# Patient Record
Sex: FEMALE | Race: BLACK OR AFRICAN AMERICAN | NOT HISPANIC OR LATINO | Employment: FULL TIME | URBAN - METROPOLITAN AREA
[De-identification: names, ages, dates, MRNs, and addresses within clinical notes are randomized per-mention and may not be internally consistent; named-entity substitution may affect disease eponyms.]

---

## 2017-11-15 ENCOUNTER — TRANSCRIBE ORDERS (OUTPATIENT)
Dept: LAB | Facility: CLINIC | Age: 42
End: 2017-11-15

## 2017-11-15 ENCOUNTER — APPOINTMENT (OUTPATIENT)
Dept: LAB | Facility: CLINIC | Age: 42
End: 2017-11-15

## 2017-11-15 DIAGNOSIS — Z02.1 PRE-EMPLOYMENT HEALTH SCREENING EXAMINATION: ICD-10-CM

## 2017-11-15 DIAGNOSIS — Z02.1 PRE-EMPLOYMENT HEALTH SCREENING EXAMINATION: Primary | ICD-10-CM

## 2017-11-15 LAB — RUBV IGG SERPL IA-ACNC: 25.2 IU/ML

## 2017-11-15 PROCEDURE — 86706 HEP B SURFACE ANTIBODY: CPT

## 2017-11-15 PROCEDURE — 86480 TB TEST CELL IMMUN MEASURE: CPT

## 2017-11-15 PROCEDURE — 86762 RUBELLA ANTIBODY: CPT

## 2017-11-15 PROCEDURE — 36415 COLL VENOUS BLD VENIPUNCTURE: CPT

## 2017-11-15 PROCEDURE — 86765 RUBEOLA ANTIBODY: CPT

## 2017-11-15 PROCEDURE — 86787 VARICELLA-ZOSTER ANTIBODY: CPT

## 2017-11-15 PROCEDURE — 86735 MUMPS ANTIBODY: CPT

## 2017-11-16 LAB
HBV SURFACE AB SER-ACNC: <3.1 MIU/ML
MEV IGG SER QL: ABNORMAL
MUV IGG SER QL: NORMAL
VZV IGG SER IA-ACNC: NORMAL

## 2017-11-20 LAB — QUANTIFERON-TB GOLD IN TUBE: NORMAL

## 2018-03-14 ENCOUNTER — HOSPITAL ENCOUNTER (OUTPATIENT)
Dept: MAMMOGRAPHY | Facility: CLINIC | Age: 43
Discharge: HOME | End: 2018-03-14
Payer: COMMERCIAL

## 2018-03-14 ENCOUNTER — OFFICE VISIT (OUTPATIENT)
Dept: OBGYN CLINIC | Facility: CLINIC | Age: 43
End: 2018-03-14
Payer: COMMERCIAL

## 2018-03-14 ENCOUNTER — APPOINTMENT (OUTPATIENT)
Dept: LAB | Facility: CLINIC | Age: 43
End: 2018-03-14
Payer: COMMERCIAL

## 2018-03-14 VITALS
WEIGHT: 182 LBS | HEIGHT: 66 IN | SYSTOLIC BLOOD PRESSURE: 124 MMHG | BODY MASS INDEX: 29.25 KG/M2 | DIASTOLIC BLOOD PRESSURE: 70 MMHG

## 2018-03-14 DIAGNOSIS — Z01.419 ENCOUNTER FOR GYNECOLOGICAL EXAMINATION WITHOUT ABNORMAL FINDING: Primary | ICD-10-CM

## 2018-03-14 DIAGNOSIS — Z11.3 SCREENING FOR STDS (SEXUALLY TRANSMITTED DISEASES): ICD-10-CM

## 2018-03-14 DIAGNOSIS — B37.3 CANDIDIASIS OF FEMALE GENITALIA: ICD-10-CM

## 2018-03-14 DIAGNOSIS — Z12.31 ENCOUNTER FOR SCREENING MAMMOGRAM FOR MALIGNANT NEOPLASM OF BREAST: ICD-10-CM

## 2018-03-14 DIAGNOSIS — Z11.51 SCREENING FOR HUMAN PAPILLOMAVIRUS (HPV): ICD-10-CM

## 2018-03-14 DIAGNOSIS — Z11.3 SCREENING EXAMINATION FOR STD (SEXUALLY TRANSMITTED DISEASE): ICD-10-CM

## 2018-03-14 PROCEDURE — G0143 SCR C/V CYTO,THINLAYER,RESCR: HCPCS | Performed by: NURSE PRACTITIONER

## 2018-03-14 PROCEDURE — 86803 HEPATITIS C AB TEST: CPT | Performed by: NURSE PRACTITIONER

## 2018-03-14 PROCEDURE — 77063 BREAST TOMOSYNTHESIS BI: CPT

## 2018-03-14 PROCEDURE — 87491 CHLMYD TRACH DNA AMP PROBE: CPT | Performed by: NURSE PRACTITIONER

## 2018-03-14 PROCEDURE — 87389 HIV-1 AG W/HIV-1&-2 AB AG IA: CPT | Performed by: NURSE PRACTITIONER

## 2018-03-14 PROCEDURE — 86592 SYPHILIS TEST NON-TREP QUAL: CPT | Performed by: NURSE PRACTITIONER

## 2018-03-14 PROCEDURE — 77067 SCR MAMMO BI INCL CAD: CPT

## 2018-03-14 PROCEDURE — 99386 PREV VISIT NEW AGE 40-64: CPT | Performed by: NURSE PRACTITIONER

## 2018-03-14 PROCEDURE — 36415 COLL VENOUS BLD VENIPUNCTURE: CPT | Performed by: NURSE PRACTITIONER

## 2018-03-14 PROCEDURE — 87350 HEPATITIS BE AG IA: CPT | Performed by: NURSE PRACTITIONER

## 2018-03-14 PROCEDURE — 87624 HPV HI-RISK TYP POOLED RSLT: CPT | Performed by: NURSE PRACTITIONER

## 2018-03-14 PROCEDURE — 87591 N.GONORRHOEAE DNA AMP PROB: CPT | Performed by: NURSE PRACTITIONER

## 2018-03-14 RX ORDER — FLUCONAZOLE 150 MG/1
150 TABLET ORAL ONCE
Qty: 3 TABLET | Refills: 1 | Status: SHIPPED | OUTPATIENT
Start: 2018-03-14 | End: 2018-03-14

## 2018-03-14 NOTE — PROGRESS NOTES
Assessment/Plan:    No problem-specific Assessment & Plan notes found for this encounter  Diagnoses and all orders for this visit:    Encounter for gynecological examination without abnormal finding    Encounter for screening mammogram for malignant neoplasm of breast  -     Mammo screening bilateral w cad; Future    Screening for human papillomavirus (HPV)  -     Liquid-based pap, screening    Screening examination for STD (sexually transmitted disease)  -     Hepatitis B e antigen  -     Hepatitis C antibody  -     HIV 1/2 AG-AB combo  -     RPR    Screening for STDs (sexually transmitted diseases)  -     Chlamydia/GC amplified DNA by PCR    Candidiasis of female genitalia  -     fluconazole (DIFLUCAN) 150 mg tablet; Take 1 tablet (150 mg total) by mouth once for 1 dose Repeat on day 3 and day 6 for total of 3 doses        Calcium/vit d supplementation, natural lubes list given, call with any bleeding or spotting  Subjective:      Patient ID: Avel Caballero is a 40 y o  female  Pleasant 40 y o  premenopausal female here for annual exam  She denies any issues with vaginal bleeding, hx of supracervical? Hysterectomy for fibroids 2016  Denies history of abnormal pap smears  +vaginal itching since using amoxicillin recently for URI  Denies pelvic pain but has pain with sex possibly d/t dryness, has not used lubes  Gynecologic Exam   Pertinent negatives include no chills, constipation, diarrhea, dysuria, fever or hematuria  The following portions of the patient's history were reviewed and updated as appropriate:   She  has no past medical history on file  She   Patient Active Problem List    Diagnosis Date Noted    Encounter for gynecological examination without abnormal finding 03/14/2018    Hereditary spherocytosis ([facility] [address] ) 04/07/2009     She  has no past surgical history on file  Her family history is not on file  She  reports that she has never smoked   She has never used smokeless tobacco  She reports that she drinks alcohol  She reports that she does not use drugs  Current Outpatient Prescriptions   Medication Sig Dispense Refill    fluconazole (DIFLUCAN) 150 mg tablet Take 1 tablet (150 mg total) by mouth once for 1 dose Repeat on day 3 and day 6 for total of 3 doses 3 tablet 1     No current facility-administered medications for this visit  She is allergic to latex and shellfish allergy     , son is 13 and daughter is 7  Works for [employer],  next door    Review of Systems   Constitutional: Negative for chills, fatigue, fever and unexpected weight change  Respiratory: Negative for shortness of breath  Gastrointestinal: Negative for anal bleeding, blood in stool, constipation and diarrhea  Genitourinary: Negative for difficulty urinating, dysuria and hematuria  Objective:      /70 (BP Location: Right arm, Patient Position: Sitting, Cuff Size: Standard)   Ht 5' 6" (1 676 m)   Wt 82 6 kg (182 lb)   BMI 29 38 kg/m²          Physical Exam   Constitutional: She appears well-developed and well-nourished  No distress  HENT:   Head: Normocephalic  Neck: Normal range of motion  Neck supple  Pulmonary/Chest: Effort normal  Right breast exhibits no inverted nipple, no mass, no nipple discharge, no skin change and no tenderness  Left breast exhibits no inverted nipple, no mass, no nipple discharge, no skin change and no tenderness  Breasts are symmetrical    Abdominal: Soft  Genitourinary: No breast swelling, tenderness, discharge or bleeding  Pelvic exam was performed with patient supine  No labial fusion  There is no rash, tenderness, lesion or injury on the right labia  There is no rash, tenderness, lesion or injury on the left labia  Right adnexum displays no mass, no tenderness and no fullness  Left adnexum displays no mass, no tenderness and no fullness  No erythema or tenderness in the vagina  No foreign body in the vagina   No signs of injury around the vagina  No vaginal discharge found  Genitourinary Comments: Cervix not seen, uterus absent, yeast NOTED on exam   Lymphadenopathy:        Right: No inguinal adenopathy present  Left: No inguinal adenopathy present

## 2018-03-14 NOTE — PATIENT INSTRUCTIONS
Menopause   WHAT YOU NEED TO KNOW:   What is menopause? Menopause is a normal stage in a woman's life when her monthly periods stop  Menopause starts when the ovaries slowly stop making the female hormones estrogen and progesterone  After menopause, a woman is no longer able to become pregnant  A woman who has not had a period for a full year after the age of 39 is considered to be in menopause  Perimenopause is a stage before menopause that may cause signs and symptoms similar to menopause  Perimenopause can last an average of 4 to 5 years  What are the signs and symptoms of menopause? The signs and symptoms of menopause can be different from woman to woman:  · Menstrual period changes such as skipped periods or periods that are closer together, or lighter or heavier than usual    · Hot flashes (feeling warm, flushed, and sweaty)     · Mood changes such as irritability or decreased desire to have sex    · Breast changes such as tenderness or pain    · Hair changes such as thinning hair or increased hair on your face    · Vaginal changes such as increased dryness     · Urinary changes such as increased urinary tract infections (UTIs) or urgency (feeling that you need to urinate right away)    · Other symptoms such as headaches, trouble sleeping, fatigue, or heart palpitations (strong, fast heartbeats)  What do I need to know about menopause? · You can still get pregnant while you have periods  Continue to use birth control if you do not want to get pregnant  You may need to use birth control until it has been 1 year since your periods stopped  Ask your healthcare provider when you can stop using birth control to prevent pregnancy  · Hormone replacement therapy can be used to treat symptoms of menopause  Hormone replacement therapy (HRT) is medicine that replaces your low hormone levels  HRT contains estrogen and sometimes progestin  HRT has benefits and risks   HRT decreases your risk for bone fractures by helping to prevent osteoporosis  HRT also protects you from colon cancer  HRT may increase your risk for breast cancer, blood clots, heart disease, and stroke  Ask your healthcare provider if HRT is right for you  How can I live a healthy lifestyle during and after menopause? After menopause, your risk for heart disease and bone loss increases  Ask about these and other ways to stay healthy:  · Exercise regularly  Exercise helps you maintain a healthy weight  Exercise can also help to control your blood pressure and cholesterol levels  Include weight-bearing exercise for strong bones  Ask your healthcare provider about the best exercise plan for you  · Eat a variety of healthy foods  Include fruits, vegetables, whole grains (whole-wheat bread, pasta, and cereals), low-fat dairy, and lean protein foods (beans, poultry, and fish)  Limit foods high in sodium (salt)  Ask your healthcare provider for more information about a meal plan that is right for you  · Maintain a healthy weight  Check with your healthcare provider before you start any weight loss program      · Take supplements as directed  You may need extra calcium and vitamin D to help prevent osteoporosis  · Limit alcohol and caffeine  Alcohol and caffeine may worsen your symptoms  · Do not smoke  If you smoke, it is never too late to quit  You are more likely to have a heart attack, lung disease, blood clots, and cancer if you smoke  Ask your healthcare provider for information if you need help quitting  When should I contact my healthcare provider? · You have vaginal bleeding after menopause  · You have questions or concerns about your condition or care  CARE AGREEMENT:   You have the right to help plan your care  Learn about your health condition and how it may be treated  Discuss treatment options with your caregivers to decide what care you want to receive  You always have the right to refuse treatment   The above information is an  only  It is not intended as medical advice for individual conditions or treatments  Talk to your doctor, nurse or pharmacist before following any medical regimen to see if it is safe and effective for you  © 2017 [facility] [place] [facility]– [place] Information is for End User's use only and may not be sold, redistributed or otherwise used for commercial purposes  All illustrations and images included in CareNotes® are the copyrighted property of A D A M , Inc  or Melvin Tomlinson

## 2018-03-15 LAB
CHLAMYDIA DNA CVX QL NAA+PROBE: NORMAL
HCV AB SER QL: NORMAL
N GONORRHOEA DNA GENITAL QL NAA+PROBE: NORMAL
RPR SER QL: NORMAL

## 2018-03-16 LAB
HBV E AG SERPL QL IA: NEGATIVE
HIV 1+2 AB+HIV1 P24 AG SERPL QL IA: NORMAL

## 2018-03-19 LAB — HPV RRNA GENITAL QL NAA+PROBE: NORMAL

## 2018-03-20 LAB
LAB AP GYN PRIMARY INTERPRETATION: NORMAL
Lab: NORMAL

## 2018-03-23 ENCOUNTER — OFFICE VISIT (OUTPATIENT)
Dept: DERMATOLOGY | Facility: CLINIC | Age: 43
End: 2018-03-23
Payer: COMMERCIAL

## 2018-03-23 DIAGNOSIS — L70.0 ACNE VULGARIS: Primary | ICD-10-CM

## 2018-03-23 PROCEDURE — 99203 OFFICE O/P NEW LOW 30 MIN: CPT | Performed by: DERMATOLOGY

## 2018-03-23 RX ORDER — SPIRONOLACTONE 50 MG/1
50 TABLET, FILM COATED ORAL DAILY
Qty: 30 TABLET | Refills: 2 | Status: SHIPPED | OUTPATIENT
Start: 2018-03-23 | End: 2018-06-20 | Stop reason: SDUPTHER

## 2018-03-23 RX ORDER — DIPHENOXYLATE HYDROCHLORIDE AND ATROPINE SULFATE 2.5; .025 MG/1; MG/1
1 TABLET ORAL DAILY
COMMUNITY

## 2018-03-23 NOTE — PATIENT INSTRUCTIONS
Acne we discussed the pathophysiology of acne the role of hormones cleansers diet and genetics  Will go ahead and start patient on topical therapy and re-evaluate in 3 months   will also will add spironolactone to her therapy    Patient advised to obtain Differin gel 0 1% over-the-counter along with  2 5% benzoyl peroxide to use daily

## 2018-03-23 NOTE — PROGRESS NOTES
[address] [facility] [address] [facility]  [address] [address]     MRN: [id number] : 1975  Encounter: [id number]  Patient Information: Mode Nicole  Chief complaint: facial acne    History of present illness:  43-year-old female presents because of concerns regarding acne this been a problem over the last couple years especially since she had a hysterectomy with oophorectomy  Patient has used numerous cleanses over the years without any improvement  Past Medical History:   Diagnosis Date    Fibroid      Past Surgical History:   Procedure Laterality Date    HYSTERECTOMY N/A      Social History   History   Alcohol Use    Yes     Comment: social     History   Drug Use No     History   Smoking Status    Never Smoker   Smokeless Tobacco    Never Used     No family history on file  Meds/Allergies   Allergies   Allergen Reactions    Latex      contact    Shellfish Allergy        Meds:  Prior to Admission medications    Medication Sig Start Date End Date Taking?  Authorizing Provider   multivitamin (THERAGRAN) TABS Take 1 tablet by mouth daily   Yes Historical Provider, MD       Subjective:     Review of Systems:    General: negative for - chills, fatigue, fever,  weight gain or weight loss  Psychological: negative for - anxiety, behavioral disorder, concentration difficulties, decreased libido, depression, irritability, memory difficulties, mood swings, sleep disturbances or suicidal ideation  ENT: negative for - hearing difficulties , nasal congestion, nasal discharge, oral lesions, sinus pain, sneezing, sore throat  Allergy and Immunology: negative for - hives, insect bite sensitivity,  Hematological and Lymphatic: negative for - bleeding problems, blood clots,bruising, swollen lymph nodes  Endocrine: negative for - hair pattern changes, hot flashes, malaise/lethargy, mood swings, palpitations, polydipsia/polyuria, skin changes, temperature intolerance or unexpected weight change  Respiratory: negative for - cough, hemoptysis, orthopnea, shortness of breath, or wheezing  Cardiovascular: negative for - chest pain, dyspnea on exertion, edema,  Gastrointestinal: negative for - abdominal pain, nausea/vomiting  Genito-Urinary: negative for - dysuria, incontinence, irregular/heavy menses or urinary frequency/urgency  Musculoskeletal: negative for - gait disturbance, joint pain, joint stiffness, joint swelling, muscle pain, muscular weakness  Dermatological:  As in HPI  Neurological: negative for confusion, dizziness, headaches, impaired coordination/balance, memory loss, numbness/tingling, seizures, speech problems, tremors or weakness       Objective: There were no vitals taken for this visit  Physical Exam:    General Appearance:    Alert, cooperative, no distress   Head:    Normocephalic, without obvious abnormality, atraumatic           Skin:   A full skin exam was performed including scalp, head scalp, eyes, ears, nose, lips, neck, chest, axilla, abdomen, back, buttocks, bilateral upper extremities, bilateral lower extremities, hands, feet, fingers, toes, fingernails, and toenails Occasional inflamed papule nodules noted on the forehead some comedones no scarring noted     Assessment:     1  Acne vulgaris  spironolactone (ALDACTONE) 50 mg tablet         Plan:   Acne we discussed the pathophysiology of acne the role of hormones cleansers diet and genetics  Will go ahead and start patient on topical therapy and re-evaluate in 3 months   will also will add spironolactone to her therapy    Patient advised to obtain Differin gel 0 1% over-the-counter along with  2 5% benzoyl peroxide to use daily    Rahul Brady MD  3/23/2018,12:37 PM    Portions of the record may have been created with voice recognition software   Occasional wrong word or "sound a like" substitutions may have occurred due to the inherent limitations of voice recognition software   Read the chart carefully and recognize, using context, where substitutions have occurred

## 2018-04-14 ENCOUNTER — OFFICE VISIT (OUTPATIENT)
Dept: URGENT CARE | Facility: CLINIC | Age: 43
End: 2018-04-14
Payer: COMMERCIAL

## 2018-04-14 VITALS
BODY MASS INDEX: 29.86 KG/M2 | OXYGEN SATURATION: 98 % | RESPIRATION RATE: 16 BRPM | DIASTOLIC BLOOD PRESSURE: 71 MMHG | HEART RATE: 100 BPM | HEIGHT: 66 IN | SYSTOLIC BLOOD PRESSURE: 108 MMHG | WEIGHT: 185.8 LBS | TEMPERATURE: 97.4 F

## 2018-04-14 DIAGNOSIS — J02.9 ACUTE SORE THROAT: ICD-10-CM

## 2018-04-14 DIAGNOSIS — R09.89 TONSIL PAIN: Primary | ICD-10-CM

## 2018-04-14 DIAGNOSIS — S16.1XXA STRAIN OF MUSCLE, FASCIA AND TENDON AT NECK LEVEL, INITIAL ENCOUNTER: ICD-10-CM

## 2018-04-14 LAB — S PYO AG THROAT QL: NEGATIVE

## 2018-04-14 PROCEDURE — S9088 SERVICES PROVIDED IN URGENT: HCPCS | Performed by: PHYSICIAN ASSISTANT

## 2018-04-14 PROCEDURE — 99213 OFFICE O/P EST LOW 20 MIN: CPT | Performed by: PHYSICIAN ASSISTANT

## 2018-04-14 PROCEDURE — 87070 CULTURE OTHR SPECIMN AEROBIC: CPT | Performed by: PHYSICIAN ASSISTANT

## 2018-04-14 RX ORDER — AZITHROMYCIN 250 MG/1
TABLET, FILM COATED ORAL
Qty: 6 TABLET | Refills: 0 | Status: SHIPPED | OUTPATIENT
Start: 2018-04-14 | End: 2018-04-18

## 2018-04-14 RX ORDER — IBUPROFEN 800 MG/1
800 TABLET ORAL EVERY 6 HOURS PRN
Qty: 30 TABLET | Refills: 0 | Status: SHIPPED | OUTPATIENT
Start: 2018-04-14 | End: 2018-11-20 | Stop reason: CLARIF

## 2018-04-14 RX ORDER — AMOXICILLIN 500 MG/1
500 TABLET, FILM COATED ORAL 2 TIMES DAILY
Qty: 20 TABLET | Refills: 0 | Status: SHIPPED | OUTPATIENT
Start: 2018-04-14 | End: 2018-04-14 | Stop reason: SINTOL

## 2018-04-14 RX ORDER — METAXALONE 800 MG/1
800 TABLET ORAL EVERY 6 HOURS PRN
Qty: 12 TABLET | Refills: 0 | Status: SHIPPED | OUTPATIENT
Start: 2018-04-14 | End: 2018-11-20 | Stop reason: SDUPTHER

## 2018-04-14 NOTE — LETTER
April 14, 2018     Patient: Mckenna Márquez   YOB: 1975   Date of Visit: 4/14/2018       To Whom it May Concern:    Mckenna Márquez is under my professional care  She was seen in my office on 4/14/2018  She  may return to work when she is fever and symptom free  If you have any questions or concerns, please don't hesitate to call           Sincerely,          BAYLEE Kelley        CC: No Recipients

## 2018-04-14 NOTE — PROGRESS NOTES
[facility]        NAME: Mya Williamson is a 40 y o  female  : 1975    MRN: [id number]  DATE: 2018  TIME: 11:33 AM    Assessment and Plan   Tonsil pain [R09 89]  1  Tonsil pain  POCT rapid strepA    Throat culture    azithromycin (ZITHROMAX) 250 mg tablet    DISCONTINUED: amoxicillin (AMOXIL) 500 MG tablet   2  Strain of muscle, fascia and tendon at neck level, initial encounter  ibuprofen (MOTRIN) 800 mg tablet    metaxalone (SKELAXIN) 800 mg tablet   3  Acute sore throat           Patient Instructions   Rapid strep test performed in office-negative  Throat culture sent to lab  Patient presents with sore throat and left-sided neck and shoulder pain    Will treat for possible left tonsillar abscess with azithromycin (patient states that she has not tolerated amoxicillin in the past) as well as musculoskeletal pain and spasm with ibuprofen 800 mg and Skelaxin  Heat to affected area, rest   Closely monitor for fever or chills  Neck exercises at home  I extensively educated the patient on the possibility of her developing a left tonsillar abscess  We will treat with prophylactic antibiotics  If she develops difficulty swallowing, difficulty swallowing secretions or voice change, fever or chills she should immediately proceed to the emergency room  Follow up with PCP in 3-5 days  Proceed to  ER if symptoms worsen  Chief Complaint     Chief Complaint   Patient presents with    Sore Throat     Started Thursday    Neck Pain     L sided, radiates down into L shoulder         History of Present Illness   The patient is a 62-year-old female who presents with sore throat and left neck and shoulder pain for 2 days  Positive sore throat, denies difficulty swallowing or drooling or voice change  Negative dental pain or recent oral surgery  She states that her sore throat is worse on the left side    She also feels as though the left side of her neck is painful positive left neck pain radiating to her shoulder  She denies any pain radiating down to her fingers  Negative numbness or tingling  Negative fever or chills  Negative abdominal pain, nausea, vomiting or diarrhea  She does work at [employer] and does a lot of computer work where she is staying down  She denies any neck trauma or injury, no prior history of neck or shoulder injury  HPI    Review of Systems   Review of Systems   Constitutional: Negative for activity change, appetite change, chills, fatigue and fever  HENT: Positive for sore throat  Negative for congestion, dental problem, drooling, ear discharge, ear pain, facial swelling, hearing loss, postnasal drip, rhinorrhea, sinus pain, sinus pressure, sneezing, tinnitus, trouble swallowing and voice change  Eyes: Negative  Respiratory: Negative  Negative for cough and shortness of breath  Cardiovascular: Negative  Negative for chest pain, palpitations and leg swelling  Gastrointestinal: Negative  Negative for abdominal distention, abdominal pain, diarrhea, nausea and vomiting  Genitourinary: Negative  Negative for difficulty urinating  Musculoskeletal: Positive for neck pain  Negative for gait problem, joint swelling and neck stiffness  Skin: Negative  Negative for rash and wound  Neurological: Negative for dizziness, syncope, speech difficulty, weakness, light-headedness, numbness and headaches  All other systems reviewed and are negative          Current Medications       Current Outpatient Prescriptions:     multivitamin (THERAGRAN) TABS, Take 1 tablet by mouth daily, Disp: , Rfl:     spironolactone (ALDACTONE) 50 mg tablet, Take 1 tablet (50 mg total) by mouth daily, Disp: 30 tablet, Rfl: 2    azithromycin (ZITHROMAX) 250 mg tablet, Take 2 tablets on day 1 and then 1 tablet daily x 4 days, Disp: 6 tablet, Rfl: 0    ibuprofen (MOTRIN) 800 mg tablet, Take 1 tablet (800 mg total) by mouth every 6 (six) hours as needed for mild pain, Disp: 30 tablet, Rfl: 0    metaxalone (SKELAXIN) 800 mg tablet, Take 1 tablet (800 mg total) by mouth every 6 (six) hours as needed for muscle spasms, Disp: 12 tablet, Rfl: 0    Current Allergies     Allergies as of 04/14/2018 - Reviewed 04/14/2018   Allergen Reaction Noted    Latex  11/17/2016    Shellfish allergy  04/07/2009            The following portions of the patient's history were reviewed and updated as appropriate: allergies, current medications, past family history, past medical history, past social history, past surgical history and problem list      Past Medical History:   Diagnosis Date    Fibroid        Past Surgical History:   Procedure Laterality Date    HYSTERECTOMY N/A        History reviewed  No pertinent family history  Medications have been verified  Objective   /71   Pulse 100   Temp (!) 97 4 °F (36 3 °C) (Tympanic)   Resp 16   Ht 5' 6" (1 676 m)   Wt 84 3 kg (185 lb 12 8 oz)   SpO2 98%   BMI 29 99 kg/m²        Physical Exam     Physical Exam   Constitutional: She is oriented to person, place, and time  She appears well-developed and well-nourished  No distress  HENT:   Head: Normocephalic and atraumatic  Right Ear: Hearing, tympanic membrane, external ear and ear canal normal  No drainage or tenderness  Tympanic membrane is not perforated, not erythematous, not retracted and not bulging  No middle ear effusion  No decreased hearing is noted  Left Ear: Hearing, tympanic membrane, external ear and ear canal normal  No drainage or tenderness  Tympanic membrane is not perforated, not erythematous, not retracted and not bulging  No middle ear effusion  No decreased hearing is noted  Nose: Nose normal  No mucosal edema, rhinorrhea or septal deviation  Right sinus exhibits no maxillary sinus tenderness and no frontal sinus tenderness  Left sinus exhibits no maxillary sinus tenderness and no frontal sinus tenderness     Mouth/Throat: Uvula is midline, oropharynx is clear and moist and mucous membranes are normal  No oral lesions  Normal dentition  No dental abscesses, uvula swelling or dental caries  No oropharyngeal exudate, posterior oropharyngeal edema, posterior oropharyngeal erythema or tonsillar abscesses  Eyes: Conjunctivae and EOM are normal  Pupils are equal, round, and reactive to light  Neck: Trachea normal and phonation normal  Neck supple  No JVD present  Muscular tenderness present  No tracheal tenderness and no spinous process tenderness present  No neck rigidity  No tracheal deviation, no edema, no erythema and normal range of motion present  No Brudzinski's sign and no Kernig's sign noted  No thyroid mass and no thyromegaly present  Cardiovascular: Normal rate, regular rhythm, S1 normal, S2 normal, normal heart sounds, intact distal pulses and normal pulses  No murmur heard  Pulmonary/Chest: Effort normal and breath sounds normal  No accessory muscle usage or stridor  No respiratory distress  She has no wheezes  Abdominal: Soft  Normal appearance and bowel sounds are normal  She exhibits no distension  There is no hepatosplenomegaly  There is no tenderness  Musculoskeletal: She exhibits no edema  Left shoulder: She exhibits decreased range of motion, tenderness, pain and spasm  She exhibits no bony tenderness, no swelling, no effusion, no crepitus, no deformity, no laceration, normal pulse and normal strength  Arms:       Left hand: She exhibits normal capillary refill  Normal sensation noted  Decreased sensation is not present in the ulnar distribution, is not present in the medial redistribution and is not present in the radial distribution  Normal strength noted  She exhibits no finger abduction, no thumb/finger opposition and no wrist extension trouble  Negative radiating pain to left fingers, negative paresthesias     Lymphadenopathy:        Head (right side): No submental, no submandibular, no tonsillar, no preauricular, no posterior auricular and no occipital adenopathy present  Head (left side): No submental, no submandibular, no tonsillar, no preauricular, no posterior auricular and no occipital adenopathy present  Right cervical: No superficial cervical, no deep cervical and no posterior cervical adenopathy present  Left cervical: No superficial cervical, no deep cervical and no posterior cervical adenopathy present  Neurological: She is alert and oriented to person, place, and time  She has normal strength  No cranial nerve deficit or sensory deficit  Coordination and gait normal    Skin: Skin is warm, dry and intact  No abrasion, no lesion and no rash noted  She is not diaphoretic  No cyanosis or erythema  Nails show no clubbing  Psychiatric: She has a normal mood and affect  Her speech is normal and behavior is normal    Nursing note and vitals reviewed

## 2018-04-14 NOTE — PATIENT INSTRUCTIONS
Rapid strep test performed in office-  Patient presents with sore throat and left-sided neck and shoulder pain    Will treat for possible left tonsillar abscess with amoxicillin as well as musculoskeletal pain and spasm with ibuprofen 800 mg and Skelaxin  Heat to affected area, rest   Closely monitor for fever or chills  Neck exercises at home  Follow up with PCP in 3-5 days  Proceed to  ER if symptoms worsen  Acute Neck Pain   AMBULATORY CARE:   Acute neck pain  starts suddenly, increases quickly, and goes away in a few days  The pain may come and go, or be worse with certain movements  The pain may be only in your neck, or it may move to your arms, back, or shoulders  You may also have pain that starts in another body area and moves to your neck  Seek care immediately if:   · You have an injury that causes neck pain and shooting pain down your arms or legs  · Your neck pain suddenly becomes severe  · You have neck pain along with numbness, tingling, or weakness in your arms or legs  · You have a stiff neck, a headache, and a fever  Contact your healthcare provider if:   · You have new or worsening symptoms  · Your symptoms continue even after treatment  · You have questions or concerns about your condition or care  Treatment  may include any of the following, depending on what is causing your pain:  · Medicines  may be prescribed or recommended by your healthcare provider for pain  You may need medicine to treat nerve pain or to stop muscle spasms  Medicines may also be given to reduce inflammation  Your healthcare provider may inject medicine into a nerve to block pain  Over-the-counter NSAID medicine or acetaminophen may be recommended to help treat minor pain or inflammation  · Traction  is used to relieve pressure from nerves  Your head is gently pulled up and away from your neck  This stretches muscles and ligaments and makes more room for the spine   Your healthcare provider will tell you the kind of traction that will help your neck pain  Do not use traction devices at home unless directed by your healthcare provider  Manage or prevent acute neck pain:   · Rest your neck as directed  Do not make sudden movements, such as turning your head quickly  Your healthcare provider may recommend you wear a cervical collar for a short time  The collar will prevent you from moving your head  This will give your neck time to heal if an injury is causing your neck pain  Ask your healthcare provider when you can return to sports or other normal daily activities  · Apply heat as directed  Heat helps relieve pain and swelling  Use a heat wrap, or soak a small towel in warm water  Wring out the extra water  Apply the heat wrap or towel for 20 minutes every hour, or as directed  · Apply ice as directed  Ice helps relieve pain and swelling, and can help prevent tissue damage  Use an ice pack, or put ice in a bag  Cover the ice pack or back with a towel before you apply it to your neck  Apply the ice pack or ice for 15 minutes every hour, or as directed  Your healthcare provider can tell you how often to apply ice  · Do neck exercises as directed  Neck exercises help strengthen the muscles and increase range of motion  Your healthcare provider will tell you which exercises are right for you  He may give you instructions, or he may recommend that you work with a physical therapist  Your healthcare provider or therapist can make sure you are doing the exercises correctly  · Maintain good posture  Try to keep your head and shoulders lifted when you sit  If you work in front of a computer, make sure the monitor is at the right level  You should not need to look up down to see the screen  You should also not have to lean forward to be able to read what is on the screen   Make sure your keyboard, mouse, and other computer items are placed where you do not have to extend your shoulder to reach them  Get up often if you work in front of a computer or sit for long periods of time  Stretch or walk around to keep your neck muscles loose  Follow up with your healthcare provider as directed: Your healthcare provider may refer you to a specialist if your pain does not get better with treatment  Write down your questions so you remember to ask them during your visits  © 2017 [employer] Information is for End User's use only and may not be sold, redistributed or otherwise used for commercial purposes  All illustrations and images included in CareNotes® are the copyrighted property of [employer]  or [employer]  The above information is an  only  It is not intended as medical advice for individual conditions or treatments  Talk to your doctor, nurse or pharmacist before following any medical regimen to see if it is safe and effective for you  Peritonsillar Abscess   AMBULATORY CARE:   A peritonsillar abscess  or PTA, is a collection of pus in the peritonsillar space  The peritonsillar space is the area between your tonsil and the back wall of your throat  It is near the opening of the tubes leading to your stomach and lungs  Common symptoms and symptoms include the following:   · Sore throat, often severe    · Drooling or bad breath    · Voice change    · Fever    · Loss of appetite    · Red and swollen tonsil or throat    · Ear pain    · Pain or difficulty when you open or close your mouth, swallow, and move your neck  Call 911 for any of the following:   · You have trouble breathing  Seek care immediately if:   · You have more pain, swelling, or redness in your throat  · Your symptoms get worse or do not get better, even with treatment  · You have difficulty or pain when you swallow, or you cannot eat or drink  Contact your healthcare provider if:   · Your abscess returns      · You have questions or concerns about your condition or care   Treatment for a peritonsillar abscess  may depend on how severe it is  Surgery or an incision and drainage may be needed if other treatments do not work  Medicines:   · Antibiotics  help treat or prevent a bacterial infection  · Acetaminophen  decreases pain and fever  It is available without a doctor's order  Ask how much to take and how often to take it  Follow directions  Acetaminophen can cause liver damage if not taken correctly  · Steroids  decrease swelling  · NSAIDs , such as ibuprofen, help decrease swelling, pain, and fever  This medicine is available with or without a doctor's order  NSAIDs can cause stomach bleeding or kidney problems in certain people  If you take blood thinner medicine, always ask if NSAIDs are safe for you  Always read the medicine label and follow directions  Do not give these medicines to children under 10months of age without direction from your child's healthcare provider  · Take your medicine as directed  Contact your healthcare provider if you think your medicine is not helping or if you have side effects  Tell him or her if you are allergic to any medicine  Keep a list of the medicines, vitamins, and herbs you take  Include the amounts, and when and why you take them  Bring the list or the pill bottles to follow-up visits  Carry your medicine list with you in case of an emergency  Decrease your risk for a peritonsillar abscess:   · Care for your mouth and teeth  Brush and floss your teeth after you eat, and before you go to sleep  Gently brush your teeth and gums using a brush with soft bristles  Use a mouth rinse after you brush  See your dentist for regular check-ups  · Do not delay treatment for a sore throat  Make an appointment to see your doctor if you have a sore throat that continues for more than a few days  If you have a fever with a sore throat, call your doctor that day  Early treatment may prevent a peritonsillar abscess   Take your antibiotic for throat infections until it is done  · Do not smoke  Nicotine and other chemicals in cigarettes and cigars may increase your risk for a peritonsillar abscess  Ask your healthcare provider for information if you currently smoke and need help to quit  E-cigarettes or smokeless tobacco still contain nicotine  Talk to your healthcare provider before you use these products  Manage your symptoms:   · A liquid diet  may decrease your discomfort until the PTA is healed  A liquid diet may include jello, juices, or ice pops  Follow up with your healthcare provider as directed:  Write down your questions so you remember to ask them during your visits  © 2017 [employer]  Information is for End User's use only and may not be sold, redistributed or otherwise used for commercial purposes  All illustrations and images included in CareNotes® are the copyrighted property of [employer]  or [employer]  The above information is an  only  It is not intended as medical advice for individual conditions or treatments  Talk to your doctor, nurse or pharmacist before following any medical regimen to see if it is safe and effective for you

## 2018-04-16 LAB — BACTERIA THROAT CULT: NORMAL

## 2018-04-25 ENCOUNTER — OFFICE VISIT (OUTPATIENT)
Dept: DERMATOLOGY | Facility: CLINIC | Age: 43
End: 2018-04-25
Payer: COMMERCIAL

## 2018-04-25 DIAGNOSIS — T20.10XA SUPERFICIAL BURN OF FACE, INITIAL ENCOUNTER: Primary | ICD-10-CM

## 2018-04-25 PROCEDURE — 99202 OFFICE O/P NEW SF 15 MIN: CPT | Performed by: DERMATOLOGY

## 2018-04-25 NOTE — PROGRESS NOTES
[address] [facility]  [address] [address]     MRN: [id number] : 1975  Encounter: [id number]  Patient Information: Anju Patient  Chief complaint:Burn on right face    History of present illness:  71-year-old employee here presents secondary to concerns regarding a burn face that occurred after cooking at home last evening patient notes areas still  uncomfortable  Past Medical History:   Diagnosis Date    Fibroid      Past Surgical History:   Procedure Laterality Date    HYSTERECTOMY N/A      Social History   History   Alcohol Use    Yes     Comment: social     History   Drug Use No     History   Smoking Status    Never Smoker   Smokeless Tobacco    Never Used     No family history on file  Meds/Allergies   Allergies   Allergen Reactions    Latex      contact    Shellfish Allergy        Meds:  Prior to Admission medications    Medication Sig Start Date End Date Taking?  Authorizing Provider   ibuprofen (MOTRIN) 800 mg tablet Take 1 tablet (800 mg total) by mouth every 6 (six) hours as needed for mild pain 18  Yes Elke Hamilton PA-C   metaxalone (SKELAXIN) 800 mg tablet Take 1 tablet (800 mg total) by mouth every 6 (six) hours as needed for muscle spasms 18  Yes Elke Hamilton PA-C   multivitamin (THERAGRAN) TABS Take 1 tablet by mouth daily   Yes Historical Provider, MD   spironolactone (ALDACTONE) 50 mg tablet Take 1 tablet (50 mg total) by mouth daily 3/23/18   Felicita Baker MD       Subjective:     Review of Systems:    General: negative for - chills, fatigue, fever,  weight gain or weight loss  Psychological: negative for - anxiety, behavioral disorder, concentration difficulties, decreased libido, depression, irritability, memory difficulties, mood swings, sleep disturbances or suicidal ideation  ENT: negative for - hearing difficulties , nasal congestion, nasal discharge, oral lesions, sinus pain, sneezing, sore throat  Allergy and Immunology: negative for - hives, insect bite sensitivity,  Hematological and Lymphatic: negative for - bleeding problems, blood clots,bruising, swollen lymph nodes  Endocrine: negative for - hair pattern changes, hot flashes, malaise/lethargy, mood swings, palpitations, polydipsia/polyuria, skin changes, temperature intolerance or unexpected weight change  Respiratory: negative for - cough, hemoptysis, orthopnea, shortness of breath, or wheezing  Cardiovascular: negative for - chest pain, dyspnea on exertion, edema,  Gastrointestinal: negative for - abdominal pain, nausea/vomiting  Genito-Urinary: negative for - dysuria, incontinence, irregular/heavy menses or urinary frequency/urgency  Musculoskeletal: negative for - gait disturbance, joint pain, joint stiffness, joint swelling, muscle pain, muscular weakness  Dermatological:  As in HPI  Neurological: negative for confusion, dizziness, headaches, impaired coordination/balance, memory loss, numbness/tingling, seizures, speech problems, tremors or weakness       Objective: There were no vitals taken for this visit  Physical Exam:    General Appearance:    Alert, cooperative, no distress   Head:    Normocephalic, without obvious abnormality, atraumatic           Skin:  mild erythema noted on the right cheek some slight hyperpigmentation noted no blisters     Assessment:     1  Superficial burn of face, initial encounter           Plan:    no significant epidermal skin changes noted patient advise just use cool compresses no need to use any topical agents    Also warned about potential postinflammatory hyperpigmentation follow-up as needed    Liana Gitelman, MD  4/25/2018,12:50 PM    Portions of the record may have been created with voice recognition software   Occasional wrong word or "sound a like" substitutions may have occurred due to the inherent limitations of voice recognition software   Read the chart carefully and recognize, using context, where substitutions have occurred

## 2018-04-25 NOTE — PATIENT INSTRUCTIONS
no significant epidermal skin changes noted patient advise just use cool compresses no need to use any topical agents    Also warned about potential postinflammatory hyperpigmentation follow-up as needed

## 2018-05-22 ENCOUNTER — TRANSCRIBE ORDERS (OUTPATIENT)
Dept: LAB | Facility: CLINIC | Age: 43
End: 2018-05-22

## 2018-05-22 DIAGNOSIS — N64.4 BREAST PAIN: Primary | ICD-10-CM

## 2018-05-23 ENCOUNTER — HOSPITAL ENCOUNTER (OUTPATIENT)
Dept: ULTRASOUND IMAGING | Facility: CLINIC | Age: 43
Discharge: HOME | End: 2018-05-23
Payer: COMMERCIAL

## 2018-05-23 ENCOUNTER — TELEPHONE (OUTPATIENT)
Dept: OBGYN CLINIC | Facility: CLINIC | Age: 43
End: 2018-05-23

## 2018-05-23 DIAGNOSIS — N64.4 BREAST PAIN: ICD-10-CM

## 2018-05-23 PROCEDURE — 76642 ULTRASOUND BREAST LIMITED: CPT

## 2018-05-23 NOTE — TELEPHONE ENCOUNTER
Left voicemail message today @ [phone] per Pt's signed communication consent form in Pt's EHR  Pt informed, via voicemail message, that her recent right breast ultrasound result was normal per BRITTANY Adler's review  Reiterated to Pt , via message, that if she has any questions/concerns, to contact office

## 2018-05-23 NOTE — TELEPHONE ENCOUNTER
----- Message from Jamie Delarosa, [address] sent at 5/23/2018 12:24 PM EDT -----  Pls notify breast u/s was nml

## 2018-05-24 ENCOUNTER — TELEPHONE (OUTPATIENT)
Dept: OBGYN CLINIC | Facility: CLINIC | Age: 43
End: 2018-05-24

## 2018-05-24 NOTE — TELEPHONE ENCOUNTER
Pt called office today, spoke with Pt via phone call  Pt states she is aware of her "normal" result with regards to her recent right breast ultrasound  Pt further states, however, that she continues to have sharp pain radiating from under her right armpit to right breast   Pt states she is unable to lay on her right side due to breast pain  Pt is scheduled for an appointment with Vanita Dotson on 5/25/18 at 8:00 am [facility]), instructed to arrive by 7:45 am   Reiterated to Pt that if she has any questions/concerns, to contact office

## 2018-05-25 DIAGNOSIS — N64.4 BREAST PAIN: Primary | ICD-10-CM

## 2018-05-31 ENCOUNTER — HOSPITAL ENCOUNTER (OUTPATIENT)
Dept: ULTRASOUND IMAGING | Facility: CLINIC | Age: 43
Discharge: HOME | End: 2018-05-31
Payer: COMMERCIAL

## 2018-05-31 DIAGNOSIS — R92.2 BREAST DENSITY: ICD-10-CM

## 2018-05-31 PROCEDURE — 76377 3D RENDER W/INTRP POSTPROCES: CPT

## 2018-05-31 PROCEDURE — 76641 ULTRASOUND BREAST COMPLETE: CPT

## 2018-06-01 ENCOUNTER — TELEPHONE (OUTPATIENT)
Dept: OBGYN CLINIC | Facility: CLINIC | Age: 43
End: 2018-06-01

## 2018-06-01 NOTE — TELEPHONE ENCOUNTER
----- Message from Rosibel Arvizu, [address] sent at 6/1/2018 11:16 AM EDT -----  Please notify patient her breast u/s were normal  Thanks

## 2018-06-01 NOTE — TELEPHONE ENCOUNTER
Left voicemail message today @ [phone] per Pt's signed communication consent form in Pt's EHR  Pt informed, via voicemail message, that her recent breast ultrasound result was normal per BRITTANY Adler's review  Reiterated to Pt, via message, that if she has any questions/concerns, to contact office

## 2018-06-08 ENCOUNTER — TRANSCRIBE ORDERS (OUTPATIENT)
Dept: LAB | Facility: CLINIC | Age: 43
End: 2018-06-08

## 2018-06-08 ENCOUNTER — APPOINTMENT (OUTPATIENT)
Dept: LAB | Facility: CLINIC | Age: 43
End: 2018-06-08

## 2018-06-08 DIAGNOSIS — Z00.8 HEALTH EXAMINATION IN POPULATION SURVEY: Primary | ICD-10-CM

## 2018-06-08 DIAGNOSIS — Z00.8 HEALTH EXAMINATION IN POPULATION SURVEY: ICD-10-CM

## 2018-06-08 LAB
CHOLEST SERPL-MCNC: 177 MG/DL (ref 50–200)
EST. AVERAGE GLUCOSE BLD GHB EST-MCNC: 91 MG/DL
HBA1C MFR BLD: 4.8 % (ref 4.2–6.3)
HDLC SERPL-MCNC: 65 MG/DL (ref 40–60)
LDLC SERPL CALC-MCNC: 91 MG/DL (ref 0–100)
NONHDLC SERPL-MCNC: 112 MG/DL
TRIGL SERPL-MCNC: 104 MG/DL

## 2018-06-08 PROCEDURE — 83036 HEMOGLOBIN GLYCOSYLATED A1C: CPT

## 2018-06-08 PROCEDURE — 80061 LIPID PANEL: CPT

## 2018-06-08 PROCEDURE — 36415 COLL VENOUS BLD VENIPUNCTURE: CPT

## 2018-06-20 DIAGNOSIS — L70.0 ACNE VULGARIS: ICD-10-CM

## 2018-06-20 RX ORDER — SPIRONOLACTONE 50 MG/1
50 TABLET, FILM COATED ORAL DAILY
Qty: 30 TABLET | Refills: 2 | Status: SHIPPED | OUTPATIENT
Start: 2018-06-20 | End: 2018-10-02 | Stop reason: SDUPTHER

## 2018-09-27 ENCOUNTER — TELEPHONE (OUTPATIENT)
Dept: OBGYN CLINIC | Facility: CLINIC | Age: 43
End: 2018-09-27

## 2018-10-02 DIAGNOSIS — L70.0 ACNE VULGARIS: ICD-10-CM

## 2018-10-02 RX ORDER — SPIRONOLACTONE 50 MG/1
50 TABLET, FILM COATED ORAL DAILY
Qty: 30 TABLET | Refills: 2 | Status: SHIPPED | OUTPATIENT
Start: 2018-10-02 | End: 2019-01-30 | Stop reason: SDUPTHER

## 2018-11-20 ENCOUNTER — OFFICE VISIT (OUTPATIENT)
Dept: INTERNAL MEDICINE CLINIC | Facility: CLINIC | Age: 43
End: 2018-11-20
Payer: COMMERCIAL

## 2018-11-20 ENCOUNTER — APPOINTMENT (OUTPATIENT)
Dept: LAB | Facility: CLINIC | Age: 43
End: 2018-11-20
Payer: COMMERCIAL

## 2018-11-20 VITALS
BODY MASS INDEX: 30.63 KG/M2 | HEART RATE: 82 BPM | WEIGHT: 190.6 LBS | SYSTOLIC BLOOD PRESSURE: 112 MMHG | DIASTOLIC BLOOD PRESSURE: 78 MMHG | OXYGEN SATURATION: 98 % | HEIGHT: 66 IN

## 2018-11-20 DIAGNOSIS — T39.395A NSAID INDUCED GASTRITIS: ICD-10-CM

## 2018-11-20 DIAGNOSIS — R53.82 CHRONIC FATIGUE: ICD-10-CM

## 2018-11-20 DIAGNOSIS — Z00.00 ADULT GENERAL MEDICAL EXAMINATION: Primary | ICD-10-CM

## 2018-11-20 DIAGNOSIS — K29.60 NSAID INDUCED GASTRITIS: ICD-10-CM

## 2018-11-20 DIAGNOSIS — E55.9 VITAMIN D DEFICIENCY: ICD-10-CM

## 2018-11-20 DIAGNOSIS — G47.19 DAYTIME HYPERSOMNOLENCE: ICD-10-CM

## 2018-11-20 DIAGNOSIS — M62.838 CERVICAL PARASPINAL MUSCLE SPASM: ICD-10-CM

## 2018-11-20 DIAGNOSIS — D58.0 HEREDITARY SPHEROCYTOSIS (HCC): ICD-10-CM

## 2018-11-20 DIAGNOSIS — Z00.00 ADULT GENERAL MEDICAL EXAMINATION: ICD-10-CM

## 2018-11-20 DIAGNOSIS — Z71.3 DIETARY COUNSELING AND SURVEILLANCE: ICD-10-CM

## 2018-11-20 PROBLEM — T20.10XA FIRST DEGREE BURN OF FACE: Status: RESOLVED | Noted: 2018-04-25 | Resolved: 2018-11-20

## 2018-11-20 PROBLEM — Z01.419 ENCOUNTER FOR GYNECOLOGICAL EXAMINATION WITHOUT ABNORMAL FINDING: Status: RESOLVED | Noted: 2018-03-14 | Resolved: 2018-11-20

## 2018-11-20 LAB
25(OH)D3 SERPL-MCNC: 40.1 NG/ML (ref 30–100)
ALBUMIN SERPL BCP-MCNC: 3.5 G/DL (ref 3.5–5)
ALP SERPL-CCNC: 76 U/L (ref 46–116)
ALT SERPL W P-5'-P-CCNC: 22 U/L (ref 12–78)
ANION GAP SERPL CALCULATED.3IONS-SCNC: 8 MMOL/L (ref 4–13)
AST SERPL W P-5'-P-CCNC: 23 U/L (ref 5–45)
BILIRUB SERPL-MCNC: 0.68 MG/DL (ref 0.2–1)
BILIRUB UR QL STRIP: NEGATIVE
BUN SERPL-MCNC: 11 MG/DL (ref 5–25)
CALCIUM SERPL-MCNC: 8.8 MG/DL (ref 8.3–10.1)
CHLORIDE SERPL-SCNC: 105 MMOL/L (ref 100–108)
CLARITY UR: CLEAR
CO2 SERPL-SCNC: 22 MMOL/L (ref 21–32)
COLOR UR: YELLOW
CREAT SERPL-MCNC: 0.84 MG/DL (ref 0.6–1.3)
ERYTHROCYTE [DISTWIDTH] IN BLOOD BY AUTOMATED COUNT: 12.6 % (ref 11.6–15.1)
GFR SERPL CREATININE-BSD FRML MDRD: 99 ML/MIN/1.73SQ M
GLUCOSE P FAST SERPL-MCNC: 67 MG/DL (ref 65–99)
GLUCOSE UR STRIP-MCNC: NEGATIVE MG/DL
HCT VFR BLD AUTO: 43.7 % (ref 34.8–46.1)
HGB BLD-MCNC: 15.7 G/DL (ref 11.5–15.4)
HGB UR QL STRIP.AUTO: NEGATIVE
KETONES UR STRIP-MCNC: NEGATIVE MG/DL
LEUKOCYTE ESTERASE UR QL STRIP: NEGATIVE
MCH RBC QN AUTO: 33.3 PG (ref 26.8–34.3)
MCHC RBC AUTO-ENTMCNC: 35.9 G/DL (ref 31.4–37.4)
MCV RBC AUTO: 93 FL (ref 82–98)
NITRITE UR QL STRIP: NEGATIVE
PH UR STRIP.AUTO: 6 [PH] (ref 4.5–8)
PLATELET # BLD AUTO: 545 THOUSANDS/UL (ref 149–390)
PMV BLD AUTO: 9.4 FL (ref 8.9–12.7)
POTASSIUM SERPL-SCNC: 4.3 MMOL/L (ref 3.5–5.3)
PROT SERPL-MCNC: 8 G/DL (ref 6.4–8.2)
PROT UR STRIP-MCNC: NEGATIVE MG/DL
RBC # BLD AUTO: 4.72 MILLION/UL (ref 3.81–5.12)
SODIUM SERPL-SCNC: 135 MMOL/L (ref 136–145)
SP GR UR STRIP.AUTO: 1.01 (ref 1–1.03)
TSH SERPL DL<=0.05 MIU/L-ACNC: 1.19 UIU/ML (ref 0.36–3.74)
UROBILINOGEN UR QL STRIP.AUTO: 0.2 E.U./DL
WBC # BLD AUTO: 9.01 THOUSAND/UL (ref 4.31–10.16)

## 2018-11-20 PROCEDURE — 82306 VITAMIN D 25 HYDROXY: CPT

## 2018-11-20 PROCEDURE — 84443 ASSAY THYROID STIM HORMONE: CPT

## 2018-11-20 PROCEDURE — 99386 PREV VISIT NEW AGE 40-64: CPT | Performed by: INTERNAL MEDICINE

## 2018-11-20 PROCEDURE — 80053 COMPREHEN METABOLIC PANEL: CPT

## 2018-11-20 PROCEDURE — 81003 URINALYSIS AUTO W/O SCOPE: CPT

## 2018-11-20 PROCEDURE — 86618 LYME DISEASE ANTIBODY: CPT

## 2018-11-20 PROCEDURE — 36415 COLL VENOUS BLD VENIPUNCTURE: CPT

## 2018-11-20 PROCEDURE — 85027 COMPLETE CBC AUTOMATED: CPT

## 2018-11-20 RX ORDER — METAXALONE 800 MG/1
800 TABLET ORAL EVERY 6 HOURS PRN
Qty: 90 TABLET | Refills: 0 | Status: SHIPPED | OUTPATIENT
Start: 2018-11-20 | End: 2018-12-04 | Stop reason: CLARIF

## 2018-11-20 RX ORDER — IBUPROFEN 800 MG/1
800 TABLET ORAL EVERY 6 HOURS PRN
Qty: 90 TABLET | Refills: 0 | Status: CANCELLED | OUTPATIENT
Start: 2018-11-20

## 2018-11-20 RX ORDER — BIOTIN 1 MG
1000 TABLET ORAL DAILY
COMMUNITY

## 2018-11-20 RX ORDER — OMEPRAZOLE 40 MG/1
40 CAPSULE, DELAYED RELEASE ORAL DAILY
Qty: 14 CAPSULE | Refills: 0 | Status: SHIPPED | OUTPATIENT
Start: 2018-11-20 | End: 2020-03-27 | Stop reason: ALTCHOICE

## 2018-11-20 RX ORDER — MAG HYDROX/ALUMINUM HYD/SIMETH 400-400-40
SUSPENSION, ORAL (FINAL DOSE FORM) ORAL
COMMUNITY

## 2018-11-20 NOTE — PATIENT INSTRUCTIONS

## 2018-11-20 NOTE — PROGRESS NOTES
ADULT ANNUAL PHYSICAL    [facility] KECIA SOMMER    NAME: Franco Golden  AGE: 44 y o  SEX: female  : 1975     DATE: 2018     Assessment and Plan:     Assessment  1  Adult general medical examination   2  BMI 30 0-30 9,adult   3  Chronic fatigue   4  Daytime hypersomnolence   5  NSAID induced gastritis   6  Vitamin D deficiency   7  Hereditary spherocytosis ([address] )     Plan    Will perform further laboratory evaluation for patient's chronic fatigue as well as vitamin-D deficiency  Patient has possible symptoms of obstructive sleep apnea  She is at increased risk of sleep apnea due to her BMI greater than 30  We will order a home sleep study  Advised patient to stop consuming ibuprofen or Aleve  Will put her on a 2 week course of omeprazole  Orders Placed This Encounter   Procedures    Vitamin D 25 hydroxy    Lyme Antibody Profile with reflex to WB    CBC and Platelet    Comprehensive metabolic panel    TSH, 3rd generation with Free T4 reflex    UA w Reflex to Microscopic w Reflex to Culture    Home Study     New Medications Ordered This Visit   Medications    omeprazole (PriLOSEC) 40 MG capsule     Sig: Take 1 capsule (40 mg total) by mouth daily     Dispense:  14 capsule     Refill:  0     Health maintenance and preventative care screenings were discussed with patient today  Appropriate education was printed on patient's after visit summary  · Discussed risks/benefits of screening for breast cancer, cervical cancer, high cholesterol and diabetes  Patient is up-to-date with their preventive screenings  · Immunizations were reviewed: patient is up-to-date with her immunizations  Counseling:  Dental Health: discussed importance of regular tooth brushing, flossing, and dental visits  Injury prevention: discussed safety/seat belts, safety helmets, smoke detectors, carbon dioxide detectors, and smoking near bedding or upholstery    BMI Counseling: Body mass index is 30 76 kg/m²  Discussed with patient's BMI with her  The BMI is above average  BMI counseling and education was provided to the patient  Nutrition recommendations include reducing portion sizes, decreasing overall calorie intake, 3-5 servings of fruits/vegetables daily, moderation in carbohydrate intake, increasing intake of lean protein and reducing intake of saturated fat and trans fat  Exercise recommendations include moderate aerobic physical activity for 150 minutes/week, exercising 3-5 times per week and joining a gym  Sexual health: discussed sexually transmitted diseases, partner selection, use of condoms, avoidance of unintended pregnancy, and contraceptive alternatives  · Alcohol/drug use: discussed moderation in alcohol intake and avoidance of illicit drug use  Return if symptoms worsen or fail to improve, for Annual Physical      Chief Complaint:     Chief Complaint   Patient presents with    [facility]     new pt    Annual Exam      History of Present Illness:     Adult Annual Physical   Patient here for a comprehensive physical exam   Patient presents to establish care  Patient has a history of hereditary spherocytosis and had a splenectomy at age 7  More recently patient has been dealing with excessive fatigue for the past weeks to months  Patient feels like she is always hungry  Patient feels like she sleeps at least 7-8 hours a night, but then wakes up and has unrefreshing sleep  Sometimes patient has morning headaches or dizziness  She has had difficulty losing weight  She has been taking more ibuprofen/Aleve for aches and pains  Since she started doing that she has noticed increased burning in her stomach  She denies any change in her epigastric pain with food intake  She denies any melena or hematochezia  She denies any nausea, vomiting, constipation, diarrhea  She denies any history of cardiovascular disease    Patient had a normal cholesterol and A1c back in June of 2018  Patient got her flu shot [facility]  Patient works out front at registration  Patient also has a history of vitamin-D deficiency and currently takes 5000 units of vitamin-D daily  Patient denies any new rashes, lumps, bumps  Patient does not exercise on a regular basis  Diet and Physical Activity  · Diet/Nutrition: poor diet and frequent junk food  · Weight concerns: patient has class 1 obesity (BMI 30-34 9)  · Exercise: no formal exercise and sedentary at work  Depression Screening  PHQ-9 Depression Screening    PHQ-9:    Frequency of the following problems over the past two weeks:       Little interest or pleasure in doing things:  0 - not at all  Feeling down, depressed, or hopeless:  0 - not at all  PHQ-2 Score:  0       General Health  · Sleep: gets 7-8 hours of sleep on average, experiences daytime hypersomnolence and unrefreshing sleep  · Hearing: normal - bilateral   · Vision: goes for regular eye exams and wears glasses  · Dental: regular dental visits and brushes teeth twice daily  /GYN Health  · Patient is UTD with PAP smear  · She follows regularly with GYN  · Previous history of hysterectomy     Review of Systems:     Review of Systems   Constitutional: Positive for fatigue  Negative for appetite change, chills and fever  HENT: Negative for congestion, hearing loss, postnasal drip, rhinorrhea, sore throat, tinnitus and trouble swallowing  Eyes: Negative for pain, discharge, redness and visual disturbance  Respiratory: Negative for cough, chest tightness, shortness of breath and wheezing  Cardiovascular: Negative for chest pain, palpitations and leg swelling  Gastrointestinal: Negative for abdominal distention, abdominal pain, blood in stool, constipation, diarrhea, nausea and vomiting  Endocrine: Negative for cold intolerance, heat intolerance, polydipsia, polyphagia and polyuria     Genitourinary: Negative for difficulty urinating, dysuria, frequency, hematuria and urgency  Musculoskeletal: Positive for neck stiffness  Negative for arthralgias, back pain, gait problem, joint swelling, myalgias and neck pain  Skin: Negative for color change and rash  Neurological: Positive for headaches  Negative for dizziness, tremors, seizures, syncope, speech difficulty, weakness, light-headedness and numbness  Hematological: Negative for adenopathy  Does not bruise/bleed easily  Psychiatric/Behavioral: Negative for agitation, behavioral problems, confusion, hallucinations, sleep disturbance and suicidal ideas  The patient is not nervous/anxious          Past Medical History:     Past Medical History:   Diagnosis Date    Fibroid     [address]red[address] sp[address] [address] )       Past Surgical History:     Past Surgical History:   Procedure Laterality Date    HYSTERECTOMY N/A     SPLENECTOMY      WISDOM TOOTH EXTRACTION        Social History:     Social History    Marital status: /Civil Union     Spouse name: N/A    Number of children: N/A    Years of education: N/A     Social History Main Topics    Smoking status: Never Smoker    Smokeless tobacco: Never Used    Alcohol use Yes      Comment: social    Drug use: No    Sexual activity: Yes     Partners: Male      Family History:     Family History   Problem Relation Age of Onset    Arthritis Mother     Hypertension Family       Current Medications:     Current Outpatient Prescriptions   Medication Sig Dispense Refill    Biotin 1000 MCG tablet Take 1,000 mcg by mouth daily      Cholecalciferol (VITAMIN D3) 5000 units CAPS Take by mouth      EVENING PRIMROSE OIL PO Take by mouth      FOLIC ACID PO Take by mouth      metaxalone (SKELAXIN) 800 mg tablet Take 1 tablet (800 mg total) by mouth every 6 (six) hours as needed for muscle spasms 90 tablet 0    multivitamin (THERAGRAN) TABS Take 1 tablet by mouth daily      spironolactone (ALDACTONE) 50 mg tablet TAKE 1 TABLET (50 MG TOTAL) BY MOUTH DAILY 30 tablet 2    omeprazole (PriLOSEC) 40 MG capsule Take 1 capsule (40 mg total) by mouth daily 14 capsule 0     No current facility-administered medications for this visit  Allergies: Allergies   Allergen Reactions    Latex Hives and Rash     Contact-GLOVES    Shellfish Allergy Hives, Rash and Throat Swelling      Objective:     /78 (BP Location: Left arm, Patient Position: Sitting, Cuff Size: Standard)   Pulse 82   Ht 5' 6" (1 676 m)   Wt 86 5 kg (190 lb 9 6 oz)   SpO2 98%   BMI 30 76 kg/m²      Physical Exam   Constitutional: She is oriented to person, place, and time  She appears well-developed and well-nourished  No distress  Obesity   HENT:   Head: Normocephalic and atraumatic  Right Ear: External ear normal    Left Ear: External ear normal    Nose: Nose normal    Mouth/Throat: Oropharynx is clear and moist  No oropharyngeal exudate  Crowded oropharynx   Eyes: Conjunctivae are normal  Right eye exhibits no discharge  Left eye exhibits no discharge  No scleral icterus  Neck: Neck supple  No JVD present  No thyromegaly present  Increased neck circumference   Cardiovascular: Normal rate, regular rhythm and normal heart sounds  No murmur heard  Pulmonary/Chest: Effort normal and breath sounds normal  No respiratory distress  She has no wheezes  She has no rales  She exhibits no tenderness  Abdominal: Soft  Bowel sounds are normal  She exhibits no distension and no mass  There is no tenderness  There is no rebound and no guarding  No hernia  Musculoskeletal: She exhibits no edema  Lymphadenopathy:     She has no cervical adenopathy  Neurological: She is alert and oriented to person, place, and time  Skin: Skin is warm and dry  She is not diaphoretic  Psychiatric: She has a normal mood and affect  Her behavior is normal    Vitals reviewed       Health Maintenance:     Health Maintenance Topics with due status: Not Due       Topic Last Completion Date    DTaP,Tdap,and Td Vaccines 03/23/2010    MAMMOGRAM 03/14/2018    PAP SMEAR 03/14/2018    Depression Screening PHQ 11/20/2018     The patient has no Health Maintenance topics of status Overdue     Immunization History   Administered Date(s) Administered    Influenza 10/30/2018    MMR 01/09/2018    Tdap 03/23/2010       Tomeka Glez, DO  [facility]

## 2018-11-21 ENCOUNTER — TELEPHONE (OUTPATIENT)
Dept: INTERNAL MEDICINE CLINIC | Facility: CLINIC | Age: 43
End: 2018-11-21

## 2018-11-21 LAB
B BURGDOR IGG SER IA-ACNC: 0.07
B BURGDOR IGM SER IA-ACNC: 0.29

## 2018-11-21 NOTE — TELEPHONE ENCOUNTER
----- Message from Isaac Beltre DO sent at 11/21/2018  2:53 PM EST -----  Call patient and let her know there was no significant abnormalities on blood work, except platelet count was slightly high  We will just monitor this for now

## 2018-11-23 ENCOUNTER — HOSPITAL ENCOUNTER (OUTPATIENT)
Dept: SLEEP CENTER | Facility: CLINIC | Age: 43
Discharge: HOME | End: 2018-11-23
Payer: COMMERCIAL

## 2018-11-23 DIAGNOSIS — G47.19 DAYTIME HYPERSOMNOLENCE: ICD-10-CM

## 2018-11-23 DIAGNOSIS — R53.82 CHRONIC FATIGUE: ICD-10-CM

## 2018-11-23 PROCEDURE — G0399 HOME SLEEP TEST/TYPE 3 PORTA: HCPCS

## 2018-11-23 PROCEDURE — 95806 SLEEP STUDY UNATT&RESP EFFT: CPT | Performed by: INTERNAL MEDICINE

## 2018-12-04 ENCOUNTER — TELEPHONE (OUTPATIENT)
Dept: INTERNAL MEDICINE CLINIC | Facility: CLINIC | Age: 43
End: 2018-12-04

## 2018-12-04 DIAGNOSIS — M62.838 MUSCLE SPASM: Primary | ICD-10-CM

## 2018-12-04 RX ORDER — METHOCARBAMOL 750 MG/1
750 TABLET, FILM COATED ORAL EVERY 6 HOURS PRN
Qty: 90 TABLET | Refills: 0 | Status: SHIPPED | OUTPATIENT
Start: 2018-12-04 | End: 2019-01-06 | Stop reason: SDUPTHER

## 2018-12-06 ENCOUNTER — TELEPHONE (OUTPATIENT)
Dept: SLEEP CENTER | Facility: CLINIC | Age: 43
End: 2018-12-06

## 2018-12-06 NOTE — TELEPHONE ENCOUNTER
Left message for patient that results of home sleep test are complete and there was no evidence of obstructive sleep apnea  Instructed to follow up with her PCP or call the sleep center if she has any questions

## 2018-12-13 ENCOUNTER — TELEPHONE (OUTPATIENT)
Dept: BARIATRICS | Facility: CLINIC | Age: 43
End: 2018-12-13

## 2019-01-05 DIAGNOSIS — M62.838 MUSCLE SPASM: ICD-10-CM

## 2019-01-06 RX ORDER — METHOCARBAMOL 750 MG/1
750 TABLET, FILM COATED ORAL EVERY 6 HOURS PRN
Qty: 90 TABLET | Refills: 0 | Status: SHIPPED | OUTPATIENT
Start: 2019-01-06 | End: 2020-06-17 | Stop reason: ALTCHOICE

## 2019-01-12 DIAGNOSIS — L70.0 ACNE VULGARIS: ICD-10-CM

## 2019-01-13 RX ORDER — SPIRONOLACTONE 50 MG/1
50 TABLET, FILM COATED ORAL DAILY
Qty: 30 TABLET | Refills: 2 | OUTPATIENT
Start: 2019-01-13

## 2019-01-30 ENCOUNTER — OFFICE VISIT (OUTPATIENT)
Dept: DERMATOLOGY | Facility: CLINIC | Age: 44
End: 2019-01-30
Payer: COMMERCIAL

## 2019-01-30 DIAGNOSIS — L56.8 PHOTOSENSITIVITY DERMATITIS: ICD-10-CM

## 2019-01-30 DIAGNOSIS — L70.0 ACNE VULGARIS: Primary | ICD-10-CM

## 2019-01-30 PROCEDURE — 99213 OFFICE O/P EST LOW 20 MIN: CPT | Performed by: DERMATOLOGY

## 2019-01-30 RX ORDER — SPIRONOLACTONE 50 MG/1
50 TABLET, FILM COATED ORAL DAILY
Qty: 60 TABLET | Refills: 5 | Status: SHIPPED | OUTPATIENT
Start: 2019-01-30 | End: 2019-12-09 | Stop reason: ALTCHOICE

## 2019-01-30 NOTE — LETTER
January 30, 2019     Patient: Amador Thomas   YOB: 1975   Date of Visit: 1/30/2019       To Whom it May Concern:    Amador Thomas is under my professional care  Please excuse her absence from work yesterday 01/29/2019  Gabrielle Skipper She may return to work on 01/30/2019  If you have any questions or concerns, please don't hesitate to call           Sincerely,          Melba De La Garza MD

## 2019-01-30 NOTE — PROGRESS NOTES
[facility] DERMATOLOGY  [address]  [place]  [phone]  [phone]     MRN: [id number] : 1975  Encounter: [id number]  Patient Information: Esperanza Ribera  Chief complaint:  Follow-up for acne and facial rash    History of present illness:  60-year-old female recently in [place] developed and irritation itching rash in this to her face patient has been using both benzyl peroxide Differin gel a will call with spironolactone to control her acne which has been under good control  No other new medications or any other contact noted she did use some sun protection while in Hospitals in [place]  Past Medical History:   Diagnosis Date    Fibroid     [address]red[address] [address] )      Past Surgical History:   Procedure Laterality Date    HYSTERECTOMY N/A     SPLENECTOMY      WISDOM TOOTH EXTRACTION       Social History   History   Alcohol Use    Yes     Comment: social     History   Drug Use No     History   Smoking Status    Never Smoker   Smokeless Tobacco    Never Used     Family History   Problem Relation Age of Onset    Arthritis Mother     Hypertension Family      Meds/Allergies   Allergies   Allergen Reactions    Latex Hives and Rash     Contact-GLOVES    Shellfish Allergy Hives, Rash and Throat Swelling       Meds:  Prior to Admission medications    Medication Sig Start Date End Date Taking?  Authorizing Provider   Biotin 1000 MCG tablet Take 1,000 mcg by mouth daily   Yes Historical Provider, MD   Cholecalciferol (VITAMIN D3) 5000 units CAPS Take by mouth   Yes Historical Provider, MD   EVENING PRIMROSE OIL PO Take by mouth   Yes Historical Provider, MD   FOLIC ACID PO Take by mouth   Yes Historical Provider, MD   methocarbamol (ROBAXIN) 750 mg tablet TAKE 1 TABLET (750 MG TOTAL) BY MOUTH EVERY 6 (SIX) HOURS AS NEEDED FOR MUSCLE SPASMS 19  Yes Renny Chen DO   multivitamin (THERAGRAN) TABS Take 1 tablet by mouth daily   Yes Historical MD Albert   omeprazole (PriLOSEC) 40 MG capsule Take 1 capsule (40 mg total) by mouth daily 11/20/18  Yes Toni Chen DO   spironolactone (ALDACTONE) 50 mg tablet TAKE 1 TABLET (50 MG TOTAL) BY MOUTH DAILY 10/2/18  Yes Isma Mckinley MD       Subjective:     Review of Systems:    General: negative for - chills, fatigue, fever,  weight gain or weight loss  Psychological: negative for - anxiety, behavioral disorder, concentration difficulties, decreased libido, depression, irritability, memory difficulties, mood swings, sleep disturbances or suicidal ideation  ENT: negative for - hearing difficulties , nasal congestion, nasal discharge, oral lesions, sinus pain, sneezing, sore throat  Allergy and Immunology: negative for - hives, insect bite sensitivity,  Hematological and Lymphatic: negative for - bleeding problems, blood clots,bruising, swollen lymph nodes  Endocrine: negative for - hair pattern changes, hot flashes, malaise/lethargy, mood swings, palpitations, polydipsia/polyuria, skin changes, temperature intolerance or unexpected weight change  Respiratory: negative for - cough, hemoptysis, orthopnea, shortness of breath, or wheezing  Cardiovascular: negative for - chest pain, dyspnea on exertion, edema,  Gastrointestinal: negative for - abdominal pain, nausea/vomiting  Genito-Urinary: negative for - dysuria, incontinence, irregular/heavy menses or urinary frequency/urgency  Musculoskeletal: negative for - gait disturbance, joint pain, joint stiffness, joint swelling, muscle pain, muscular weakness  Dermatological:  As in HPI  Neurological: negative for confusion, dizziness, headaches, impaired coordination/balance, memory loss, numbness/tingling, seizures, speech problems, tremors or weakness       Objective: There were no vitals taken for this visit      Physical Exam:    General Appearance:    Alert, cooperative, no distress   Head:    Normocephalic, without obvious abnormality, atraumatic Lymphatics:    No lymphadenopathy noted      Abdomen:   No hepatosplenomegaly   Skin:   A full skin exam was performed including scalp, head scalp, eyes, ears, nose, lips, neck, no facial acne noted mild erythema small papules noted on scattered on her face     Assessment:     1  Acne vulgaris     2  Photosensitivity dermatitis           Plan:   Question of reaction to the sun with the use of Differin gel difficult to assess at this time if this process continues or is an ongoing process would consider further workup will go ahead treat with mild hydrocortisone cream for short period of time  follow-up if no improvement noted  Acne under good control continue same therapy  Audra Cruz MD  1/30/2019,8:34 AM    Portions of the record may have been created with voice recognition software   Occasional wrong word or "sound a like" substitutions may have occurred due to the inherent limitations of voice recognition software   Read the chart carefully and recognize, using context, where substitutions have occurred

## 2019-01-30 NOTE — PATIENT INSTRUCTIONS
Question of reaction to the sun with the use of Differin gel difficult to assess at this time if this process continues or is an ongoing process would consider further workup will go ahead treat with mild hydrocortisone cream for short period of time  follow-up if no improvement noted    Acne under good control continue same therapy

## 2019-03-26 ENCOUNTER — ANNUAL EXAM (OUTPATIENT)
Dept: OBGYN CLINIC | Age: 44
End: 2019-03-26
Payer: COMMERCIAL

## 2019-03-26 VITALS
DIASTOLIC BLOOD PRESSURE: 62 MMHG | HEIGHT: 66 IN | SYSTOLIC BLOOD PRESSURE: 122 MMHG | WEIGHT: 196 LBS | BODY MASS INDEX: 31.5 KG/M2

## 2019-03-26 DIAGNOSIS — Z01.419 ENCOUNTER FOR GYNECOLOGICAL EXAMINATION WITHOUT ABNORMAL FINDING: Primary | ICD-10-CM

## 2019-03-26 DIAGNOSIS — B37.3 CANDIDIASIS OF FEMALE GENITALIA: ICD-10-CM

## 2019-03-26 DIAGNOSIS — Z12.31 ENCOUNTER FOR SCREENING MAMMOGRAM FOR MALIGNANT NEOPLASM OF BREAST: ICD-10-CM

## 2019-03-26 DIAGNOSIS — Z11.3 SCREENING FOR STDS (SEXUALLY TRANSMITTED DISEASES): ICD-10-CM

## 2019-03-26 PROCEDURE — 87591 N.GONORRHOEAE DNA AMP PROB: CPT | Performed by: NURSE PRACTITIONER

## 2019-03-26 PROCEDURE — 99396 PREV VISIT EST AGE 40-64: CPT | Performed by: NURSE PRACTITIONER

## 2019-03-26 PROCEDURE — 87491 CHLMYD TRACH DNA AMP PROBE: CPT | Performed by: NURSE PRACTITIONER

## 2019-03-26 RX ORDER — FLUCONAZOLE 150 MG/1
150 TABLET ORAL ONCE
Qty: 1 TABLET | Refills: 6 | Status: SHIPPED | OUTPATIENT
Start: 2019-03-26 | End: 2019-03-26

## 2019-03-26 NOTE — PATIENT INSTRUCTIONS
Vulvovaginal Candidiasis   WHAT YOU NEED TO KNOW:   What is vulvovaginal candidiasis? Vulvovaginal candidiasis, or yeast infection, is a common vaginal infection  Vulvovaginal candidiasis is caused by a fungus, or yeast-like germ  Fungi are normally found in your vagina  When there are too many fungi, it can cause an infection  What increases my risk for vulvovaginal candidiasis? · Pregnancy    · Medical conditions that suppress your immune system, such as diabetes or HIV and AIDS    · Medicines, such as antibiotics, birth control pills, or steroid medicine    · Contraceptive devices, such as diaphragms, sponges, and intrauterine devices  What are the signs and symptoms of vulvovaginal candidiasis? · Thick, white, cheese-like discharge from your vagina    · Itching, swelling, or redness in your vagina    · Burning when you urinate  How is vulvovaginal candidiasis diagnosed? Your healthcare provider will ask about your medical history and examine you  A sample of your vaginal discharge may show what germ is causing your infection  How is vulvovaginal candidiasis treated? Medicines help treat the fungal infection and decrease inflammation  The medicine may be a pill, topical cream, or vaginal suppository  With treatment, the infection is usually gone within a week: How can I manage my symptoms? · Wear cotton underwear  · Keep the vaginal area clean and dry  · Wipe from front to back after you urinate or have a bowel movement  · Do not have sex until your symptoms are gone  · Do not douche  · Do not use strong perfumes or soaps  · Do not use feminine hygiene sprays, powders, or bubble bath  How can I prevent another infection? · Take showers instead of baths  · Eat yogurt  · Limit the amount of alcohol you drink  · Limit your time in hot tubs  · Control your blood sugar if you are diabetic  When should I seek immediate care? · You have fever and chills      · You are bleeding from your vagina and it is not your monthly period  · You develop abdominal or pelvic pain  When should I contact my healthcare provider? · Your signs and symptoms get worse, even after treatment  · You have questions or concerns about your condition or care  CARE AGREEMENT:   You have the right to help plan your care  Learn about your health condition and how it may be treated  Discuss treatment options with your caregivers to decide what care you want to receive  You always have the right to refuse treatment  The above information is an  only  It is not intended as medical advice for individual conditions or treatments  Talk to your doctor, nurse or pharmacist before following any medical regimen to see if it is safe and effective for you  © 2017 [employer] Information is for End User's use only and may not be sold, redistributed or otherwise used for commercial purposes  All illustrations and images included in CareNotes® are the copyrighted property of A D [employer] M [employer] Inc  or Melvin Tomlinson

## 2019-03-26 NOTE — PROGRESS NOTES
Diagnoses and all orders for this visit:    Encounter for gynecological examination without abnormal finding    Encounter for screening mammogram for malignant neoplasm of breast  -     Mammo screening bilateral w 3d & cad; Future    Screening for STDs (sexually transmitted diseases)  -     Hepatitis B surface antigen  -     Hepatitis C antibody  -     HIV 1/2 AG-AB combo  -     RPR    Candidiasis of female genitalia  -     fluconazole (DIFLUCAN) 150 mg tablet; Take 1 tablet (150 mg total) by mouth once for 1 dose Take monthly for vaginal itching          Calcium/vit d inclusion in the diet discussed, call with any issues, SBE reinforced, all concerns addressed  Pleasant 44 y o  premenopausal female here for annual exam  She denies any issues with bleeding, HYSTERECTOMY for fibroids  Denies history of abnormal pap smears  Last Pap 2018, no further paps needed  Denies vaginal issues but does get monthly yeast infections  Denies pelvic pain   Sexually active without any concerns,     Past Medical History:   Diagnosis Date    Fibroid     Hereditary spherocytosis ([facility] [address] )      Past Surgical History:   Procedure Laterality Date    HYSTERECTOMY N/A     SPLENECTOMY      WISDOM TOOTH EXTRACTION       Family History   Problem Relation Age of Onset    Arthritis Mother     Hypertension Family     Breast cancer Neg Hx     Colon cancer Neg Hx     Ovarian cancer Neg Hx     Uterine cancer Neg Hx     Cervical cancer Neg Hx      Social History     Tobacco Use    Smoking status: Never Smoker    Smokeless tobacco: Never Used   Substance Use Topics    Alcohol use: Yes     Frequency: Monthly or less    Drug use: No       Current Outpatient Medications:     Biotin 1000 MCG tablet, Take 1,000 mcg by mouth daily, Disp: , Rfl:     Cholecalciferol (VITAMIN D3) 5000 units CAPS, Take by mouth, Disp: , Rfl:     EVENING PRIMROSE OIL PO, Take by mouth, Disp: , Rfl:     FOLIC ACID PO, Take by mouth, Disp: , Rfl:   hydrocortisone 2 5 % cream, Apply topically 2 (two) times a day Applied to face till rash resolved, Disp: 20 g, Rfl: 3    methocarbamol (ROBAXIN) 750 mg tablet, TAKE 1 TABLET (750 MG TOTAL) BY MOUTH EVERY 6 (SIX) HOURS AS NEEDED FOR MUSCLE SPASMS, Disp: 90 tablet, Rfl: 0    multivitamin (THERAGRAN) TABS, Take 1 tablet by mouth daily, Disp: , Rfl:     spironolactone (ALDACTONE) 50 mg tablet, Take 1 tablet (50 mg total) by mouth daily, Disp: 60 tablet, Rfl: 5    fluconazole (DIFLUCAN) 150 mg tablet, Take 1 tablet (150 mg total) by mouth once for 1 dose Take monthly for vaginal itching, Disp: 1 tablet, Rfl: 6    omeprazole (PriLOSEC) 40 MG capsule, Take 1 capsule (40 mg total) by mouth daily (Patient not taking: Reported on 3/26/2019), Disp: 14 capsule, Rfl: 0  Patient Active Problem List    Diagnosis Date Noted    Encounter for gynecological examination without abnormal finding 2019    Snoring     SAL (obstructive sleep apnea)     Post-splenectomy 2011    Hereditary spherocytosis (HCC) 2009       Allergies   Allergen Reactions    Latex Hives and Rash     Contact-GLOVES    Shellfish Allergy Hives, Rash and Throat Swelling       OB History    Para Term  AB Living   5 2 2   3     SAB TAB Ectopic Multiple Live Births   3       2      # Outcome Date GA Lbr Moiz/2nd Weight Sex Delivery Anes PTL Lv   5 SAB            4 SAB            3 SAB            2 Term            1 Term              Son is 12 and daughter is 6 yo  Works [employer] next door  Vitals:    19 0730   BP: 122/62   BP Location: Right arm   Patient Position: Sitting   Weight: 88 9 kg (196 lb)   Height: 5' 6" (1 676 m)     Body mass index is 31 64 kg/m²  Review of Systems   Constitutional: Negative for chills, fatigue, fever and unexpected weight change  Respiratory: Negative for shortness of breath  Gastrointestinal: Negative for anal bleeding, blood in stool, constipation and diarrhea  Genitourinary: Negative for difficulty urinating, dysuria and hematuria  Physical Exam   Constitutional: She appears well-developed and well-nourished  No distress  HENT:   Head: Normocephalic  Neck: Normal range of motion  Neck supple  Pulmonary: Effort normal   Breasts: bilateral without masses, skin changes or nipple discharge  Bilaterally soft and warm to touch  No areas of erythema or pain  Abdominal: Soft  Pelvic exam was performed with patient supine  No labial fusion  There is no rash, tenderness, lesion or injury on the right labia  There is no rash, tenderness, lesion or injury on the left labia  Uterus/CX surgically absent  Right adnexum displays no mass, no tenderness and no fullness  Left adnexum displays no mass, no tenderness and no fullness  No erythema or tenderness in the vagina  No foreign body in the vagina  No signs of injury around the vagina  No vaginal discharge found  Lymphadenopathy:        Right: No inguinal adenopathy present  Left: No inguinal adenopathy present

## 2019-03-27 LAB
C TRACH DNA SPEC QL NAA+PROBE: NEGATIVE
N GONORRHOEA DNA SPEC QL NAA+PROBE: NEGATIVE

## 2019-03-28 ENCOUNTER — HOSPITAL ENCOUNTER (OUTPATIENT)
Dept: MAMMOGRAPHY | Facility: CLINIC | Age: 44
Discharge: HOME | End: 2019-03-28
Payer: COMMERCIAL

## 2019-03-28 VITALS — WEIGHT: 196 LBS | HEIGHT: 67 IN | BODY MASS INDEX: 30.76 KG/M2

## 2019-03-28 DIAGNOSIS — Z12.31 ENCOUNTER FOR SCREENING MAMMOGRAM FOR MALIGNANT NEOPLASM OF BREAST: ICD-10-CM

## 2019-03-28 PROCEDURE — 77067 SCR MAMMO BI INCL CAD: CPT

## 2019-03-28 PROCEDURE — 77063 BREAST TOMOSYNTHESIS BI: CPT

## 2019-03-29 ENCOUNTER — APPOINTMENT (OUTPATIENT)
Dept: LAB | Facility: CLINIC | Age: 44
End: 2019-03-29
Payer: COMMERCIAL

## 2019-03-29 PROCEDURE — 86592 SYPHILIS TEST NON-TREP QUAL: CPT | Performed by: NURSE PRACTITIONER

## 2019-03-29 PROCEDURE — 87389 HIV-1 AG W/HIV-1&-2 AB AG IA: CPT | Performed by: NURSE PRACTITIONER

## 2019-03-29 PROCEDURE — 86803 HEPATITIS C AB TEST: CPT | Performed by: NURSE PRACTITIONER

## 2019-03-29 PROCEDURE — 36415 COLL VENOUS BLD VENIPUNCTURE: CPT | Performed by: NURSE PRACTITIONER

## 2019-03-29 PROCEDURE — 87340 HEPATITIS B SURFACE AG IA: CPT | Performed by: NURSE PRACTITIONER

## 2019-03-30 LAB
HBV SURFACE AG SER QL: NORMAL
HCV AB SER QL: NORMAL

## 2019-03-31 LAB — HIV 1+2 AB+HIV1 P24 AG SERPL QL IA: NORMAL

## 2019-04-01 LAB — RPR SER QL: NORMAL

## 2019-04-03 DIAGNOSIS — J01.90 ACUTE SINUSITIS, RECURRENCE NOT SPECIFIED, UNSPECIFIED LOCATION: Primary | ICD-10-CM

## 2019-04-03 RX ORDER — AZITHROMYCIN 250 MG/1
TABLET, FILM COATED ORAL
Qty: 6 TABLET | Refills: 0 | Status: SHIPPED | OUTPATIENT
Start: 2019-04-03 | End: 2019-04-08

## 2019-04-03 RX ORDER — FLUTICASONE PROPIONATE 50 MCG
2 SPRAY, SUSPENSION (ML) NASAL DAILY
Qty: 16 G | Refills: 3 | Status: SHIPPED | OUTPATIENT
Start: 2019-04-03 | End: 2019-12-09 | Stop reason: SDUPTHER

## 2019-06-20 DIAGNOSIS — J01.90 ACUTE SINUSITIS, RECURRENCE NOT SPECIFIED, UNSPECIFIED LOCATION: Primary | ICD-10-CM

## 2019-06-20 RX ORDER — AMOXICILLIN AND CLAVULANATE POTASSIUM 875; 125 MG/1; MG/1
1 TABLET, FILM COATED ORAL EVERY 12 HOURS SCHEDULED
Qty: 20 TABLET | Refills: 0 | Status: SHIPPED | OUTPATIENT
Start: 2019-06-20 | End: 2019-06-30

## 2019-06-20 RX ORDER — PREDNISONE 10 MG/1
TABLET ORAL
Qty: 30 TABLET | Refills: 0 | Status: SHIPPED | OUTPATIENT
Start: 2019-06-20 | End: 2019-06-30

## 2019-06-27 ENCOUNTER — OFFICE VISIT (OUTPATIENT)
Dept: INTERNAL MEDICINE CLINIC | Facility: CLINIC | Age: 44
End: 2019-06-27
Payer: COMMERCIAL

## 2019-06-27 VITALS
HEART RATE: 73 BPM | SYSTOLIC BLOOD PRESSURE: 112 MMHG | TEMPERATURE: 97.8 F | DIASTOLIC BLOOD PRESSURE: 88 MMHG | OXYGEN SATURATION: 97 %

## 2019-06-27 DIAGNOSIS — J32.9 SINUSITIS, UNSPECIFIED CHRONICITY, UNSPECIFIED LOCATION: ICD-10-CM

## 2019-06-27 DIAGNOSIS — R42 VERTIGO: Primary | ICD-10-CM

## 2019-06-27 PROCEDURE — 99214 OFFICE O/P EST MOD 30 MIN: CPT | Performed by: PHYSICIAN ASSISTANT

## 2019-12-09 ENCOUNTER — OFFICE VISIT (OUTPATIENT)
Dept: FAMILY MEDICINE CLINIC | Facility: CLINIC | Age: 44
End: 2019-12-09
Payer: COMMERCIAL

## 2019-12-09 VITALS
OXYGEN SATURATION: 97 % | BODY MASS INDEX: 30.61 KG/M2 | HEART RATE: 82 BPM | SYSTOLIC BLOOD PRESSURE: 108 MMHG | TEMPERATURE: 97.6 F | HEIGHT: 67 IN | WEIGHT: 195 LBS | DIASTOLIC BLOOD PRESSURE: 72 MMHG

## 2019-12-09 DIAGNOSIS — H66.90 ACUTE OTITIS MEDIA, UNSPECIFIED OTITIS MEDIA TYPE: Primary | ICD-10-CM

## 2019-12-09 DIAGNOSIS — J01.90 ACUTE SINUSITIS, RECURRENCE NOT SPECIFIED, UNSPECIFIED LOCATION: ICD-10-CM

## 2019-12-09 PROCEDURE — 3008F BODY MASS INDEX DOCD: CPT | Performed by: NURSE PRACTITIONER

## 2019-12-09 PROCEDURE — 99213 OFFICE O/P EST LOW 20 MIN: CPT | Performed by: NURSE PRACTITIONER

## 2019-12-09 PROCEDURE — 1036F TOBACCO NON-USER: CPT | Performed by: NURSE PRACTITIONER

## 2019-12-09 RX ORDER — AMOXICILLIN AND CLAVULANATE POTASSIUM 875; 125 MG/1; MG/1
1 TABLET, FILM COATED ORAL EVERY 12 HOURS SCHEDULED
Qty: 14 TABLET | Refills: 0 | Status: SHIPPED | OUTPATIENT
Start: 2019-12-09 | End: 2019-12-16

## 2019-12-09 RX ORDER — FLUTICASONE PROPIONATE 50 MCG
2 SPRAY, SUSPENSION (ML) NASAL DAILY
Qty: 16 G | Refills: 3 | Status: SHIPPED | OUTPATIENT
Start: 2019-12-09

## 2019-12-09 RX ORDER — SPIRONOLACTONE 50 MG/1
50 TABLET, FILM COATED ORAL DAILY
COMMUNITY
End: 2020-11-02 | Stop reason: ALTCHOICE

## 2019-12-09 RX ORDER — FLUCONAZOLE 150 MG/1
150 TABLET ORAL ONCE
Qty: 2 TABLET | Refills: 0 | Status: SHIPPED | OUTPATIENT
Start: 2019-12-09 | End: 2019-12-09

## 2019-12-09 NOTE — PROGRESS NOTES
Assessment/Plan:     Chronic Problems:  No problem-specific Assessment & Plan notes found for this encounter  Visit Diagnosis:  Diagnoses and all orders for this visit:    Acute otitis media, unspecified otitis media type  Comments: Will treat with augmentin for 7 days  Orders:  -     amoxicillin-clavulanate (AUGMENTIN) 875-125 mg per tablet; Take 1 tablet by mouth every 12 (twelve) hours for 7 days  -     fluconazole (DIFLUCAN) 150 mg tablet; Take 1 tablet (150 mg total) by mouth once for 1 dose Take one dose today and one dose in 2-3 days if feeling symptoms  Acute sinusitis, recurrence not specified, unspecified location  -     fluticasone (FLONASE) 50 mcg/act nasal spray; 2 sprays into each nostril daily    Other orders  -     spironolactone (ALDACTONE) 50 mg tablet; Take 50 mg by mouth daily          Subjective:    Patient ID: Stuart Anderson is a 38 y o  female  Here for sick visit  Started on Friday with a a headache  Very sore throat  More right side than left  Ear pain/pressure, often gets sinus infections  Nothing OTC for this  Takes allergy medicine everyday  The following portions of the patient's history were reviewed and updated as appropriate: allergies, current medications, past family history, past medical history, past social history, past surgical history and problem list     Review of Systems   Constitutional: Positive for fatigue  Negative for chills and fever  HENT: Positive for congestion, ear pain, sinus pressure, sinus pain, sore throat, trouble swallowing and voice change  Eyes: Negative  Respiratory: Negative for chest tightness, shortness of breath and wheezing  Cardiovascular: Negative for chest pain and palpitations  Gastrointestinal: Positive for diarrhea and nausea  Neurological: Positive for dizziness and headaches  Negative for light-headedness           /72   Pulse 82   Temp 97 6 °F (36 4 °C) (Tympanic)   Ht 5' 7" (1 702 m)   Wt 88 5 kg (195 lb)   SpO2 97%   BMI 30 54 kg/m²   Social History     Socioeconomic History    Marital status: /Civil Union     Spouse name: Not on file    Number of children: Not on file    Years of education: Not on file    Highest education level: Not on file   Occupational History    Not on file   Social Needs    Financial resource strain: Not on file    Food insecurity:     Worry: Not on file     Inability: Not on file    Transportation needs:     Medical: Not on file     Non-medical: Not on file   Tobacco Use    Smoking status: Never Smoker    Smokeless tobacco: Never Used   Substance and Sexual Activity    Alcohol use: Yes     Frequency: Monthly or less    Drug use: No    Sexual activity: Yes     Partners: Male     Birth control/protection: Surgical   Lifestyle    Physical activity:     Days per week: Not on file     Minutes per session: Not on file    Stress: Not on file   Relationships    Social connections:     Talks on phone: Not on file     Gets together: Not on file     Attends Christianity service: Not on file     Active member of club or organization: Not on file     Attends meetings of clubs or organizations: Not on file     Relationship status: Not on file    Intimate partner violence:     Fear of current or ex partner: Not on file     Emotionally abused: Not on file     Physically abused: Not on file     Forced sexual activity: Not on file   Other Topics Concern    Not on file   Social History Narrative    Not on file     Past Medical History:   Diagnosis Date    Fibroid     [facility]reditary spherocytosis ([facility] [address] )      Family History   Problem Relation Age of Onset    Arthritis Mother     Hypertension Family     Breast cancer Neg Hx     Colon cancer Neg Hx     Ovarian cancer Neg Hx     Uterine cancer Neg Hx     Cervical cancer Neg Hx      Past Surgical History:   Procedure Laterality Date    HYSTERECTOMY N/A     SPLENECTOMY      WISDOM TOOTH EXTRACTION         Current Outpatient Medications:     Biotin 1000 MCG tablet, Take 1,000 mcg by mouth daily, Disp: , Rfl:     Cholecalciferol (VITAMIN D3) 5000 units CAPS, Take by mouth, Disp: , Rfl:     EVENING PRIMROSE OIL PO, Take by mouth, Disp: , Rfl:     fluticasone (FLONASE) 50 mcg/act nasal spray, 2 sprays into each nostril daily, Disp: 16 g, Rfl: 3    FOLIC ACID PO, Take by mouth, Disp: , Rfl:     hydrocortisone 2 5 % cream, Apply topically 2 (two) times a day Applied to face till rash resolved, Disp: 20 g, Rfl: 3    methocarbamol (ROBAXIN) 750 mg tablet, TAKE 1 TABLET (750 MG TOTAL) BY MOUTH EVERY 6 (SIX) HOURS AS NEEDED FOR MUSCLE SPASMS, Disp: 90 tablet, Rfl: 0    multivitamin (THERAGRAN) TABS, Take 1 tablet by mouth daily, Disp: , Rfl:     omeprazole (PriLOSEC) 40 MG capsule, Take 1 capsule (40 mg total) by mouth daily, Disp: 14 capsule, Rfl: 0    spironolactone (ALDACTONE) 50 mg tablet, Take 50 mg by mouth daily, Disp: , Rfl:     amoxicillin-clavulanate (AUGMENTIN) 875-125 mg per tablet, Take 1 tablet by mouth every 12 (twelve) hours for 7 days, Disp: 14 tablet, Rfl: 0    fluconazole (DIFLUCAN) 150 mg tablet, Take 1 tablet (150 mg total) by mouth once for 1 dose Take one dose today and one dose in 2-3 days if feeling symptoms  , Disp: 2 tablet, Rfl: 0    Allergies   Allergen Reactions    Latex Hives and Rash     Contact-GLOVES    Shellfish Allergy Hives, Rash and Throat Swelling          Lab Review   No visits with results within 2 Month(s) from this visit  Latest known visit with results is:   Annual Exam on 03/26/2019   Component Date Value    Hepatitis B Surface Ag 03/29/2019 Non-reactive     Hepatitis C Ab 03/29/2019 Non-reactive     HIV-1/HIV-2 Ab 03/29/2019 Non-Reactive     RPR 03/29/2019 Non-Reactive     N gonorrhoeae, DNA Probe 03/26/2019 Negative     Chlamydia trachomatis, D* 03/26/2019 Negative         Imaging: No results found  Objective:     Physical Exam   Constitutional: She appears distressed  HENT:   Right Ear: There is tenderness  Tympanic membrane is bulging  A middle ear effusion is present  Left Ear: Tympanic membrane is not bulging  No middle ear effusion  Nose: Right sinus exhibits maxillary sinus tenderness and frontal sinus tenderness  Mouth/Throat: Posterior oropharyngeal erythema present  Eyes: Conjunctivae are normal    Cardiovascular: Normal rate, regular rhythm and normal heart sounds  No murmur heard  Pulmonary/Chest: Effort normal and breath sounds normal  No respiratory distress  She has no wheezes  Lymphadenopathy:     She has cervical adenopathy  Patient Instructions   Take antibiotic with meals twice a day for 7 days  Lots of rest and lots of fluid  Aleksandra E Hetu, CRNP    Portions of the record may have been created with voice recognition software  Occasional wrong word or "sound a like" substitutions may have occurred due to the inherent limitations of voice recognition software  Read the chart carefully and recognize, using context, where substitutions have occurred

## 2019-12-09 NOTE — LETTER
December 9, 2019     Patient: London Hill   YOB: 1975   Date of Visit: 12/9/2019       To Whom it May Concern:    London Hill is under my professional care  She was seen in my office on 12/9/2019  She may return to work on 12/12/2019  If you have any questions or concerns, please don't hesitate to call           Sincerely,          BRITTAYN Looney        CC: No Recipients

## 2019-12-11 DIAGNOSIS — H66.90 ACUTE OTITIS MEDIA, UNSPECIFIED OTITIS MEDIA TYPE: Primary | ICD-10-CM

## 2019-12-11 RX ORDER — PREDNISONE 20 MG/1
20 TABLET ORAL DAILY
Qty: 5 TABLET | Refills: 0 | Status: SHIPPED | OUTPATIENT
Start: 2019-12-11 | End: 2019-12-16

## 2020-01-28 ENCOUNTER — CLINICAL SUPPORT (OUTPATIENT)
Dept: FAMILY MEDICINE CLINIC | Facility: CLINIC | Age: 45
End: 2020-01-28
Payer: COMMERCIAL

## 2020-01-28 DIAGNOSIS — N34.2 INFECTIVE URETHRITIS: Primary | ICD-10-CM

## 2020-01-28 DIAGNOSIS — B37.9 YEAST INFECTION: ICD-10-CM

## 2020-01-28 DIAGNOSIS — N39.0 URINARY TRACT INFECTION WITHOUT HEMATURIA, SITE UNSPECIFIED: Primary | ICD-10-CM

## 2020-01-28 LAB
SL AMB  POCT GLUCOSE, UA: NEGATIVE
SL AMB LEUKOCYTE ESTERASE,UA: 70
SL AMB POCT BILIRUBIN,UA: NEGATIVE
SL AMB POCT BLOOD,UA: NEGATIVE
SL AMB POCT CLARITY,UA: CLEAR
SL AMB POCT COLOR,UA: ABNORMAL
SL AMB POCT KETONES,UA: 5
SL AMB POCT NITRITE,UA: NEGATIVE
SL AMB POCT PH,UA: 6.5
SL AMB POCT SPECIFIC GRAVITY,UA: 1.02
SL AMB POCT URINE PROTEIN: 15
SL AMB POCT UROBILINOGEN: NEGATIVE

## 2020-01-28 PROCEDURE — 81002 URINALYSIS NONAUTO W/O SCOPE: CPT | Performed by: NURSE PRACTITIONER

## 2020-01-28 RX ORDER — NITROFURANTOIN 25; 75 MG/1; MG/1
100 CAPSULE ORAL 2 TIMES DAILY
Qty: 10 CAPSULE | Refills: 0 | Status: SHIPPED | OUTPATIENT
Start: 2020-01-28 | End: 2020-02-02

## 2020-01-28 RX ORDER — FLUCONAZOLE 150 MG/1
150 TABLET ORAL ONCE
Qty: 1 TABLET | Refills: 3 | Status: SHIPPED | OUTPATIENT
Start: 2020-01-28 | End: 2020-01-28

## 2020-02-03 ENCOUNTER — ANNUAL EXAM (OUTPATIENT)
Dept: OBGYN CLINIC | Facility: CLINIC | Age: 45
End: 2020-02-03
Payer: COMMERCIAL

## 2020-02-03 VITALS
DIASTOLIC BLOOD PRESSURE: 78 MMHG | SYSTOLIC BLOOD PRESSURE: 118 MMHG | HEIGHT: 66 IN | BODY MASS INDEX: 30.53 KG/M2 | WEIGHT: 190 LBS

## 2020-02-03 DIAGNOSIS — R10.2 PELVIC PAIN: Primary | ICD-10-CM

## 2020-02-03 DIAGNOSIS — B37.9 CANDIDIASIS: ICD-10-CM

## 2020-02-03 DIAGNOSIS — Z11.3 SCREENING FOR STDS (SEXUALLY TRANSMITTED DISEASES): ICD-10-CM

## 2020-02-03 PROCEDURE — 87661 TRICHOMONAS VAGINALIS AMPLIF: CPT | Performed by: NURSE PRACTITIONER

## 2020-02-03 PROCEDURE — 87491 CHLMYD TRACH DNA AMP PROBE: CPT | Performed by: NURSE PRACTITIONER

## 2020-02-03 PROCEDURE — 3008F BODY MASS INDEX DOCD: CPT | Performed by: NURSE PRACTITIONER

## 2020-02-03 PROCEDURE — 1036F TOBACCO NON-USER: CPT | Performed by: NURSE PRACTITIONER

## 2020-02-03 PROCEDURE — 87591 N.GONORRHOEAE DNA AMP PROB: CPT | Performed by: NURSE PRACTITIONER

## 2020-02-03 PROCEDURE — 99213 OFFICE O/P EST LOW 20 MIN: CPT | Performed by: NURSE PRACTITIONER

## 2020-02-03 NOTE — PATIENT INSTRUCTIONS
Vulvovaginal Candidiasis   WHAT YOU NEED TO KNOW:   What is vulvovaginal candidiasis? Vulvovaginal candidiasis, or yeast infection, is a common vaginal infection  Vulvovaginal candidiasis is caused by a fungus, or yeast-like germ  Fungi are normally found in your vagina  When there are too many fungi, it can cause an infection  What increases my risk for vulvovaginal candidiasis? · Pregnancy    · Medical conditions that suppress your immune system, such as diabetes or HIV and AIDS    · Medicines, such as antibiotics, birth control pills, or steroid medicine    · Contraceptive devices, such as diaphragms, sponges, and intrauterine devices  What are the signs and symptoms of vulvovaginal candidiasis? · Thick, white, cheese-like discharge from your vagina    · Itching, swelling, or redness in your vagina    · Burning when you urinate  How is vulvovaginal candidiasis diagnosed? Your healthcare provider will ask about your medical history and examine you  A sample of your vaginal discharge may show what germ is causing your infection  How is vulvovaginal candidiasis treated? Medicines help treat the fungal infection and decrease inflammation  The medicine may be a pill, topical cream, or vaginal suppository  With treatment, the infection is usually gone within a week: How can I manage my symptoms? · Wear cotton underwear  · Keep the vaginal area clean and dry  · Wipe from front to back after you urinate or have a bowel movement  · Do not have sex until your symptoms are gone  · Do not douche  · Do not use strong perfumes or soaps  · Do not use feminine hygiene sprays, powders, or bubble bath  How can I prevent another infection? · Take showers instead of baths  · Eat yogurt  · Limit the amount of alcohol you drink  · Limit your time in hot tubs  · Control your blood sugar if you are diabetic  When should I seek immediate care? · You have fever and chills      · You are bleeding from your vagina and it is not your monthly period  · You develop abdominal or pelvic pain  When should I contact my healthcare provider? · Your signs and symptoms get worse, even after treatment  · You have questions or concerns about your condition or care  CARE AGREEMENT:   You have the right to help plan your care  Learn about your health condition and how it may be treated  Discuss treatment options with your caregivers to decide what care you want to receive  You always have the right to refuse treatment  The above information is an  only  It is not intended as medical advice for individual conditions or treatments  Talk to your doctor, nurse or pharmacist before following any medical regimen to see if it is safe and effective for you  © 2017 [facility] Information is for End User's use only and may not be sold, redistributed or otherwise used for commercial purposes  All illustrations and images included in CareNotes® are the copyrighted property of A D A M , Inc  or Melvin Tomlinson

## 2020-02-03 NOTE — PROGRESS NOTES
Diagnoses and all orders for this visit:    Pelvic pain  -     US pelvis complete w transvaginal; Future    Screening for STDs (sexually transmitted diseases)  -     Chlamydia/GC amplified DNA by PCR  -     Trichomonas Vaginalis, EDSON    Candidiasis  -     terconazole (TERAZOL 7) 0 4 % vaginal cream; Insert 1 applicator into the vagina daily at bedtime        Call if no symptom improvement, all questions answered, return for annual         Pleasant 38 y o  here for vaginal complaints of irritation and pelvic pain/cramping last week after sex  She also states she bled twice postcoitally  She had been on abx for a UTI  She has a history of hysterectomy  She used 2 diflucan without relief after her macrobid which she just completed  Denies douching  Denies fever  Denies new sexual partners but requests STD testing        Past Medical History:   Diagnosis Date    Fibroid     [facility]reditary spherocytosis ([facility] [address] )      Past Surgical History:   Procedure Laterality Date    HYSTERECTOMY N/A     SPLENECTOMY      WISDOM TOOTH EXTRACTION       Social History     Tobacco Use    Smoking status: Never Smoker    Smokeless tobacco: Never Used   Substance Use Topics    Alcohol use: Yes     Frequency: Monthly or less    Drug use: No     Family History   Problem Relation Age of Onset    Arthritis Mother     Hypertension Family     Breast cancer Neg Hx     Colon cancer Neg Hx     Ovarian cancer Neg Hx     Uterine cancer Neg Hx     Cervical cancer Neg Hx        Current Outpatient Medications:     Biotin 1000 MCG tablet, Take 1,000 mcg by mouth daily, Disp: , Rfl:     Cholecalciferol (VITAMIN D3) 5000 units CAPS, Take by mouth, Disp: , Rfl:     EVENING PRIMROSE OIL PO, Take by mouth, Disp: , Rfl:     fluticasone (FLONASE) 50 mcg/act nasal spray, 2 sprays into each nostril daily, Disp: 16 g, Rfl: 3    FOLIC ACID PO, Take by mouth, Disp: , Rfl:     hydrocortisone 2 5 % cream, Apply topically 2 (two) times a day Applied to face till rash resolved, Disp: 20 g, Rfl: 3    methocarbamol (ROBAXIN) 750 mg tablet, TAKE 1 TABLET (750 MG TOTAL) BY MOUTH EVERY 6 (SIX) HOURS AS NEEDED FOR MUSCLE SPASMS, Disp: 90 tablet, Rfl: 0    multivitamin (THERAGRAN) TABS, Take 1 tablet by mouth daily, Disp: , Rfl:     spironolactone (ALDACTONE) 50 mg tablet, Take 50 mg by mouth daily, Disp: , Rfl:     omeprazole (PriLOSEC) 40 MG capsule, Take 1 capsule (40 mg total) by mouth daily (Patient not taking: Reported on 2/3/2020), Disp: 14 capsule, Rfl: 0    terconazole (TERAZOL 7) 0 4 % vaginal cream, Insert 1 applicator into the vagina daily at bedtime, Disp: 45 g, Rfl: 1    Allergies   Allergen Reactions    Latex Hives and Rash     Contact-GLOVES    Shellfish Allergy Hives, Rash and Throat Swelling     OB History    Para Term  AB Living   5 2 2   3     SAB TAB Ectopic Multiple Live Births   3       2      # Outcome Date GA Lbr Moiz/2nd Weight Sex Delivery Anes PTL Lv   5 SAB            4 SAB            3 SAB            2 Term            1 Term                Vitals:    20 1537   BP: 118/78   BP Location: Right arm   Patient Position: Sitting   Weight: 86 2 kg (190 lb)   Height: 5' 6" (1 676 m)     Body mass index is 30 67 kg/m²  Review of Systems   Constitutional: Negative for chills, fatigue, fever and unexpected weight change  Respiratory: Negative for shortness of breath  Gastrointestinal: Negative for anal bleeding, blood in stool, constipation and diarrhea  Genitourinary: Negative for difficulty urinating, dysuria and hematuria  Physical Exam   Constitutional: She appears well-developed and well-nourished  No distress  Alert and oriented  HENT: atraumatic  Head: Normocephalic  Neck: Normal range of motion  Neck supple  Pulmonary: Effort normal   Abdominal: Soft  Pelvic exam was performed with patient supine  No labial fusion  There is no rash, tenderness, lesion or injury on the right labia   There is no rash, tenderness, lesion or injury on the left labia  Urethral meatus does not show any tenderness, inflammation or discharge  Palpation of midline bladder without pain or discomfort  Uterus is not deviated, not enlarged, not fixed and not tender  Cervix exhibits no motion tenderness, no discharge and no friability  Right adnexum displays no mass, no tenderness and no fullness  Left adnexum displays no mass, no tenderness and no fullness  No erythema or tenderness in the vagina  No foreign body in the vagina  No signs of injury around the vagina  Moderate yeast-like vaginal discharge found  Perineum and anus without areas of injury  No lesions noted or swelling  Lymphadenopathy:        Right: No inguinal adenopathy present  Left: No inguinal adenopathy present

## 2020-02-04 LAB
C TRACH DNA SPEC QL NAA+PROBE: NEGATIVE
N GONORRHOEA DNA SPEC QL NAA+PROBE: NEGATIVE

## 2020-02-06 LAB — T VAGINALIS RRNA SPEC QL NAA+PROBE: NEGATIVE

## 2020-02-11 ENCOUNTER — HOSPITAL ENCOUNTER (OUTPATIENT)
Dept: ULTRASOUND IMAGING | Facility: HOSPITAL | Age: 45
Discharge: HOME | End: 2020-02-11
Payer: COMMERCIAL

## 2020-02-11 DIAGNOSIS — R10.2 PELVIC PAIN: ICD-10-CM

## 2020-02-11 PROCEDURE — 76856 US EXAM PELVIC COMPLETE: CPT

## 2020-02-11 PROCEDURE — 76830 TRANSVAGINAL US NON-OB: CPT

## 2020-02-14 ENCOUNTER — TELEPHONE (OUTPATIENT)
Dept: OBGYN CLINIC | Facility: CLINIC | Age: 45
End: 2020-02-14

## 2020-02-14 DIAGNOSIS — B37.9 CANDIDIASIS: Primary | ICD-10-CM

## 2020-02-14 RX ORDER — FLUCONAZOLE 150 MG/1
150 TABLET ORAL ONCE
Qty: 3 TABLET | Refills: 0 | Status: SHIPPED | OUTPATIENT
Start: 2020-02-14 | End: 2020-02-14

## 2020-02-14 NOTE — TELEPHONE ENCOUNTER
----- Message from Keyla Erwin, [address] Metz sent at 2/14/2020 11:07 AM EST -----  Please notify patient her Left ovary looked completely normal on ultrasound  If pain continues she should see GI for consult  Thanks

## 2020-02-17 NOTE — TELEPHONE ENCOUNTER
Pt contacted # [phone] vm- per hippa communication consent on file- lmom advising as directed   With call bk number should pt have questions or concerns

## 2020-03-02 RX ORDER — SPIRONOLACTONE 50 MG/1
50 TABLET, FILM COATED ORAL DAILY
OUTPATIENT
Start: 2020-03-02

## 2020-03-18 ENCOUNTER — TELEPHONE (OUTPATIENT)
Dept: FAMILY MEDICINE CLINIC | Facility: CLINIC | Age: 45
End: 2020-03-18

## 2020-03-18 NOTE — TELEPHONE ENCOUNTER
Yes, I think it is appropriate for her to wear a mask  If she develops any symptoms-- let me know  Note in chart

## 2020-03-18 NOTE — TELEPHONE ENCOUNTER
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 If so needs a work note

## 2020-03-27 ENCOUNTER — OFFICE VISIT (OUTPATIENT)
Dept: FAMILY MEDICINE CLINIC | Facility: CLINIC | Age: 45
End: 2020-03-27
Payer: COMMERCIAL

## 2020-03-27 VITALS
TEMPERATURE: 98 F | HEIGHT: 66 IN | RESPIRATION RATE: 18 BRPM | WEIGHT: 198 LBS | SYSTOLIC BLOOD PRESSURE: 114 MMHG | HEART RATE: 86 BPM | BODY MASS INDEX: 31.82 KG/M2 | DIASTOLIC BLOOD PRESSURE: 72 MMHG | OXYGEN SATURATION: 99 %

## 2020-03-27 DIAGNOSIS — Z12.39 SCREENING FOR MALIGNANT NEOPLASM OF BREAST: ICD-10-CM

## 2020-03-27 DIAGNOSIS — Z00.00 ANNUAL PHYSICAL EXAM: Primary | ICD-10-CM

## 2020-03-27 PROBLEM — Z90.710 H/O: HYSTERECTOMY: Status: ACTIVE | Noted: 2020-03-27

## 2020-03-27 PROCEDURE — 99396 PREV VISIT EST AGE 40-64: CPT | Performed by: NURSE PRACTITIONER

## 2020-03-27 RX ORDER — PHENTERMINE HYDROCHLORIDE 15 MG/1
15 CAPSULE ORAL EVERY MORNING
Qty: 30 CAPSULE | Refills: 0 | Status: SHIPPED | OUTPATIENT
Start: 2020-03-27 | End: 2020-04-23 | Stop reason: SDUPTHER

## 2020-03-27 NOTE — PROGRESS NOTES
Here for annual physical  Patient feels like she is at a standstill with her weight and would like to start on phentermine  She has no cardiac history  She just lost her step mom yesterday and is having a lot of anxiety  Denies anxiety medications at this time  [facility] [address]    NAME: Ivelisse Delarosa  AGE: 38 y o  SEX: female  : 1975     DATE: 3/27/2020     Assessment and Plan:     Problem List Items Addressed This Visit     None      Visit Diagnoses     Annual physical exam    -  Primary    Relevant Orders    Lipid panel    CBC and Platelet    Comprehensive metabolic panel    TSH, 3rd generation with Free T4 reflex    Hemoglobin A1C    BMI 31 0-31 9,adult        Will start on phentermine daily  Increase fluids and keep calories under 1500  Relevant Medications    phentermine 15 MG capsule    Screening for malignant neoplasm of breast              Immunizations and preventive care screenings were discussed with patient today  Appropriate education was printed on patient's after visit summary  Counseling:  · Exercise: the importance of regular exercise/physical activity was discussed  Recommend exercise 3-5 times per week for at least 30 minutes  BMI Counseling: Body mass index is 31 96 kg/m²  The BMI is above normal  Nutrition recommendations include decreasing portion sizes, consuming healthier snacks and reducing intake of cholesterol  Exercise recommendations include moderate physical activity 150 minutes/week and exercising 3-5 times per week  No pharmacotherapy was ordered  Return in about 4 weeks (around 2020)  Chief Complaint:     Chief Complaint   Patient presents with    Physical Exam      History of Present Illness:     Adult Annual Physical   Patient here for a comprehensive physical exam  The patient reports problems - weight gain      Diet and Physical Activity  · Diet/Nutrition: well balanced diet  · Exercise: no formal exercise  Depression Screening  PHQ-9 Depression Screening    PHQ-9:    Frequency of the following problems over the past two weeks:       Little interest or pleasure in doing things:  1 - several days  Feeling down, depressed, or hopeless:  0 - not at all  PHQ-2 Score:  1       General Health  · Sleep: sleeps well  · Hearing: limited hearing both sides-- followed with ENT  · Vision: goes for regular eye exams and wears glasses  · Dental: regular dental visits  /GYN Health  · Patient is: hysterectomy   · Last menstrual period: 3 years ago  · Contraceptive method: hysterectomy  Review of Systems:     Review of Systems   Constitutional: Positive for fatigue  Negative for chills and fever  Respiratory: Negative  Cardiovascular: Negative for chest pain and palpitations  Gastrointestinal: Negative  Genitourinary: Negative  Psychiatric/Behavioral: Negative for dysphoric mood and sleep disturbance  The patient is nervous/anxious         Past Medical History:     Past Medical History:   Diagnosis Date    Fibroid     Hereditary spherocytosis ([facility] [address] )       Past Surgical History:     Past Surgical History:   Procedure Laterality Date    HYSTERECTOMY N/A     SPLENECTOMY      WISDOM TOOTH EXTRACTION        Social History:        Social History     Socioeconomic History    Marital status: /Civil Union     Spouse name: None    Number of children: None    Years of education: None    Highest education level: None   Occupational History    None   Social Needs    Financial resource strain: None    Food insecurity:     Worry: None     Inability: None    Transportation needs:     Medical: None     Non-medical: None   Tobacco Use    Smoking status: Never Smoker    Smokeless tobacco: Never Used   Substance and Sexual Activity    Alcohol use: Yes     Frequency: Monthly or less    Drug use: No    Sexual activity: Yes     Partners: Male     Birth control/protection: Surgical   Lifestyle    Physical activity:     Days per week: None     Minutes per session: None    Stress: None   Relationships    Social connections:     Talks on phone: None     Gets together: None     Attends Zoroastrian service: None     Active member of club or organization: None     Attends meetings of clubs or organizations: None     Relationship status: None    Intimate partner violence:     Fear of current or ex partner: None     Emotionally abused: None     Physically abused: None     Forced sexual activity: None   Other Topics Concern    None   Social History Narrative    None      Family History:     Family History   Problem Relation Age of Onset    Arthritis Mother     Hypertension Family     Breast cancer Neg Hx     Colon cancer Neg Hx     Ovarian cancer Neg Hx     Uterine cancer Neg Hx     Cervical cancer Neg Hx       Current Medications:     Current Outpatient Medications   Medication Sig Dispense Refill    Biotin 1000 MCG tablet Take 1,000 mcg by mouth daily      Cholecalciferol (VITAMIN D3) 5000 units CAPS Take by mouth      EVENING PRIMROSE OIL PO Take by mouth      fluticasone (FLONASE) 50 mcg/act nasal spray 2 sprays into each nostril daily 16 g 3    FOLIC ACID PO Take by mouth      hydrocortisone 2 5 % cream Apply topically 2 (two) times a day Applied to face till rash resolved 20 g 3    methocarbamol (ROBAXIN) 750 mg tablet TAKE 1 TABLET (750 MG TOTAL) BY MOUTH EVERY 6 (SIX) HOURS AS NEEDED FOR MUSCLE SPASMS 90 tablet 0    multivitamin (THERAGRAN) TABS Take 1 tablet by mouth daily      spironolactone (ALDACTONE) 50 mg tablet Take 50 mg by mouth daily      terconazole (TERAZOL 7) 0 4 % vaginal cream Insert 1 applicator into the vagina daily at bedtime 45 g 1    phentermine 15 MG capsule Take 1 capsule (15 mg total) by mouth every morning 30 capsule 0     No current facility-administered medications for this visit  Allergies: Allergies   Allergen Reactions    Latex Hives and Rash     Contact-GLOVES    Shellfish Allergy Hives, Rash and Throat Swelling      Physical Exam:     /72   Pulse 86   Temp 98 °F (36 7 °C) (Tympanic)   Resp 18   Ht 5' 6" (1 676 m)   Wt 89 8 kg (198 lb)   SpO2 99%   BMI 31 96 kg/m²     Physical Exam   Constitutional: She is oriented to person, place, and time  She appears well-developed and well-nourished  HENT:   Head: Normocephalic  Right Ear: Tympanic membrane normal    Left Ear: Tympanic membrane normal    Mouth/Throat: Oropharynx is clear and moist  No oropharyngeal exudate  Eyes: Pupils are equal, round, and reactive to light  Conjunctivae and EOM are normal  Right eye exhibits no discharge  Left eye exhibits no discharge  Neck: Normal range of motion  Neck supple  Cardiovascular: Normal rate, regular rhythm and normal heart sounds  Pulmonary/Chest: Effort normal and breath sounds normal  No respiratory distress  She has no wheezes  Musculoskeletal: Normal range of motion  Lymphadenopathy:     She has no cervical adenopathy  Neurological: She is alert and oriented to person, place, and time  Skin: Skin is warm and dry  Psychiatric: She has a normal mood and affect  Vitals reviewed        Chaz Rose, [address] [facility]

## 2020-04-24 RX ORDER — PHENTERMINE HYDROCHLORIDE 30 MG/1
30 CAPSULE ORAL EVERY MORNING
Qty: 30 CAPSULE | Refills: 0 | Status: SHIPPED | OUTPATIENT
Start: 2020-04-24 | End: 2020-05-27 | Stop reason: SDUPTHER

## 2020-05-04 DIAGNOSIS — Z20.828 EXPOSURE TO SARS-ASSOCIATED CORONAVIRUS: ICD-10-CM

## 2020-05-04 DIAGNOSIS — Z20.828 EXPOSURE TO SARS-ASSOCIATED CORONAVIRUS: Primary | ICD-10-CM

## 2020-05-04 PROCEDURE — U0003 INFECTIOUS AGENT DETECTION BY NUCLEIC ACID (DNA OR RNA); SEVERE ACUTE RESPIRATORY SYNDROME CORONAVIRUS 2 (SARS-COV-2) (CORONAVIRUS DISEASE [COVID-19]), AMPLIFIED PROBE TECHNIQUE, MAKING USE OF HIGH THROUGHPUT TECHNOLOGIES AS DESCRIBED BY CMS-2020-01-R: HCPCS

## 2020-05-05 LAB
INPATIENT: NORMAL
SARS-COV-2 RNA SPEC QL NAA+PROBE: NOT DETECTED

## 2020-05-20 ENCOUNTER — HOSPITAL ENCOUNTER (OUTPATIENT)
Dept: CT IMAGING | Facility: HOSPITAL | Age: 45
Discharge: HOME | End: 2020-05-20
Payer: COMMERCIAL

## 2020-05-20 ENCOUNTER — HOSPITAL ENCOUNTER (OUTPATIENT)
Dept: RADIOLOGY | Facility: HOSPITAL | Age: 45
Discharge: HOME | End: 2020-05-20
Payer: COMMERCIAL

## 2020-05-20 DIAGNOSIS — S49.91XA SHOULDER INJURY, RIGHT, INITIAL ENCOUNTER: ICD-10-CM

## 2020-05-20 DIAGNOSIS — S49.91XA SHOULDER INJURY, RIGHT, INITIAL ENCOUNTER: Primary | ICD-10-CM

## 2020-05-20 DIAGNOSIS — S09.90XA INJURY OF HEAD, INITIAL ENCOUNTER: Primary | ICD-10-CM

## 2020-05-20 DIAGNOSIS — S09.90XA INJURY OF HEAD, INITIAL ENCOUNTER: ICD-10-CM

## 2020-05-20 PROCEDURE — 70450 CT HEAD/BRAIN W/O DYE: CPT

## 2020-05-20 PROCEDURE — 73030 X-RAY EXAM OF SHOULDER: CPT

## 2020-05-20 RX ORDER — OXYCODONE HYDROCHLORIDE AND ACETAMINOPHEN 5; 325 MG/1; MG/1
1 TABLET ORAL EVERY 6 HOURS PRN
Qty: 20 TABLET | Refills: 0 | Status: SHIPPED | OUTPATIENT
Start: 2020-05-20 | End: 2020-09-11 | Stop reason: ALTCHOICE

## 2020-05-27 RX ORDER — PHENTERMINE HYDROCHLORIDE 30 MG/1
30 CAPSULE ORAL EVERY MORNING
Qty: 30 CAPSULE | Refills: 0 | Status: SHIPPED | OUTPATIENT
Start: 2020-05-27 | End: 2020-07-02 | Stop reason: SDUPTHER

## 2020-06-03 DIAGNOSIS — S49.91XA SHOULDER INJURY, RIGHT, INITIAL ENCOUNTER: Primary | ICD-10-CM

## 2020-06-17 DIAGNOSIS — T14.8XXA MUSCLE STRAIN: Primary | ICD-10-CM

## 2020-06-17 RX ORDER — CYCLOBENZAPRINE HCL 5 MG
5 TABLET ORAL 3 TIMES DAILY PRN
Qty: 30 TABLET | Refills: 0 | Status: SHIPPED | OUTPATIENT
Start: 2020-06-17 | End: 2020-07-01 | Stop reason: SDUPTHER

## 2020-06-17 RX ORDER — METHYLPREDNISOLONE 4 MG/1
TABLET ORAL
Qty: 21 EACH | Refills: 0 | Status: SHIPPED | OUTPATIENT
Start: 2020-06-17 | End: 2020-09-11 | Stop reason: ALTCHOICE

## 2020-06-29 ENCOUNTER — EVALUATION (OUTPATIENT)
Dept: PHYSICAL THERAPY | Facility: CLINIC | Age: 45
End: 2020-06-29
Payer: COMMERCIAL

## 2020-06-29 VITALS — DIASTOLIC BLOOD PRESSURE: 72 MMHG | HEART RATE: 93 BPM | SYSTOLIC BLOOD PRESSURE: 103 MMHG

## 2020-06-29 DIAGNOSIS — M25.512 ACUTE PAIN OF BOTH SHOULDERS: Primary | ICD-10-CM

## 2020-06-29 DIAGNOSIS — M25.511 ACUTE PAIN OF BOTH SHOULDERS: Primary | ICD-10-CM

## 2020-06-29 DIAGNOSIS — S06.0X9D CONCUSSION WITH LOSS OF CONSCIOUSNESS, SUBSEQUENT ENCOUNTER: ICD-10-CM

## 2020-06-29 DIAGNOSIS — M54.2 NECK PAIN: ICD-10-CM

## 2020-06-29 PROCEDURE — 97163 PT EVAL HIGH COMPLEX 45 MIN: CPT | Performed by: PHYSICAL THERAPIST

## 2020-06-30 DIAGNOSIS — T14.90XA TRAUMATIC INJURY: Primary | ICD-10-CM

## 2020-07-01 ENCOUNTER — APPOINTMENT (OUTPATIENT)
Dept: RADIOLOGY | Facility: CLINIC | Age: 45
End: 2020-07-01
Payer: COMMERCIAL

## 2020-07-01 ENCOUNTER — OFFICE VISIT (OUTPATIENT)
Dept: PHYSICAL THERAPY | Facility: CLINIC | Age: 45
End: 2020-07-01
Payer: COMMERCIAL

## 2020-07-01 ENCOUNTER — TRANSCRIBE ORDERS (OUTPATIENT)
Dept: URGENT CARE | Facility: CLINIC | Age: 45
End: 2020-07-01

## 2020-07-01 DIAGNOSIS — S06.0X9D CONCUSSION WITH LOSS OF CONSCIOUSNESS, SUBSEQUENT ENCOUNTER: ICD-10-CM

## 2020-07-01 DIAGNOSIS — M25.512 ACUTE PAIN OF BOTH SHOULDERS: Primary | ICD-10-CM

## 2020-07-01 DIAGNOSIS — M25.511 ACUTE PAIN OF BOTH SHOULDERS: Primary | ICD-10-CM

## 2020-07-01 DIAGNOSIS — T14.90XA TRAUMATIC INJURY: Primary | ICD-10-CM

## 2020-07-01 DIAGNOSIS — M54.2 NECK PAIN: ICD-10-CM

## 2020-07-01 DIAGNOSIS — T14.8XXA MUSCLE STRAIN: ICD-10-CM

## 2020-07-01 DIAGNOSIS — T14.90XA TRAUMATIC INJURY: ICD-10-CM

## 2020-07-01 PROCEDURE — 72040 X-RAY EXAM NECK SPINE 2-3 VW: CPT

## 2020-07-01 PROCEDURE — 97014 ELECTRIC STIMULATION THERAPY: CPT

## 2020-07-01 PROCEDURE — 71046 X-RAY EXAM CHEST 2 VIEWS: CPT

## 2020-07-01 PROCEDURE — 73030 X-RAY EXAM OF SHOULDER: CPT

## 2020-07-01 PROCEDURE — 97110 THERAPEUTIC EXERCISES: CPT

## 2020-07-01 PROCEDURE — 72072 X-RAY EXAM THORAC SPINE 3VWS: CPT

## 2020-07-01 RX ORDER — CYCLOBENZAPRINE HCL 5 MG
5 TABLET ORAL 3 TIMES DAILY PRN
Qty: 30 TABLET | Refills: 0 | Status: SHIPPED | OUTPATIENT
Start: 2020-07-01 | End: 2020-08-24
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
## 2020-07-06 RX ORDER — PHENTERMINE HYDROCHLORIDE 30 MG/1
30 CAPSULE ORAL EVERY MORNING
Qty: 30 CAPSULE | Refills: 0 | Status: SHIPPED | OUTPATIENT
Start: 2020-07-06 | End: 2020-08-04 | Stop reason: SDUPTHER

## 2020-07-08 ENCOUNTER — APPOINTMENT (OUTPATIENT)
Dept: PHYSICAL THERAPY | Facility: CLINIC | Age: 45
End: 2020-07-08
Payer: COMMERCIAL

## 2020-07-10 ENCOUNTER — OFFICE VISIT (OUTPATIENT)
Dept: OBGYN CLINIC | Facility: MEDICAL CENTER | Age: 45
End: 2020-07-10
Payer: COMMERCIAL

## 2020-07-10 VITALS
BODY MASS INDEX: 31.05 KG/M2 | HEART RATE: 79 BPM | WEIGHT: 193.2 LBS | SYSTOLIC BLOOD PRESSURE: 113 MMHG | DIASTOLIC BLOOD PRESSURE: 80 MMHG | HEIGHT: 66 IN

## 2020-07-10 DIAGNOSIS — M54.12 CERVICAL RADICULOPATHY DUE TO TRAUMA: ICD-10-CM

## 2020-07-10 DIAGNOSIS — S16.1XXA STRAIN OF NECK MUSCLE, INITIAL ENCOUNTER: Primary | ICD-10-CM

## 2020-07-10 DIAGNOSIS — M62.838 TRAPEZIUS MUSCLE SPASM: ICD-10-CM

## 2020-07-10 DIAGNOSIS — M62.838 CERVICAL PARASPINAL MUSCLE SPASM: ICD-10-CM

## 2020-07-10 PROCEDURE — 1036F TOBACCO NON-USER: CPT | Performed by: ORTHOPAEDIC SURGERY

## 2020-07-10 PROCEDURE — 3008F BODY MASS INDEX DOCD: CPT | Performed by: ORTHOPAEDIC SURGERY

## 2020-07-10 PROCEDURE — 99203 OFFICE O/P NEW LOW 30 MIN: CPT | Performed by: ORTHOPAEDIC SURGERY

## 2020-07-10 RX ORDER — GABAPENTIN 100 MG/1
100 CAPSULE ORAL 3 TIMES DAILY
Qty: 60 CAPSULE | Refills: 0 | Status: SHIPPED | OUTPATIENT
Start: 2020-07-10 | End: 2020-07-30
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
## 2020-07-13 ENCOUNTER — OFFICE VISIT (OUTPATIENT)
Dept: PHYSICAL THERAPY | Facility: CLINIC | Age: 45
End: 2020-07-13
Payer: COMMERCIAL

## 2020-07-13 DIAGNOSIS — M54.2 NECK PAIN: ICD-10-CM

## 2020-07-13 DIAGNOSIS — M25.512 ACUTE PAIN OF BOTH SHOULDERS: Primary | ICD-10-CM

## 2020-07-13 DIAGNOSIS — S06.0X9D CONCUSSION WITH LOSS OF CONSCIOUSNESS, SUBSEQUENT ENCOUNTER: ICD-10-CM

## 2020-07-13 DIAGNOSIS — M25.511 ACUTE PAIN OF BOTH SHOULDERS: Primary | ICD-10-CM

## 2020-07-13 PROCEDURE — 97014 ELECTRIC STIMULATION THERAPY: CPT | Performed by: PHYSICAL THERAPIST

## 2020-07-13 PROCEDURE — 97110 THERAPEUTIC EXERCISES: CPT | Performed by: PHYSICAL THERAPIST

## 2020-07-13 PROCEDURE — 97010 HOT OR COLD PACKS THERAPY: CPT | Performed by: PHYSICAL THERAPIST
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
## 2020-07-14 ENCOUNTER — APPOINTMENT (OUTPATIENT)
Dept: PHYSICAL THERAPY | Facility: CLINIC | Age: 45
End: 2020-07-14
Payer: COMMERCIAL

## 2020-07-15 ENCOUNTER — OFFICE VISIT (OUTPATIENT)
Dept: PHYSICAL THERAPY | Facility: CLINIC | Age: 45
End: 2020-07-15
Payer: COMMERCIAL

## 2020-07-15 DIAGNOSIS — M54.2 NECK PAIN: ICD-10-CM

## 2020-07-15 DIAGNOSIS — M25.511 ACUTE PAIN OF BOTH SHOULDERS: Primary | ICD-10-CM

## 2020-07-15 DIAGNOSIS — S06.0X9D CONCUSSION WITH LOSS OF CONSCIOUSNESS, SUBSEQUENT ENCOUNTER: ICD-10-CM

## 2020-07-15 DIAGNOSIS — M25.512 ACUTE PAIN OF BOTH SHOULDERS: Primary | ICD-10-CM

## 2020-07-15 PROCEDURE — 97014 ELECTRIC STIMULATION THERAPY: CPT
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
## 2020-07-16 ENCOUNTER — APPOINTMENT (OUTPATIENT)
Dept: PHYSICAL THERAPY | Facility: CLINIC | Age: 45
End: 2020-07-16
Payer: COMMERCIAL

## 2020-07-22 ENCOUNTER — OFFICE VISIT (OUTPATIENT)
Dept: PHYSICAL THERAPY | Facility: CLINIC | Age: 45
End: 2020-07-22
Payer: COMMERCIAL

## 2020-07-22 DIAGNOSIS — M25.511 ACUTE PAIN OF BOTH SHOULDERS: Primary | ICD-10-CM

## 2020-07-22 DIAGNOSIS — S06.0X9D CONCUSSION WITH LOSS OF CONSCIOUSNESS, SUBSEQUENT ENCOUNTER: ICD-10-CM

## 2020-07-22 DIAGNOSIS — M25.512 ACUTE PAIN OF BOTH SHOULDERS: Primary | ICD-10-CM

## 2020-07-22 DIAGNOSIS — M54.2 NECK PAIN: ICD-10-CM

## 2020-07-22 PROCEDURE — 97110 THERAPEUTIC EXERCISES: CPT

## 2020-07-22 PROCEDURE — 97014 ELECTRIC STIMULATION THERAPY: CPT
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
## 2020-07-29 ENCOUNTER — APPOINTMENT (OUTPATIENT)
Dept: PHYSICAL THERAPY | Facility: CLINIC | Age: 45
End: 2020-07-29
Payer: COMMERCIAL

## 2020-07-30 ENCOUNTER — OFFICE VISIT (OUTPATIENT)
Dept: PHYSICAL THERAPY | Facility: CLINIC | Age: 45
End: 2020-07-30
Payer: COMMERCIAL

## 2020-07-30 DIAGNOSIS — M25.512 ACUTE PAIN OF BOTH SHOULDERS: Primary | ICD-10-CM

## 2020-07-30 DIAGNOSIS — M25.511 ACUTE PAIN OF BOTH SHOULDERS: Primary | ICD-10-CM

## 2020-07-30 DIAGNOSIS — S06.0X9D CONCUSSION WITH LOSS OF CONSCIOUSNESS, SUBSEQUENT ENCOUNTER: ICD-10-CM

## 2020-07-30 DIAGNOSIS — M54.2 NECK PAIN: ICD-10-CM

## 2020-07-30 DIAGNOSIS — M54.12 CERVICAL RADICULOPATHY DUE TO TRAUMA: ICD-10-CM

## 2020-07-30 PROCEDURE — 97014 ELECTRIC STIMULATION THERAPY: CPT

## 2020-07-30 PROCEDURE — 97110 THERAPEUTIC EXERCISES: CPT

## 2020-07-30 RX ORDER — GABAPENTIN 100 MG/1
CAPSULE ORAL
Qty: 60 CAPSULE | Refills: 0 | Status: SHIPPED | OUTPATIENT
Start: 2020-07-30 | End: 2020-09-11 | Stop reason: ALTCHOICE
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
## 2020-08-03 DIAGNOSIS — T14.8XXA MUSCLE STRAIN: Primary | ICD-10-CM

## 2020-08-03 RX ORDER — PEN NEEDLE, DIABETIC 32GX 5/32"
1 NEEDLE, DISPOSABLE MISCELLANEOUS DAILY
Qty: 1 DEVICE | Refills: 0 | Status: SHIPPED | OUTPATIENT
Start: 2020-08-03 | End: 2020-08-04 | Stop reason: SDUPTHER

## 2020-08-03 RX ORDER — PEN NEEDLE, DIABETIC 32GX 5/32"
1 NEEDLE, DISPOSABLE MISCELLANEOUS DAILY
Qty: 1 DEVICE | Refills: 0 | Status: SHIPPED | OUTPATIENT
Start: 2020-08-03 | End: 2020-08-03 | Stop reason: SDUPTHER

## 2020-08-04 DIAGNOSIS — T14.8XXA MUSCLE STRAIN: ICD-10-CM

## 2020-08-04 RX ORDER — PHENTERMINE HYDROCHLORIDE 30 MG/1
30 CAPSULE ORAL EVERY MORNING
Qty: 30 CAPSULE | Refills: 0 | Status: SHIPPED | OUTPATIENT
Start: 2020-08-04 | End: 2020-09-11 | Stop reason: SDUPTHER

## 2020-08-04 RX ORDER — PEN NEEDLE, DIABETIC 32GX 5/32"
1 NEEDLE, DISPOSABLE MISCELLANEOUS DAILY
Qty: 1 DEVICE | Refills: 0 | Status: SHIPPED | OUTPATIENT
Start: 2020-08-04

## 2020-08-22 DIAGNOSIS — T14.8XXA MUSCLE STRAIN: ICD-10-CM

## 2020-08-24 RX ORDER — CYCLOBENZAPRINE HCL 5 MG
TABLET ORAL
Qty: 30 TABLET | Refills: 0 | Status: SHIPPED | OUTPATIENT
Start: 2020-08-24 | End: 2020-09-11 | Stop reason: ALTCHOICE

## 2020-09-08 RX ORDER — PHENTERMINE HYDROCHLORIDE 30 MG/1
CAPSULE ORAL
Qty: 30 CAPSULE | Refills: 0 | OUTPATIENT
Start: 2020-09-08

## 2020-09-11 ENCOUNTER — OFFICE VISIT (OUTPATIENT)
Dept: FAMILY MEDICINE CLINIC | Facility: CLINIC | Age: 45
End: 2020-09-11
Payer: COMMERCIAL

## 2020-09-11 VITALS
HEART RATE: 97 BPM | BODY MASS INDEX: 31.34 KG/M2 | OXYGEN SATURATION: 99 % | SYSTOLIC BLOOD PRESSURE: 120 MMHG | HEIGHT: 66 IN | WEIGHT: 195 LBS | TEMPERATURE: 97.9 F | DIASTOLIC BLOOD PRESSURE: 74 MMHG

## 2020-09-11 DIAGNOSIS — T14.8XXA MUSCLE STRAIN: ICD-10-CM

## 2020-09-11 DIAGNOSIS — Z12.39 SCREENING FOR MALIGNANT NEOPLASM OF BREAST: ICD-10-CM

## 2020-09-11 PROCEDURE — 99213 OFFICE O/P EST LOW 20 MIN: CPT | Performed by: NURSE PRACTITIONER

## 2020-09-11 RX ORDER — CYCLOBENZAPRINE HCL 5 MG
5 TABLET ORAL
Qty: 30 TABLET | Refills: 0 | Status: SHIPPED | OUTPATIENT
Start: 2020-09-11 | End: 2020-12-21 | Stop reason: SDUPTHER

## 2020-09-11 RX ORDER — PHENTERMINE HYDROCHLORIDE 30 MG/1
30 CAPSULE ORAL EVERY MORNING
Qty: 30 CAPSULE | Refills: 0 | Status: SHIPPED | OUTPATIENT
Start: 2020-09-11 | End: 2020-11-02 | Stop reason: ALTCHOICE

## 2020-09-11 RX ORDER — FLUCONAZOLE 150 MG/1
TABLET ORAL
COMMUNITY
Start: 2020-08-28 | End: 2020-11-02 | Stop reason: SDUPTHER

## 2020-09-11 NOTE — PROGRESS NOTES
Assessment/Plan:     Chronic Problems:  BMI 31 0-31 9,adult  Will give 2 more months to try the phentermine  Advised to increase fluids and decrease calories  Visit Diagnosis:  Diagnoses and all orders for this visit:    BMI 31 0-31 9,adult  -     phentermine 30 MG capsule; Take 1 capsule (30 mg total) by mouth every morning    Muscle strain  -     cyclobenzaprine (FLEXERIL) 5 mg tablet; Take 1 tablet (5 mg total) by mouth daily at bedtime    Screening for malignant neoplasm of breast  -     Mammo screening bilateral w 3d & cad; Future    Other orders  -     fluconazole (DIFLUCAN) 150 mg tablet; TAKE 1 TABLET BY MOUTH AS ONE DOSE          Subjective:    Patient ID: Spencer Toledo is a 38 y o  female  Patient presents for follow-up on phentermine  Patient has been using the medication for about 5 months and has only lost about 3 lb  She would like to try the medication for another 2 months to see how she does  She has not had any side effects with the medication  The following portions of the patient's history were reviewed and updated as appropriate: allergies, current medications, past family history, past medical history, past social history, past surgical history and problem list     Review of Systems   Constitutional: Negative for chills, fatigue and fever  HENT: Negative  Respiratory: Negative for chest tightness, shortness of breath and wheezing  Cardiovascular: Negative for chest pain and palpitations  Gastrointestinal: Negative for abdominal pain, constipation, diarrhea, nausea and vomiting  Genitourinary: Negative for difficulty urinating, flank pain, frequency, hematuria, pelvic pain and urgency  Musculoskeletal: Negative  Neurological: Negative for dizziness, light-headedness, numbness and headaches           /74   Pulse 97   Temp 97 9 °F (36 6 °C)   Ht 5' 6" (1 676 m)   Wt 88 5 kg (195 lb)   SpO2 99%   BMI 31 47 kg/m²   Social History     Socioeconomic History    Marital status: /Civil Union     Spouse name: Not on file    Number of children: Not on file    Years of education: Not on file    Highest education level: Not on file   Occupational History    Not on file   Social Needs    Financial resource strain: Not on file    Food insecurity     Worry: Not on file     Inability: Not on file    Transportation needs     Medical: Not on file     Non-medical: Not on file   Tobacco Use    Smoking status: Never Smoker    Smokeless tobacco: Never Used   Substance and Sexual Activity    Alcohol use: Yes     Frequency: Monthly or less    Drug use: No    Sexual activity: Yes     Partners: Male     Birth control/protection: Surgical   Lifestyle    Physical activity     Days per week: Not on file     Minutes per session: Not on file    Stress: Not on file   Relationships    Social connections     Talks on phone: Not on file     Gets together: Not on file     Attends [employer] service: Not on file     Active member of club or organization: Not on file     Attends meetings of clubs or organizations: Not on file     Relationship status: Not on file    Intimate partner violence     Fear of current or ex partner: Not on file     Emotionally abused: Not on file     Physically abused: Not on file     Forced sexual activity: Not on file   Other Topics Concern    Not on file   Social History Narrative    Not on file     Past Medical History:   Diagnosis Date    Fibroid     Head injury     Hereditary spherocytosis ([facility] [address] )      Family History   Problem Relation Age of Onset    Arthritis Mother     Hypertension Family     ADD / ADHD Son     Learning disabilities Son     Breast cancer Neg Hx     Colon cancer Neg Hx     Ovarian cancer Neg Hx     Uterine cancer Neg Hx     Cervical cancer Neg Hx      Past Surgical History:   Procedure Laterality Date    HYSTERECTOMY N/A     SPLENECTOMY      WISDOM TOOTH EXTRACTION         Current Outpatient Medications:   Biotin 1000 MCG tablet, Take 1,000 mcg by mouth daily, Disp: , Rfl:     Cholecalciferol (VITAMIN D3) 5000 units CAPS, Take by mouth, Disp: , Rfl:     cyclobenzaprine (FLEXERIL) 5 mg tablet, Take 1 tablet (5 mg total) by mouth daily at bedtime, Disp: 30 tablet, Rfl: 0    EVENING PRIMROSE OIL PO, Take by mouth, Disp: , Rfl:     fluconazole (DIFLUCAN) 150 mg tablet, TAKE 1 TABLET BY MOUTH AS ONE DOSE, Disp: , Rfl:     fluticasone (FLONASE) 50 mcg/act nasal spray, 2 sprays into each nostril daily, Disp: 16 g, Rfl: 3    FOLIC ACID PO, Take by mouth, Disp: , Rfl:     hydrocortisone 2 5 % cream, Apply topically 2 (two) times a day Applied to face till rash resolved, Disp: 20 g, Rfl: 3    multivitamin (THERAGRAN) TABS, Take 1 tablet by mouth daily, Disp: , Rfl:     Nerve Stimulator (Pro Comfort Tens Unit) JOSIAH, 1 Device by Does not apply route daily, Disp: 1 Device, Rfl: 0    phentermine 30 MG capsule, Take 1 capsule (30 mg total) by mouth every morning, Disp: 30 capsule, Rfl: 0    spironolactone (ALDACTONE) 50 mg tablet, Take 50 mg by mouth daily, Disp: , Rfl:     terconazole (TERAZOL 7) 0 4 % vaginal cream, Insert 1 applicator into the vagina daily at bedtime, Disp: 45 g, Rfl: 1    Allergies   Allergen Reactions    Latex Hives and Rash     Contact-GLOVES    Shellfish Allergy Hives, Rash and Throat Swelling          Lab Review   No visits with results within 2 Month(s) from this visit  Latest known visit with results is:   Orders Only on 05/04/2020   Component Date Value    SARS-CoV-2  05/04/2020 Not Detected     Inpatient 05/04/2020 Comment         Imaging: No results found  Objective:     Physical Exam  Constitutional:       Appearance: She is well-developed  Cardiovascular:      Rate and Rhythm: Normal rate and regular rhythm  Heart sounds: Normal heart sounds  No murmur  Pulmonary:      Effort: Pulmonary effort is normal  No respiratory distress        Breath sounds: Normal breath sounds  Skin:     General: Skin is warm and dry  Neurological:      Mental Status: She is alert and oriented to person, place, and time  There are no Patient Instructions on file for this visit  BRITTANY Looney    Portions of the record may have been created with voice recognition software  Occasional wrong word or "sound a like" substitutions may have occurred due to the inherent limitations of voice recognition software  Read the chart carefully and recognize, using context, where substitutions have occurred

## 2020-09-29 DIAGNOSIS — F41.9 ANXIETY: Primary | ICD-10-CM

## 2020-09-29 RX ORDER — BUSPIRONE HYDROCHLORIDE 5 MG/1
5 TABLET ORAL 2 TIMES DAILY
Qty: 60 TABLET | Refills: 0 | Status: SHIPPED | OUTPATIENT
Start: 2020-09-29 | End: 2020-11-23

## 2020-10-02 ENCOUNTER — APPOINTMENT (OUTPATIENT)
Dept: LAB | Facility: HOSPITAL | Age: 45
End: 2020-10-02
Payer: COMMERCIAL

## 2020-10-02 DIAGNOSIS — Z00.00 ANNUAL PHYSICAL EXAM: ICD-10-CM

## 2020-10-02 LAB
ALBUMIN SERPL BCP-MCNC: 3.4 G/DL (ref 3.5–5)
ALP SERPL-CCNC: 76 U/L (ref 46–116)
ALT SERPL W P-5'-P-CCNC: 15 U/L (ref 12–78)
ANION GAP SERPL CALCULATED.3IONS-SCNC: 9 MMOL/L (ref 4–13)
AST SERPL W P-5'-P-CCNC: 15 U/L (ref 5–45)
BILIRUB SERPL-MCNC: 0.7 MG/DL (ref 0.2–1)
BUN SERPL-MCNC: 11 MG/DL (ref 5–25)
CALCIUM ALBUM COR SERPL-MCNC: 9.6 MG/DL (ref 8.3–10.1)
CALCIUM SERPL-MCNC: 9.1 MG/DL (ref 8.3–10.1)
CHLORIDE SERPL-SCNC: 104 MMOL/L (ref 100–108)
CHOLEST SERPL-MCNC: 173 MG/DL (ref 50–200)
CO2 SERPL-SCNC: 26 MMOL/L (ref 21–32)
CREAT SERPL-MCNC: 0.85 MG/DL (ref 0.6–1.3)
ERYTHROCYTE [DISTWIDTH] IN BLOOD BY AUTOMATED COUNT: 12.5 % (ref 11.6–15.1)
EST. AVERAGE GLUCOSE BLD GHB EST-MCNC: 94 MG/DL
GFR SERPL CREATININE-BSD FRML MDRD: 97 ML/MIN/1.73SQ M
GLUCOSE P FAST SERPL-MCNC: 93 MG/DL (ref 65–99)
HBA1C MFR BLD: 4.9 %
HCT VFR BLD AUTO: 40.6 % (ref 34.8–46.1)
HDLC SERPL-MCNC: 63 MG/DL
HGB BLD-MCNC: 14.3 G/DL (ref 11.5–15.4)
LDLC SERPL CALC-MCNC: 94 MG/DL (ref 0–100)
MCH RBC QN AUTO: 32.3 PG (ref 26.8–34.3)
MCHC RBC AUTO-ENTMCNC: 35.2 G/DL (ref 31.4–37.4)
MCV RBC AUTO: 92 FL (ref 82–98)
NONHDLC SERPL-MCNC: 110 MG/DL
PLATELET # BLD AUTO: 611 THOUSANDS/UL (ref 149–390)
PMV BLD AUTO: 8.3 FL (ref 8.9–12.7)
POTASSIUM SERPL-SCNC: 4 MMOL/L (ref 3.5–5.3)
PROT SERPL-MCNC: 7.6 G/DL (ref 6.4–8.2)
RBC # BLD AUTO: 4.43 MILLION/UL (ref 3.81–5.12)
SODIUM SERPL-SCNC: 139 MMOL/L (ref 136–145)
TRIGL SERPL-MCNC: 81 MG/DL
TSH SERPL DL<=0.05 MIU/L-ACNC: 0.85 UIU/ML (ref 0.36–3.74)
WBC # BLD AUTO: 8.68 THOUSAND/UL (ref 4.31–10.16)

## 2020-10-02 PROCEDURE — 83036 HEMOGLOBIN GLYCOSYLATED A1C: CPT

## 2020-10-02 PROCEDURE — 80061 LIPID PANEL: CPT

## 2020-10-02 PROCEDURE — 36415 COLL VENOUS BLD VENIPUNCTURE: CPT

## 2020-10-02 PROCEDURE — 85027 COMPLETE CBC AUTOMATED: CPT

## 2020-10-02 PROCEDURE — 84443 ASSAY THYROID STIM HORMONE: CPT

## 2020-10-02 PROCEDURE — 80053 COMPREHEN METABOLIC PANEL: CPT

## 2020-10-07 ENCOUNTER — TELEPHONE (OUTPATIENT)
Dept: FAMILY MEDICINE CLINIC | Facility: CLINIC | Age: 45
End: 2020-10-07

## 2020-10-28 ENCOUNTER — HOSPITAL ENCOUNTER (OUTPATIENT)
Dept: MAMMOGRAPHY | Facility: CLINIC | Age: 45
Discharge: HOME | End: 2020-10-28
Payer: COMMERCIAL

## 2020-10-28 VITALS — HEIGHT: 66 IN | BODY MASS INDEX: 31.34 KG/M2 | WEIGHT: 195 LBS

## 2020-10-28 DIAGNOSIS — Z12.39 SCREENING FOR MALIGNANT NEOPLASM OF BREAST: ICD-10-CM

## 2020-10-28 PROCEDURE — 77063 BREAST TOMOSYNTHESIS BI: CPT

## 2020-10-28 PROCEDURE — 77067 SCR MAMMO BI INCL CAD: CPT

## 2020-11-02 ENCOUNTER — ANNUAL EXAM (OUTPATIENT)
Dept: OBGYN CLINIC | Facility: CLINIC | Age: 45
End: 2020-11-02
Payer: COMMERCIAL

## 2020-11-02 VITALS
BODY MASS INDEX: 31.7 KG/M2 | DIASTOLIC BLOOD PRESSURE: 84 MMHG | SYSTOLIC BLOOD PRESSURE: 122 MMHG | WEIGHT: 196.4 LBS | TEMPERATURE: 97.7 F

## 2020-11-02 DIAGNOSIS — Z90.710 H/O: HYSTERECTOMY: ICD-10-CM

## 2020-11-02 DIAGNOSIS — Z11.3 SCREENING FOR STD (SEXUALLY TRANSMITTED DISEASE): ICD-10-CM

## 2020-11-02 DIAGNOSIS — F41.9 ANXIETY: Primary | ICD-10-CM

## 2020-11-02 DIAGNOSIS — Z01.411 ENCOUNTER FOR GYNECOLOGICAL EXAMINATION WITH ABNORMAL FINDING: Primary | ICD-10-CM

## 2020-11-02 DIAGNOSIS — B37.9 CANDIDIASIS: ICD-10-CM

## 2020-11-02 DIAGNOSIS — R39.9 UTI SYMPTOMS: ICD-10-CM

## 2020-11-02 LAB
SL AMB POCT SPECIFIC GRAVITY,UA: POSITIVE
SL AMB POCT URINE PROTEIN: POSITIVE

## 2020-11-02 PROCEDURE — 87491 CHLMYD TRACH DNA AMP PROBE: CPT | Performed by: NURSE PRACTITIONER

## 2020-11-02 PROCEDURE — 87591 N.GONORRHOEAE DNA AMP PROB: CPT | Performed by: NURSE PRACTITIONER

## 2020-11-02 PROCEDURE — 81002 URINALYSIS NONAUTO W/O SCOPE: CPT | Performed by: NURSE PRACTITIONER

## 2020-11-02 PROCEDURE — 87086 URINE CULTURE/COLONY COUNT: CPT | Performed by: NURSE PRACTITIONER

## 2020-11-02 PROCEDURE — 99396 PREV VISIT EST AGE 40-64: CPT | Performed by: NURSE PRACTITIONER

## 2020-11-02 RX ORDER — FLUCONAZOLE 150 MG/1
150 TABLET ORAL ONCE
Qty: 2 TABLET | Refills: 1 | Status: SHIPPED | OUTPATIENT
Start: 2020-11-02 | End: 2020-11-02

## 2020-11-03 LAB — BACTERIA UR CULT: NORMAL

## 2020-11-04 ENCOUNTER — HOSPITAL ENCOUNTER (OUTPATIENT)
Dept: ULTRASOUND IMAGING | Facility: CLINIC | Age: 45
Discharge: HOME | End: 2020-11-04
Payer: COMMERCIAL

## 2020-11-04 ENCOUNTER — LAB (OUTPATIENT)
Dept: LAB | Facility: HOSPITAL | Age: 45
End: 2020-11-04
Payer: COMMERCIAL

## 2020-11-04 ENCOUNTER — HOSPITAL ENCOUNTER (OUTPATIENT)
Dept: MAMMOGRAPHY | Facility: CLINIC | Age: 45
Discharge: HOME | End: 2020-11-04
Payer: COMMERCIAL

## 2020-11-04 VITALS — HEIGHT: 66 IN | BODY MASS INDEX: 31.5 KG/M2 | WEIGHT: 196 LBS

## 2020-11-04 DIAGNOSIS — R92.8 ABNORMAL MAMMOGRAM: ICD-10-CM

## 2020-11-04 DIAGNOSIS — Z11.3 SCREENING FOR STD (SEXUALLY TRANSMITTED DISEASE): ICD-10-CM

## 2020-11-04 LAB
C TRACH DNA SPEC QL NAA+PROBE: NEGATIVE
HBV SURFACE AG SER QL: NORMAL
HCV AB SER QL: NORMAL
N GONORRHOEA DNA SPEC QL NAA+PROBE: NEGATIVE
RPR SER QL: NORMAL

## 2020-11-04 PROCEDURE — 87340 HEPATITIS B SURFACE AG IA: CPT

## 2020-11-04 PROCEDURE — 87389 HIV-1 AG W/HIV-1&-2 AB AG IA: CPT

## 2020-11-04 PROCEDURE — 36415 COLL VENOUS BLD VENIPUNCTURE: CPT

## 2020-11-04 PROCEDURE — G0279 TOMOSYNTHESIS, MAMMO: HCPCS

## 2020-11-04 PROCEDURE — 86803 HEPATITIS C AB TEST: CPT

## 2020-11-04 PROCEDURE — 86592 SYPHILIS TEST NON-TREP QUAL: CPT

## 2020-11-04 PROCEDURE — 77065 DX MAMMO INCL CAD UNI: CPT

## 2020-11-05 LAB — HIV 1+2 AB+HIV1 P24 AG SERPL QL IA: NORMAL

## 2020-11-22 DIAGNOSIS — F41.9 ANXIETY: ICD-10-CM

## 2020-11-23 RX ORDER — BUSPIRONE HYDROCHLORIDE 5 MG/1
TABLET ORAL
Qty: 60 TABLET | Refills: 0 | Status: SHIPPED | OUTPATIENT
Start: 2020-11-23 | End: 2020-12-22

## 2020-12-21 DIAGNOSIS — T14.8XXA MUSCLE STRAIN: ICD-10-CM

## 2020-12-21 DIAGNOSIS — F41.9 ANXIETY: ICD-10-CM

## 2020-12-21 RX ORDER — CYCLOBENZAPRINE HCL 5 MG
5 TABLET ORAL
Qty: 30 TABLET | Refills: 0 | Status: SHIPPED | OUTPATIENT
Start: 2020-12-21 | End: 2021-01-26 | Stop reason: SDUPTHER

## 2020-12-22 RX ORDER — BUSPIRONE HYDROCHLORIDE 5 MG/1
TABLET ORAL
Qty: 60 TABLET | Refills: 0 | Status: SHIPPED | OUTPATIENT
Start: 2020-12-22 | End: 2021-01-15

## 2021-01-13 DIAGNOSIS — N30.01 ACUTE CYSTITIS WITH HEMATURIA: Primary | ICD-10-CM

## 2021-01-13 RX ORDER — NITROFURANTOIN 25; 75 MG/1; MG/1
100 CAPSULE ORAL 2 TIMES DAILY
Qty: 10 CAPSULE | Refills: 0 | Status: SHIPPED | OUTPATIENT
Start: 2021-01-13 | End: 2021-01-18

## 2021-01-14 DIAGNOSIS — F41.9 ANXIETY: ICD-10-CM

## 2021-01-15 RX ORDER — BUSPIRONE HYDROCHLORIDE 5 MG/1
TABLET ORAL
Qty: 60 TABLET | Refills: 0 | Status: SHIPPED | OUTPATIENT
Start: 2021-01-15 | End: 2021-02-17

## 2021-01-18 DIAGNOSIS — Z23 ENCOUNTER FOR IMMUNIZATION: ICD-10-CM

## 2021-01-26 DIAGNOSIS — T14.8XXA MUSCLE STRAIN: ICD-10-CM

## 2021-01-26 DIAGNOSIS — B37.9 CANDIDIASIS: ICD-10-CM

## 2021-01-27 ENCOUNTER — TELEPHONE (OUTPATIENT)
Dept: OBGYN CLINIC | Facility: CLINIC | Age: 46
End: 2021-01-27

## 2021-01-27 ENCOUNTER — OFFICE VISIT (OUTPATIENT)
Dept: FAMILY MEDICINE CLINIC | Facility: CLINIC | Age: 46
End: 2021-01-27
Payer: COMMERCIAL

## 2021-01-27 VITALS
WEIGHT: 169 LBS | BODY MASS INDEX: 27.16 KG/M2 | TEMPERATURE: 97.2 F | DIASTOLIC BLOOD PRESSURE: 80 MMHG | HEART RATE: 87 BPM | HEIGHT: 66 IN | SYSTOLIC BLOOD PRESSURE: 110 MMHG | OXYGEN SATURATION: 100 %

## 2021-01-27 DIAGNOSIS — B37.9 CANDIDIASIS: Primary | ICD-10-CM

## 2021-01-27 DIAGNOSIS — R09.89 GLOBUS PHARYNGEUS: Primary | ICD-10-CM

## 2021-01-27 PROBLEM — R09.A2 GLOBUS PHARYNGEUS: Status: ACTIVE | Noted: 2021-01-27

## 2021-01-27 PROCEDURE — 99213 OFFICE O/P EST LOW 20 MIN: CPT | Performed by: NURSE PRACTITIONER

## 2021-01-27 RX ORDER — CYCLOBENZAPRINE HCL 5 MG
5 TABLET ORAL
Qty: 30 TABLET | Refills: 0 | Status: SHIPPED | OUTPATIENT
Start: 2021-01-27 | End: 2021-05-18

## 2021-01-27 RX ORDER — SIMETHICONE 180 MG
180 CAPSULE ORAL 2 TIMES DAILY
Qty: 20 CAPSULE | Refills: 0 | Status: SHIPPED | OUTPATIENT
Start: 2021-01-27

## 2021-01-27 RX ORDER — FLUCONAZOLE 150 MG/1
150 TABLET ORAL ONCE
Qty: 2 TABLET | Refills: 1 | Status: SHIPPED | OUTPATIENT
Start: 2021-01-27 | End: 2021-01-27

## 2021-01-27 NOTE — PROGRESS NOTES
Assessment/Plan:  BMI Counseling: Body mass index is 27 28 kg/m²  The BMI is above normal  Nutrition recommendations include decreasing portion sizes, encouraging healthy choices of fruits and vegetables, decreasing fast food intake, limiting drinks that contain sugar, moderation in carbohydrate intake, increasing intake of lean protein, reducing intake of saturated and trans fat and reducing intake of cholesterol  Exercise recommendations include exercising 3-5 times per week and strength training exercises  Globus pharyngeus   Discussed tonsil stones with patient  May use water pick to help flush out  May also try to use carbonated beverage  Simethicone ordered  Use heat at site  To help fluctuate  Advised to monitor for increased swelling, airway occlusion  Problem List Items Addressed This Visit        Other    Globus pharyngeus - Primary      Discussed tonsil stones with patient  May use water pick to help flush out  May also try to use carbonated beverage  Simethicone ordered  Use heat at site  To help fluctuate  Advised to monitor for increased swelling, airway occlusion  Relevant Medications    simethicone (MYLICON,GAS-X) 355 MG capsule            Subjective:      Patient ID: Abbi Das is a 41 y o  female  Pt is being seen with complaints of feeling something stuck in her throat  Ate popcorn on Sat and felt a kernel got stuck  Has tried several attempts to get it out  Denies any  trouble swallowing or difficulty breathing  The following portions of the patient's history were reviewed and updated as appropriate: allergies, current medications, past family history, past medical history, past social history, past surgical history and problem list     Review of Systems   Constitutional: Negative  Negative for fever  HENT: Positive for sore throat (  Feels like something is stuck)  Eyes: Negative  Respiratory: Negative  Negative for shortness of breath  Cardiovascular: Negative  Gastrointestinal: Negative  Endocrine: Negative  Genitourinary: Negative  Musculoskeletal: Negative  Allergic/Immunologic: Negative  Neurological: Negative  Psychiatric/Behavioral: Negative  Objective:      /80 (BP Location: Left arm, Patient Position: Sitting)   Pulse 87   Temp (!) 97 2 °F (36 2 °C) (Tympanic)   Ht 5' 6" (1 676 m)   Wt 76 7 kg (169 lb)   SpO2 100%   BMI 27 28 kg/m²          Physical Exam  Constitutional:       Appearance: Normal appearance  HENT:      Mouth/Throat:      Mouth: Mucous membranes are moist       Comments:  Tonsil stone noted on right side,  Mild edema  Neck:      Musculoskeletal: Normal range of motion  Cardiovascular:      Rate and Rhythm: Normal rate  Pulmonary:      Effort: Pulmonary effort is normal    Neurological:      Mental Status: She is alert  Psychiatric:         Mood and Affect: Mood normal          Behavior: Behavior normal          Thought Content:  Thought content normal          Judgment: Judgment normal            Labs:    Lab Results   Component Value Date    WBC 8 68 10/02/2020    HGB 14 3 10/02/2020    HCT 40 6 10/02/2020    MCV 92 10/02/2020     (H) 10/02/2020     Lab Results   Component Value Date    K 4 0 10/02/2020     10/02/2020    CO2 26 10/02/2020    BUN 11 10/02/2020    CREATININE 0 85 10/02/2020    GLUF 93 10/02/2020    CALCIUM 9 1 10/02/2020    CORRECTEDCA 9 6 10/02/2020    AST 15 10/02/2020    ALT 15 10/02/2020    ALKPHOS 76 10/02/2020    EGFR 97 10/02/2020     Lab Results   Component Value Date    CALCIUM 9 1 10/02/2020    K 4 0 10/02/2020    CO2 26 10/02/2020     10/02/2020    BUN 11 10/02/2020    CREATININE 0 85 10/02/2020

## 2021-01-27 NOTE — TELEPHONE ENCOUNTER
----- Message from [facility] sent at 1/26/2021  8:30 PM EST -----  Regarding: Prescription Question  Contact: [phone]  Good Evening Jayda,   Can I please have one days pills with refills for yeast infection please    Kindly Thank you

## 2021-01-27 NOTE — PATIENT INSTRUCTIONS
Your have a A tonsil stone  Use a lot a water flush it out use a Q-tip if you can safely to get out if at any point you have new feel like it throat is closing up that is a medical emergency drink some so that the gas might help get rid of or dislodged that feeling

## 2021-01-28 NOTE — ASSESSMENT & PLAN NOTE
Discussed tonsil stones with patient  May use water pick to help flush out  May also try to use carbonated beverage  Simethicone ordered  Use heat at site  To help fluctuate  Advised to monitor for increased swelling, airway occlusion

## 2021-02-15 ENCOUNTER — IMMUNIZATIONS (OUTPATIENT)
Dept: FAMILY MEDICINE CLINIC | Facility: HOSPITAL | Age: 46
End: 2021-02-15

## 2021-02-15 DIAGNOSIS — Z23 ENCOUNTER FOR IMMUNIZATION: Primary | ICD-10-CM

## 2021-02-15 PROCEDURE — 0011A SARS-COV-2 / COVID-19 MRNA VACCINE (MODERNA) 100 MCG: CPT

## 2021-02-15 PROCEDURE — 91301 SARS-COV-2 / COVID-19 MRNA VACCINE (MODERNA) 100 MCG: CPT

## 2021-02-17 DIAGNOSIS — F41.9 ANXIETY: ICD-10-CM

## 2021-02-17 RX ORDER — BUSPIRONE HYDROCHLORIDE 5 MG/1
TABLET ORAL
Qty: 60 TABLET | Refills: 0 | Status: SHIPPED | OUTPATIENT
Start: 2021-02-17 | End: 2021-03-05

## 2021-03-03 DIAGNOSIS — F41.9 ANXIETY: ICD-10-CM

## 2021-03-05 RX ORDER — BUSPIRONE HYDROCHLORIDE 5 MG/1
TABLET ORAL
Qty: 180 TABLET | Refills: 1 | Status: SHIPPED | OUTPATIENT
Start: 2021-03-05 | End: 2021-04-28 | Stop reason: SDUPTHER

## 2021-03-12 DIAGNOSIS — B34.9 VIRAL INFECTION, UNSPECIFIED: Primary | ICD-10-CM

## 2021-03-12 PROCEDURE — U0003 INFECTIOUS AGENT DETECTION BY NUCLEIC ACID (DNA OR RNA); SEVERE ACUTE RESPIRATORY SYNDROME CORONAVIRUS 2 (SARS-COV-2) (CORONAVIRUS DISEASE [COVID-19]), AMPLIFIED PROBE TECHNIQUE, MAKING USE OF HIGH THROUGHPUT TECHNOLOGIES AS DESCRIBED BY CMS-2020-01-R: HCPCS | Performed by: NURSE PRACTITIONER

## 2021-03-12 PROCEDURE — U0005 INFEC AGEN DETEC AMPLI PROBE: HCPCS | Performed by: NURSE PRACTITIONER

## 2021-03-13 LAB — SARS-COV-2 RNA RESP QL NAA+PROBE: NEGATIVE

## 2021-03-19 ENCOUNTER — IMMUNIZATIONS (OUTPATIENT)
Dept: FAMILY MEDICINE CLINIC | Facility: HOSPITAL | Age: 46
End: 2021-03-19

## 2021-03-19 DIAGNOSIS — Z23 ENCOUNTER FOR IMMUNIZATION: Primary | ICD-10-CM

## 2021-03-19 PROCEDURE — 0012A SARS-COV-2 / COVID-19 MRNA VACCINE (MODERNA) 100 MCG: CPT

## 2021-03-19 PROCEDURE — 91301 SARS-COV-2 / COVID-19 MRNA VACCINE (MODERNA) 100 MCG: CPT

## 2021-04-23 DIAGNOSIS — Z11.9 ENCOUNTER FOR SCREENING FOR INFECTIOUS AND PARASITIC DISEASES, UNSPECIFIED: Primary | ICD-10-CM

## 2021-04-23 DIAGNOSIS — Z11.52 ENCOUNTER FOR SCREENING FOR COVID-19: Primary | ICD-10-CM

## 2021-04-23 PROCEDURE — U0005 INFEC AGEN DETEC AMPLI PROBE: HCPCS | Performed by: NURSE PRACTITIONER

## 2021-04-23 PROCEDURE — U0003 INFECTIOUS AGENT DETECTION BY NUCLEIC ACID (DNA OR RNA); SEVERE ACUTE RESPIRATORY SYNDROME CORONAVIRUS 2 (SARS-COV-2) (CORONAVIRUS DISEASE [COVID-19]), AMPLIFIED PROBE TECHNIQUE, MAKING USE OF HIGH THROUGHPUT TECHNOLOGIES AS DESCRIBED BY CMS-2020-01-R: HCPCS | Performed by: NURSE PRACTITIONER

## 2021-04-24 LAB — SARS-COV-2 RNA RESP QL NAA+PROBE: NEGATIVE

## 2021-04-28 DIAGNOSIS — F41.9 ANXIETY: ICD-10-CM

## 2021-04-28 RX ORDER — FLUOXETINE 10 MG/1
10 CAPSULE ORAL DAILY
Qty: 30 CAPSULE | Refills: 2 | Status: SHIPPED | OUTPATIENT
Start: 2021-04-28 | End: 2021-04-30 | Stop reason: SDUPTHER

## 2021-04-28 RX ORDER — BUSPIRONE HYDROCHLORIDE 10 MG/1
10 TABLET ORAL 3 TIMES DAILY
Qty: 90 TABLET | Refills: 1 | Status: SHIPPED | OUTPATIENT
Start: 2021-04-28 | End: 2021-07-27

## 2021-04-30 ENCOUNTER — OFFICE VISIT (OUTPATIENT)
Dept: FAMILY MEDICINE CLINIC | Facility: CLINIC | Age: 46
End: 2021-04-30
Payer: COMMERCIAL

## 2021-04-30 VITALS
DIASTOLIC BLOOD PRESSURE: 80 MMHG | OXYGEN SATURATION: 97 % | WEIGHT: 195 LBS | SYSTOLIC BLOOD PRESSURE: 110 MMHG | RESPIRATION RATE: 16 BRPM | TEMPERATURE: 98 F | HEART RATE: 88 BPM | BODY MASS INDEX: 31.34 KG/M2 | HEIGHT: 66 IN

## 2021-04-30 DIAGNOSIS — F32.1 MODERATE MAJOR DEPRESSION, SINGLE EPISODE (HCC): Primary | ICD-10-CM

## 2021-04-30 DIAGNOSIS — F41.9 ANXIETY: ICD-10-CM

## 2021-04-30 DIAGNOSIS — F41.0 PANIC ATTACKS: ICD-10-CM

## 2021-04-30 PROCEDURE — 99214 OFFICE O/P EST MOD 30 MIN: CPT | Performed by: FAMILY MEDICINE

## 2021-04-30 RX ORDER — FLUOXETINE HYDROCHLORIDE 20 MG/1
20 CAPSULE ORAL DAILY
Qty: 30 CAPSULE | Refills: 1 | Status: SHIPPED | OUTPATIENT
Start: 2021-04-30 | End: 2021-07-13

## 2021-04-30 NOTE — ASSESSMENT & PLAN NOTE
Continue the BuSpar 10 mg up to 3 times daily however can increase to 15 mg if she finds that helpful

## 2021-04-30 NOTE — ASSESSMENT & PLAN NOTE
Increase the fluoxetine to 20 mg daily  Follow-up here in 3-4 weeks with either Aleksandra or myself  Continue in [address]

## 2021-04-30 NOTE — PROGRESS NOTES
Assessment/Plan:     Chronic Problems: Moderate major depression, single episode (HCC)    Increase the fluoxetine to 20 mg daily  Follow-up here in 3-4 weeks with either Aleksandra or myself  Continue in [facility]  Panic attacks    Continue the BuSpar 10 mg up to 3 times daily however can increase to 15 mg if she finds that helpful  Visit Diagnosis:  Diagnoses and all orders for this visit:    Moderate major depression, single episode (HCC)    Anxiety  -     FLUoxetine (PROzac) 20 mg capsule; Take 1 capsule (20 mg total) by mouth daily    Panic attacks          Subjective:    Patient ID: Linn Barrow is a 41 y o  female  Pt is here with c/o multiple problems in her life  Having very personal domestic heart chips at home  Having a problem concentrating on her work  Has been struggling for over a year  Pt is having panic attacks every day when she pulls into her home  Is currently in Behavioral therapy, follows with Aleksandra every 3 weeks, takes her meds as directed, and plans on restarting with physical therapy for a previous concussion  Patient needs FMLA paper signed for a leave from work which was given by Idania but apparently the forms were incorrectly signed  Started on 10 mg of fluoxetine but feels no improvement in her mental status  Takes all other meds as directed  No side effects noted  The following portions of the patient's history were reviewed and updated as appropriate: allergies, current medications, past family history, past medical history, past social history, past surgical history and problem list     Review of Systems   Constitutional: Negative for chills, diaphoresis, fatigue and fever  HENT: Negative for ear pain, postnasal drip, sinus pressure, sinus pain and sore throat  Eyes: Negative for pain and visual disturbance  Respiratory: Negative for cough, chest tightness and shortness of breath  Cardiovascular: Negative for chest pain and palpitations  Gastrointestinal: Negative for abdominal pain, constipation, diarrhea, nausea and vomiting  Genitourinary: Negative for dysuria, frequency and urgency  Partial with partial hyster  Musculoskeletal: Negative for arthralgias, gait problem and myalgias  Skin: Negative for rash and wound  Neurological: Negative for dizziness, light-headedness and numbness  Psychiatric/Behavioral: Negative for dysphoric mood and sleep disturbance  The patient is nervous/anxious  Having melt downs at work   Goes into the bathroom quietly         /80   Pulse 88   Temp 98 °F (36 7 °C)   Resp 16   Ht 5' 6" (1 676 m)   Wt 88 5 kg (195 lb)   SpO2 97%   BMI 31 47 kg/m²   Social History     Socioeconomic History    Marital status: /Civil Union     Spouse name: Not on file    Number of children: Not on file    Years of education: Not on file    Highest education level: Not on file   Occupational History    Not on file   Social Needs    Financial resource strain: Not on file    Food insecurity     Worry: Not on file     Inability: Not on file   [employer] needs     Medical: Not on file     Non-medical: Not on file   Tobacco Use    Smoking status: Never Smoker    Smokeless tobacco: Never Used   Substance and Sexual Activity    Alcohol use: Yes     Frequency: Monthly or less    Drug use: No    Sexual activity: Yes     Partners: Male     Birth control/protection: Surgical   Lifestyle    Physical activity     Days per week: Not on file     Minutes per session: Not on file    Stress: Not on file   Relationships    Social connections     Talks on phone: Not on file     Gets together: Not on file     Attends Zoroastrian service: Not on file     Active member of club or organization: Not on file     Attends meetings of clubs or organizations: Not on file     Relationship status: Not on file    Intimate partner violence     Fear of current or ex partner: Not on file     Emotionally abused: Not on file     Physically abused: Not on file     Forced sexual activity: Not on file   Other Topics Concern    Not on file   Social History Narrative    Not on file     Past Medical History:   Diagnosis Date    Fibroid     Head injury     Hereditary spherocytosis ([facility] [address] )      Family History   Problem Relation Age of Onset    Arthritis Mother     Hypertension Family     ADD / ADHD Son     Learning disabilities Son     Breast cancer Neg Hx     Colon cancer Neg Hx     Ovarian cancer Neg Hx     Uterine cancer Neg Hx     Cervical cancer Neg Hx      Past Surgical History:   Procedure Laterality Date    HYSTERECTOMY N/A     SPLENECTOMY      WISDOM TOOTH EXTRACTION         Current Outpatient Medications:     Biotin 1000 MCG tablet, Take 1,000 mcg by mouth daily, Disp: , Rfl:     busPIRone (BUSPAR) 10 mg tablet, Take 1 tablet (10 mg total) by mouth 3 (three) times a day, Disp: 90 tablet, Rfl: 1    Cholecalciferol (VITAMIN D3) 5000 units CAPS, Take by mouth, Disp: , Rfl:     EVENING PRIMROSE OIL PO, Take by mouth, Disp: , Rfl:     FLUoxetine (PROzac) 20 mg capsule, Take 1 capsule (20 mg total) by mouth daily, Disp: 30 capsule, Rfl: 1    fluticasone (FLONASE) 50 mcg/act nasal spray, 2 sprays into each nostril daily, Disp: 16 g, Rfl: 3    FOLIC ACID PO, Take by mouth, Disp: , Rfl:     multivitamin (THERAGRAN) TABS, Take 1 tablet by mouth daily, Disp: , Rfl:     cyclobenzaprine (FLEXERIL) 5 mg tablet, Take 1 tablet (5 mg total) by mouth daily at bedtime, Disp: 30 tablet, Rfl: 0    hydrocortisone 2 5 % cream, Apply topically 2 (two) times a day Applied to face till rash resolved, Disp: 20 g, Rfl: 3    Nerve Stimulator (Pro Comfort Tens Unit) JOSIAH, 1 Device by Does not apply route daily, Disp: 1 Device, Rfl: 0    simethicone (MYLICON,GAS-X) 513 MG capsule, Take 1 capsule (180 mg total) by mouth 2 (two) times a day, Disp: 20 capsule, Rfl: 0    terconazole (TERAZOL 7) 0 4 % vaginal cream, Insert 1 applicator into the vagina daily at bedtime, Disp: 45 g, Rfl: 1    Allergies   Allergen Reactions    Latex Hives and Rash     Contact-GLOVES    Shellfish Allergy - Food Allergy Hives, Rash and Throat Swelling          Lab Review   Orders Only on 04/23/2021   Component Date Value    SARS-CoV-2 04/23/2021 Negative    Orders Only on 03/12/2021   Component Date Value    SARS-CoV-2 03/12/2021 Negative         Imaging: No results found  Objective:     Physical Exam  Vitals signs and nursing note reviewed  Constitutional:       General: She is not in acute distress  Appearance: Normal appearance  She is well-developed  She is not ill-appearing, toxic-appearing or diaphoretic  HENT:      Head: Normocephalic and atraumatic  Right Ear: Tympanic membrane, ear canal and external ear normal  There is no impacted cerumen  Left Ear: Tympanic membrane, ear canal and external ear normal  There is no impacted cerumen  Nose: Nose normal  No congestion  Mouth/Throat:      Mouth: Mucous membranes are moist       Pharynx: No oropharyngeal exudate  Eyes:      General:         Right eye: No discharge  Left eye: No discharge  Extraocular Movements: Extraocular movements intact  Conjunctiva/sclera: Conjunctivae normal       Pupils: Pupils are equal, round, and reactive to light  Neck:      Musculoskeletal: Normal range of motion and neck supple  No neck rigidity  Cardiovascular:      Rate and Rhythm: Normal rate and regular rhythm  Heart sounds: No murmur  Pulmonary:      Effort: Pulmonary effort is normal  No respiratory distress  Breath sounds: Normal breath sounds  Musculoskeletal: Normal range of motion  General: No deformity  Right lower leg: No edema  Left lower leg: No edema  Skin:     General: Skin is warm  Capillary Refill: Capillary refill takes less than 2 seconds  Coloration: Skin is not pale  Findings: No erythema  Neurological:      General: No focal deficit present  Mental Status: She is alert and oriented to person, place, and time  Psychiatric:         Mood and Affect: Mood normal          Behavior: Behavior normal          Thought Content: Thought content normal          Judgment: Judgment normal            Patient Instructions     Reviewed all with patient  Okay to use the BuSpar for panic attacks you can increase if you half due to 15 mg 3 times daily  I am increasing the fluoxetine to 20 mg daily  Keep your follow-up appointment with counseling  Make your appointment back with physical therapy if you still feel you need it status post concussion  Follow-up here in 3-4 weeks with either Aleksandra or myself  You FMLA paper signed  Depression   AMBULATORY CARE:   Depression  is a medical condition that causes feelings of sadness or hopelessness that do not go away  Depression may cause you to lose interest in things you used to enjoy  These feelings may interfere with your daily life  Common symptoms include the following:   · Appetite changes, or weight gain or loss    · Trouble going to sleep or staying asleep, or sleeping too much    · Fatigue or lack of energy    · Feeling restless, irritable, or withdrawn    · Feeling worthless, hopeless, discouraged, or guilty    · Trouble concentrating, remembering things, doing daily tasks, or making decisions    · Thoughts about hurting or killing yourself    Call your local emergency number (911 in the [address] [place] [address],[address]) if:   · You think about harming yourself or someone else  · You have done something on purpose to hurt yourself  Call your therapist or doctor if:   · Your symptoms do not improve  · You cannot make it to your next appointment  · You have new symptoms  · You have questions or concerns about your condition or care      The following resources are available at any time to help you, if needed:   · [address]: 0-864.675.9070 (4-874-786-TALK)     · Suicide Hotline: 4-923.395.2196 (6-709-CJSRPMC)     · For a list of international numbers: https://save org/find-help/international-resources/    Treatment for depression  may include medicine to relieve depression  Medicine is often used together with therapy  Therapy is a way for you to talk about your feelings and anything that may be causing depression  Therapy can be done alone or in a group  It may also be done with family members or a significant other  Self-care:   · Get regular physical activity  Try to be active for 30 minutes, 3 to 5 days a week  Physical activity can help relieve depression  Work with your healthcare provider to develop a plan that you enjoy  It may help to ask someone to be active with you  · Create a regular sleep schedule  A routine can help you relax before bed  Listen to music, read, or do yoga  Try to go to bed and wake up at the same time every day  Sleep is important for emotional health  · Eat a variety of healthy foods  Healthy foods include fruits, vegetables, whole-grain breads, low-fat dairy products, lean meats, fish, and cooked beans  A healthy meal plan is low in fat, salt, and added sugar  · Do not drink alcohol or use drugs  Alcohol and drugs can make depression worse  Talk to your therapist or doctor if you need help quitting  Follow up with your healthcare provider as directed: Your healthcare provider will monitor your progress at follow-up visits  He or she will also monitor your medicine if you take antidepressants  Your healthcare provider will ask if the medicine is helping  Tell him or her about any side effects or problems you may have with your medicine  The type or amount of medicine may need to be changed  Write down your questions so you remember to ask them during your visits    © Copyright [address] Information is for End User's use only and may not be sold, redistributed or otherwise used for commercial purposes  All illustrations and images included in CareNotes® are the copyrighted property of A D A M , Inc  or [facility] Monie Vargas   The above information is an  only  It is not intended as medical advice for individual conditions or treatments  Talk to your doctor, nurse or pharmacist before following any medical regimen to see if it is safe and effective for you  BRITTANY Dumont    Portions of the record may have been created with voice recognition software  Occasional wrong word or "sound a like" substitutions may have occurred due to the inherent limitations of voice recognition software  Read the chart carefully and recognize, using context, where substitutions have occurred  Depression Screening Follow-up Plan: Patient's depression screening was positive with a PHQ-2 score of 6  Their PHQ-9 score was 24  Patient assessed for underlying major depression  They have no active suicidal ideations  Brief counseling provided and recommend additional follow-up/re-evaluation next office visit  Increased the dose of fluoxetine

## 2021-04-30 NOTE — PATIENT INSTRUCTIONS
Reviewed all with patient  Okay to use the BuSpar for panic attacks you can increase if you half due to 15 mg 3 times daily  I am increasing the fluoxetine to 20 mg daily  Keep your follow-up appointment with counseling  Make your appointment back with physical therapy if you still feel you need it status post concussion  Follow-up here in 3-4 weeks with either Aleksandra or myself  You FMLA paper signed  Depression   AMBULATORY CARE:   Depression  is a medical condition that causes feelings of sadness or hopelessness that do not go away  Depression may cause you to lose interest in things you used to enjoy  These feelings may interfere with your daily life  Common symptoms include the following:   · Appetite changes, or weight gain or loss    · Trouble going to sleep or staying asleep, or sleeping too much    · Fatigue or lack of energy    · Feeling restless, irritable, or withdrawn    · Feeling worthless, hopeless, discouraged, or guilty    · Trouble concentrating, remembering things, doing daily tasks, or making decisions    · Thoughts about hurting or killing yourself    Call your local emergency number (911 in the [address] [place] [address],[address]) if:   · You think about harming yourself or someone else  · You have done something on purpose to hurt yourself  Call your therapist or doctor if:   · Your symptoms do not improve  · You cannot make it to your next appointment  · You have new symptoms  · You have questions or concerns about your condition or care  The following resources are available at any time to help you, if needed:   · [address]: 4-737.193.6817 (1-433-426-NWLY)     · Suicide Hotline: 0-787.592.7098 (7-911-OBLGSQH)     · For a list of international numbers: https://save org/find-help/international-resources/    Treatment for depression  may include medicine to relieve depression  Medicine is often used together with therapy   Therapy is a way for you to talk about your feelings and anything that may be causing depression  Therapy can be done alone or in a group  It may also be done with family members or a significant other  Self-care:   · Get regular physical activity  Try to be active for 30 minutes, 3 to 5 days a week  Physical activity can help relieve depression  Work with your healthcare provider to develop a plan that you enjoy  It may help to ask someone to be active with you  · Create a regular sleep schedule  A routine can help you relax before bed  Listen to music, read, or do yoga  Try to go to bed and wake up at the same time every day  Sleep is important for emotional health  · Eat a variety of healthy foods  Healthy foods include fruits, vegetables, whole-grain breads, low-fat dairy products, lean meats, fish, and cooked beans  A healthy meal plan is low in fat, salt, and added sugar  · Do not drink alcohol or use drugs  Alcohol and drugs can make depression worse  Talk to your therapist or doctor if you need help quitting  Follow up with your healthcare provider as directed: Your healthcare provider will monitor your progress at follow-up visits  He or she will also monitor your medicine if you take antidepressants  Your healthcare provider will ask if the medicine is helping  Tell him or her about any side effects or problems you may have with your medicine  The type or amount of medicine may need to be changed  Write down your questions so you remember to ask them during your visits  © Copyright [address] Information is for End User's use only and may not be sold, redistributed or otherwise used for commercial purposes  All illustrations and images included in CareNotes® are the copyrighted property of A D A M , Inc  or [address] [place], [place] [address]   The above information is an  only  It is not intended as medical advice for individual conditions or treatments   Talk to your doctor, nurse or pharmacist before following any medical regimen to see if it is safe and effective for you

## 2021-05-05 ENCOUNTER — OFFICE VISIT (OUTPATIENT)
Dept: FAMILY MEDICINE CLINIC | Facility: CLINIC | Age: 46
End: 2021-05-05
Payer: COMMERCIAL

## 2021-05-05 VITALS — TEMPERATURE: 101 F

## 2021-05-05 DIAGNOSIS — R52 GENERALIZED BODY ACHES: ICD-10-CM

## 2021-05-05 DIAGNOSIS — R11.2 NAUSEA AND VOMITING, INTRACTABILITY OF VOMITING NOT SPECIFIED, UNSPECIFIED VOMITING TYPE: Primary | ICD-10-CM

## 2021-05-05 DIAGNOSIS — K29.00 ACUTE SUPERFICIAL GASTRITIS WITHOUT HEMORRHAGE: ICD-10-CM

## 2021-05-05 DIAGNOSIS — R68.89 FLU-LIKE SYMPTOMS: ICD-10-CM

## 2021-05-05 PROCEDURE — 99213 OFFICE O/P EST LOW 20 MIN: CPT | Performed by: NURSE PRACTITIONER

## 2021-05-05 RX ORDER — ONDANSETRON HYDROCHLORIDE 8 MG/1
8 TABLET, FILM COATED ORAL EVERY 8 HOURS PRN
Qty: 20 TABLET | Refills: 0 | Status: SHIPPED | OUTPATIENT
Start: 2021-05-05 | End: 2021-05-06 | Stop reason: SDUPTHER

## 2021-05-05 NOTE — PROGRESS NOTES
Virtual Regular Visit      Assessment/Plan:    Problem List Items Addressed This Visit        Digestive    Acute superficial gastritis without hemorrhage      Discussed gastritis with patient  Patient   Had a sandwich on Friday and has been having symptoms since then  Increase fluid hydration  Discussed electrolyte replacement  Zofran ordered  Encourage nutrition            Other    Flu-like symptoms       Patient has body aches, fever, chills, diarrhea, nausea  Will get cough it and flu testing done  Increase fluid hydration  Tylenol for fever  Maintain quarantine    Zofran ordered to prevent nausea and vomiting and  promote hydration and nutrition         Relevant Orders    Novel Coronavirus (Covid-19),PCR [facility] - Collected in Office    Influenza A/B and RSV PCR      Other Visit Diagnoses     Nausea and vomiting, intractability of vomiting not specified, unspecified vomiting type    -  Primary    Relevant Medications    ondansetron (ZOFRAN) 8 mg tablet    Other Relevant Orders    Novel Coronavirus (Covid-19),PCR [facility] - Collected in Office    Influenza A/B and RSV PCR    Generalized body aches        Relevant Orders    Novel Coronavirus (Covid-19),PCR [facility] - Collected in Office    Influenza A/B and RSV PCR               Reason for visit is   Chief Complaint   Patient presents with    Virtual Regular Visit        Encounter provider BRITTANY Baird    Provider located at [address] [place] [address]  [address] [place] [id number] [phone]      Recent Visits  Date Type Provider Dept   04/30/21 Office Visit Johanny Chappell, [address] recent visits within past 7 days and meeting all other requirements     Today's Visits  Date Type Provider Dept   05/05/21 Office Visit BRITTANY Baird [address] today's visits and meeting all other requirements Future Appointments  No visits were found meeting these conditions  Showing future appointments within next 150 days and meeting all other requirements        The patient was identified by name and date of birth  Magnolia Cavazos was informed that this is a telemedicine visit and that the visit is being conducted through [address] [place] [address] Road [facility] and patient was informed that this is a secure, HIPAA-compliant platform  She agrees to proceed     My office door was closed  No one else was in the room  She acknowledged consent and understanding of privacy and security of the video platform  The patient has agreed to participate and understands they can discontinue the visit at any time  Patient is aware this is a billable service  Subjective  Magnolia Cavazos is a 41 y o  female    Patient is being seen for virtual visit with complaints of body aches, fever, chills, diarrhea ongoing since Friday  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  Did get her to call with vaccines  States that she ate a sandwich from a daily on Friday night and has been ill since then         Past Medical History:   Diagnosis Date    Fibroid     Head injury     Hereditary spherocytosis ([facility] [address] )        Past Surgical History:   Procedure Laterality Date    HYSTERECTOMY N/A     SPLENECTOMY      WISDOM TOOTH EXTRACTION         Current Outpatient Medications   Medication Sig Dispense Refill    Biotin 1000 MCG tablet Take 1,000 mcg by mouth daily      busPIRone (BUSPAR) 10 mg tablet Take 1 tablet (10 mg total) by mouth 3 (three) times a day 90 tablet 1    Cholecalciferol (VITAMIN D3) 5000 units CAPS Take by mouth      cyclobenzaprine (FLEXERIL) 5 mg tablet Take 1 tablet (5 mg total) by mouth daily at bedtime 30 tablet 0    EVENING PRIMROSE OIL PO Take by mouth      FLUoxetine (PROzac) 20 mg capsule Take 1 capsule (20 mg total) by mouth daily 30 capsule 1    fluticasone (FLONASE) 50 mcg/act nasal spray 2 sprays into each nostril daily 16 g 3    FOLIC ACID PO Take by mouth      hydrocortisone 2 5 % cream Apply topically 2 (two) times a day Applied to face till rash resolved 20 g 3    multivitamin (THERAGRAN) TABS Take 1 tablet by mouth daily      Nerve Stimulator (Pro Comfort Tens Unit) JOSIAH 1 Device by Does not apply route daily 1 Device 0    ondansetron (ZOFRAN) 8 mg tablet Take 1 tablet (8 mg total) by mouth every 8 (eight) hours as needed for nausea or vomiting 20 tablet 0    simethicone (MYLICON,GAS-X) 672 MG capsule Take 1 capsule (180 mg total) by mouth 2 (two) times a day 20 capsule 0    terconazole (TERAZOL 7) 0 4 % vaginal cream Insert 1 applicator into the vagina daily at bedtime 45 g 1     No current facility-administered medications for this visit  Allergies   Allergen Reactions    Latex Hives and Rash     Contact-GLOVES    Shellfish Allergy - Food Allergy Hives, Rash and Throat Swelling       Review of Systems   Constitutional: Positive for chills, fatigue and fever  HENT: Positive for congestion  Eyes: Negative  Respiratory: Negative  Cardiovascular: Negative  Gastrointestinal: Positive for diarrhea and nausea  Endocrine: Negative  Genitourinary: Negative  Musculoskeletal: Negative  Skin: Negative  Allergic/Immunologic: Negative  Neurological: Negative  Psychiatric/Behavioral: Negative  Video Exam    Vitals:    05/05/21 1717   Temp: (!) 101 °F (38 3 °C)       Physical Exam  Constitutional:       Appearance: She is well-developed  She is ill-appearing  HENT:      Head: Normocephalic and atraumatic  Eyes:      General:         Right eye: No discharge  Left eye: No discharge  Neck:      Musculoskeletal: Normal range of motion and neck supple  Pulmonary:      Effort: Pulmonary effort is normal  No respiratory distress  Chest:      Chest wall: No tenderness  Musculoskeletal: Normal range of motion  Skin:     General: Skin is dry     Neurological:      Mental Status: She is alert and oriented to person, place, and time  Psychiatric:         Behavior: Behavior normal          Thought Content: Thought content normal          Judgment: Judgment normal           I spent 15 minutes directly with the patient during this visit      VIRTUAL VISIT DISCLAIMER    Maribel Dey acknowledges that she has consented to an online visit or consultation  She understands that the online visit is based solely on information provided by her, and that, in the absence of a face-to-face physical evaluation by the physician, the diagnosis she receives is both limited and provisional in terms of accuracy and completeness  This is not intended to replace a full medical face-to-face evaluation by the physician  Maribel Dey understands and accepts these terms

## 2021-05-05 NOTE — ASSESSMENT & PLAN NOTE
Patient has body aches, fever, chills, diarrhea, nausea  Will get cough it and flu testing done  Increase fluid hydration  Tylenol for fever  Maintain quarantine    Zofran ordered to prevent nausea and vomiting and  promote hydration and nutrition

## 2021-05-05 NOTE — ASSESSMENT & PLAN NOTE
Discussed gastritis with patient  Patient   Had a sandwich on Friday and has been having symptoms since then  Increase fluid hydration  Discussed electrolyte replacement  Zofran ordered    Encourage nutrition

## 2021-05-06 DIAGNOSIS — R68.89 FLU-LIKE SYMPTOMS: ICD-10-CM

## 2021-05-06 DIAGNOSIS — R11.2 NAUSEA AND VOMITING, INTRACTABILITY OF VOMITING NOT SPECIFIED, UNSPECIFIED VOMITING TYPE: ICD-10-CM

## 2021-05-06 DIAGNOSIS — R52 GENERALIZED BODY ACHES: ICD-10-CM

## 2021-05-06 LAB
FLUAV RNA NPH QL NAA+PROBE: NORMAL
FLUBV RNA NPH QL NAA+PROBE: NORMAL
RSV RNA NPH QL NAA+PROBE: NORMAL

## 2021-05-06 PROCEDURE — U0005 INFEC AGEN DETEC AMPLI PROBE: HCPCS | Performed by: NURSE PRACTITIONER

## 2021-05-06 PROCEDURE — 87631 RESP VIRUS 3-5 TARGETS: CPT | Performed by: NURSE PRACTITIONER

## 2021-05-06 PROCEDURE — U0003 INFECTIOUS AGENT DETECTION BY NUCLEIC ACID (DNA OR RNA); SEVERE ACUTE RESPIRATORY SYNDROME CORONAVIRUS 2 (SARS-COV-2) (CORONAVIRUS DISEASE [COVID-19]), AMPLIFIED PROBE TECHNIQUE, MAKING USE OF HIGH THROUGHPUT TECHNOLOGIES AS DESCRIBED BY CMS-2020-01-R: HCPCS | Performed by: NURSE PRACTITIONER

## 2021-05-06 RX ORDER — ONDANSETRON HYDROCHLORIDE 8 MG/1
8 TABLET, FILM COATED ORAL EVERY 8 HOURS PRN
Qty: 20 TABLET | Refills: 0 | Status: SHIPPED | OUTPATIENT
Start: 2021-05-06 | End: 2021-06-29 | Stop reason: ALTCHOICE

## 2021-05-07 LAB — SARS-COV-2 RNA RESP QL NAA+PROBE: NEGATIVE

## 2021-05-15 DIAGNOSIS — T14.8XXA MUSCLE STRAIN: ICD-10-CM

## 2021-05-18 RX ORDER — CYCLOBENZAPRINE HCL 5 MG
TABLET ORAL
Qty: 30 TABLET | Refills: 0 | Status: SHIPPED | OUTPATIENT
Start: 2021-05-18 | End: 2021-05-24

## 2021-05-22 DIAGNOSIS — T14.8XXA MUSCLE STRAIN: ICD-10-CM

## 2021-05-24 RX ORDER — CYCLOBENZAPRINE HCL 5 MG
TABLET ORAL
Qty: 30 TABLET | Refills: 0 | Status: SHIPPED | OUTPATIENT
Start: 2021-05-24 | End: 2021-08-03 | Stop reason: SDUPTHER

## 2021-06-29 ENCOUNTER — OFFICE VISIT (OUTPATIENT)
Dept: FAMILY MEDICINE CLINIC | Facility: CLINIC | Age: 46
End: 2021-06-29
Payer: COMMERCIAL

## 2021-06-29 VITALS
SYSTOLIC BLOOD PRESSURE: 110 MMHG | TEMPERATURE: 97.5 F | BODY MASS INDEX: 31.05 KG/M2 | OXYGEN SATURATION: 97 % | RESPIRATION RATE: 16 BRPM | WEIGHT: 193.2 LBS | HEIGHT: 66 IN | HEART RATE: 106 BPM | DIASTOLIC BLOOD PRESSURE: 80 MMHG

## 2021-06-29 DIAGNOSIS — F41.0 PANIC ATTACKS: ICD-10-CM

## 2021-06-29 DIAGNOSIS — R30.0 DYSURIA: ICD-10-CM

## 2021-06-29 DIAGNOSIS — F32.1 MODERATE MAJOR DEPRESSION, SINGLE EPISODE (HCC): Primary | ICD-10-CM

## 2021-06-29 PROCEDURE — 99214 OFFICE O/P EST MOD 30 MIN: CPT | Performed by: FAMILY MEDICINE

## 2021-06-29 PROCEDURE — 81001 URINALYSIS AUTO W/SCOPE: CPT | Performed by: FAMILY MEDICINE

## 2021-06-29 PROCEDURE — 87077 CULTURE AEROBIC IDENTIFY: CPT | Performed by: FAMILY MEDICINE

## 2021-06-29 PROCEDURE — 87086 URINE CULTURE/COLONY COUNT: CPT | Performed by: FAMILY MEDICINE

## 2021-06-29 PROCEDURE — 87186 SC STD MICRODIL/AGAR DIL: CPT | Performed by: FAMILY MEDICINE

## 2021-06-29 RX ORDER — BUPROPION HYDROCHLORIDE 150 MG/1
TABLET, EXTENDED RELEASE ORAL
Qty: 60 TABLET | Refills: 0 | Status: SHIPPED | OUTPATIENT
Start: 2021-06-29 | End: 2021-07-23

## 2021-06-29 NOTE — ASSESSMENT & PLAN NOTE
Pt reports she is still having panic attacks  Had a panic attack on Sunday  Advised to try a buspar

## 2021-06-29 NOTE — PROGRESS NOTES
Assessment/Plan:     Chronic Problems: Moderate major depression, single episode (HCC)  Will add Wellbutrin 1 pill daily for 3 days then b i d  and follow-up here in 3-4 weeks  Panic attacks  Pt reports she is still having panic attacks  Had a panic attack on Sunday  Advised to try a buspar  Visit Diagnosis:  Diagnoses and all orders for this visit:    Moderate major depression, single episode (HCC)  -     buPROPion (WELLBUTRIN SR) 150 mg 12 hr tablet; Take 1 po daily for 3 days and then bid  Panic attacks    Dysuria  Comments: Will get ua and c&s  Orders:  -     Urinalysis with microscopic  -     Urine culture; Future          Subjective:    Patient ID: Erika Marte is a 41 y o  female  Pt is here for routine f/u appt  Feels blah  Sleeping a lot  Does not think the fluoxetine is helping  Pt is currently in counseling  Still with anxiety   is out of the home  Kids are doing ok  Feels she is heavily depressed  No suicidal ideations  In the process of getting her house ready to be sold  Feels if she has no appts that day she will be in bed  Wants to increase the meds  Takes all other meds as directed  No side effects noted      The following portions of the patient's history were reviewed and updated as appropriate: allergies, current medications, past family history, past medical history, past social history, past surgical history and problem list     Review of Systems   Constitutional: Positive for diaphoresis  Negative for chills, fatigue and fever  HENT: Negative  Eyes: Negative  Respiratory: Negative for cough, shortness of breath and wheezing  Cardiovascular: Negative for chest pain and palpitations  Gastrointestinal: Negative for abdominal pain, constipation, diarrhea (but frequent bm's since on fluoxetine  ), nausea and vomiting  Endocrine: Positive for heat intolerance  Genitourinary: Positive for urgency  Negative for dysuria and frequency          Pt is s/p hyster   Neurological: Positive for dizziness (comes and goes and pt has a h/o vertigo) and headaches  Negative for light-headedness  Psychiatric/Behavioral: Positive for dysphoric mood and sleep disturbance  Negative for suicidal ideas  The patient is nervous/anxious  Still having panic attacks  /80   Pulse (!) 106   Temp 97 5 °F (36 4 °C)   Resp 16   Ht 5' 6" (1 676 m)   Wt 87 6 kg (193 lb 3 2 oz)   SpO2 97%   BMI 31 18 kg/m²   Social History     Socioeconomic History    Marital status: /Civil Union     Spouse name: Not on file    Number of children: Not on file    Years of education: Not on file    Highest education level: Not on file   Occupational History    Not on file   Tobacco Use    Smoking status: Never Smoker    Smokeless tobacco: Never Used   Substance and Sexual Activity    Alcohol use: Yes    Drug use: No    Sexual activity: Yes     Partners: Male     Birth control/protection: Surgical   Other Topics Concern    Not on file   Social History Narrative    Not on file     Social Determinants of Health     Financial Resource Strain:     Difficulty of Paying Living Expenses:    Food Insecurity:     Worried About Running Out of Food in the Last Year:     [address] in the Last Year:    Transportation Needs:     Lack of Transportation (Medical):      Lack of Transportation (Non-Medical):    Physical Activity:     Days of Exercise per Week:     Minutes of Exercise per Session:    Stress:     Feeling of Stress :    Social Connections:     Frequency of Communication with Friends and Family:     Frequency of Social Gatherings with Friends and Family:     Attends Mu-ism Services:     Active Member of Clubs or Organizations:     Attends Club or Organization Meetings:     Marital Status:    Intimate Partner Violence:     Fear of Current or Ex-Partner:     Emotionally Abused:     Physically Abused:     Sexually Abused:      Past Medical History:   Diagnosis Date    Fibroid     Head injury     [facility]reditary spherocytosis ([facility] [address] )      Family History   Problem Relation Age of Onset    Arthritis Mother     Hypertension Family     ADD / ADHD Son     Learning disabilities Son     Breast cancer Neg Hx     Colon cancer Neg Hx     Ovarian cancer Neg Hx     Uterine cancer Neg Hx     Cervical cancer Neg Hx      Past Surgical History:   Procedure Laterality Date    HYSTERECTOMY N/A     SPLENECTOMY      WISDOM TOOTH EXTRACTION         Current Outpatient Medications:     Biotin 1000 MCG tablet, Take 1,000 mcg by mouth daily, Disp: , Rfl:     busPIRone (BUSPAR) 10 mg tablet, Take 1 tablet (10 mg total) by mouth 3 (three) times a day, Disp: 90 tablet, Rfl: 1    Cholecalciferol (VITAMIN D3) 5000 units CAPS, Take by mouth, Disp: , Rfl:     EVENING PRIMROSE OIL PO, Take by mouth, Disp: , Rfl:     FLUoxetine (PROzac) 20 mg capsule, Take 1 capsule (20 mg total) by mouth daily, Disp: 30 capsule, Rfl: 1    fluticasone (FLONASE) 50 mcg/act nasal spray, 2 sprays into each nostril daily, Disp: 16 g, Rfl: 3    FOLIC ACID PO, Take by mouth, Disp: , Rfl:     buPROPion (WELLBUTRIN SR) 150 mg 12 hr tablet, Take 1 po daily for 3 days and then bid , Disp: 60 tablet, Rfl: 0    cyclobenzaprine (FLEXERIL) 5 mg tablet, TAKE 1 TABLET BY MOUTH EVERYDAY AT BEDTIME, Disp: 30 tablet, Rfl: 0    hydrocortisone 2 5 % cream, Apply topically 2 (two) times a day Applied to face till rash resolved, Disp: 20 g, Rfl: 3    multivitamin (THERAGRAN) TABS, Take 1 tablet by mouth daily, Disp: , Rfl:     Nerve Stimulator (Pro Comfort Tens Unit) JOSIAH, 1 Device by Does not apply route daily, Disp: 1 Device, Rfl: 0    simethicone (MYLICON,GAS-X) 704 MG capsule, Take 1 capsule (180 mg total) by mouth 2 (two) times a day, Disp: 20 capsule, Rfl: 0    Allergies   Allergen Reactions    Latex Hives and Rash     Contact-GLOVES    Shellfish Allergy - Food Allergy Hives, Rash and Throat Swelling          Lab Review   Orders Only on 05/06/2021   Component Date Value    INFLUENZA A PCR 05/06/2021 None Detected     INFLUENZA B PCR 05/06/2021 None Detected     RSV PCR 05/06/2021 None Detected    Office Visit on 05/05/2021   Component Date Value    SARS-CoV-2 05/06/2021 Negative         Imaging: No results found  Objective:     Physical Exam  Vitals and nursing note reviewed  Constitutional:       General: She is not in acute distress  Appearance: Normal appearance  She is well-developed  She is not ill-appearing, toxic-appearing or diaphoretic  HENT:      Head: Normocephalic and atraumatic  Right Ear: Tympanic membrane, ear canal and external ear normal  There is no impacted cerumen  Left Ear: Tympanic membrane, ear canal and external ear normal  There is no impacted cerumen  Nose: Nose normal  No congestion  Mouth/Throat:      Mouth: Mucous membranes are moist       Pharynx: No oropharyngeal exudate  Eyes:      General:         Right eye: No discharge  Left eye: No discharge  Extraocular Movements: Extraocular movements intact  Conjunctiva/sclera: Conjunctivae normal       Pupils: Pupils are equal, round, and reactive to light  Cardiovascular:      Rate and Rhythm: Normal rate and regular rhythm  Heart sounds: No murmur heard  Pulmonary:      Effort: Pulmonary effort is normal  No respiratory distress  Breath sounds: Normal breath sounds  Musculoskeletal:         General: No deformity  Normal range of motion  Cervical back: Normal range of motion and neck supple  No rigidity  Right lower leg: No edema  Left lower leg: No edema  Skin:     General: Skin is warm  Capillary Refill: Capillary refill takes less than 2 seconds  Coloration: Skin is not pale  Findings: No erythema  Neurological:      General: No focal deficit present        Mental Status: She is alert and oriented to person, place, and time    Psychiatric:         Mood and Affect: Mood normal          Behavior: Behavior normal          Thought Content: Thought content normal          Judgment: Judgment normal            Patient Instructions     Reviewed with patient  Will add Wellbutrin  Once daily for 3 days and then 1 pill twice daily  If you do well on this medication next month we could change it to the once daily Wellbutrin  For now continue your counseling, stay on your fluoxetine, and make your follow-up in 3 weeks  When you have a panic attack try a BuSpar  Will call with ua and c&s  Plenty of water  BRITTANY Dumont    Portions of the record may have been created with voice recognition software  Occasional wrong word or "sound a like" substitutions may have occurred due to the inherent limitations of voice recognition software  Read the chart carefully and recognize, using context, where substitutions have occurred

## 2021-06-29 NOTE — PATIENT INSTRUCTIONS
Reviewed with patient  Will add Wellbutrin  Once daily for 3 days and then 1 pill twice daily  If you do well on this medication next month we could change it to the once daily Wellbutrin  For now continue your counseling, stay on your fluoxetine, and make your follow-up in 3 weeks  When you have a panic attack try a BuSpar  Will call with ua and c&s  Plenty of water

## 2021-06-30 LAB
BACTERIA UR QL AUTO: ABNORMAL /HPF
BILIRUB UR QL STRIP: NEGATIVE
CLARITY UR: CLEAR
COLOR UR: ABNORMAL
GLUCOSE UR STRIP-MCNC: NEGATIVE MG/DL
HGB UR QL STRIP.AUTO: ABNORMAL
HYALINE CASTS #/AREA URNS LPF: ABNORMAL /LPF
KETONES UR STRIP-MCNC: ABNORMAL MG/DL
LEUKOCYTE ESTERASE UR QL STRIP: NEGATIVE
NITRITE UR QL STRIP: POSITIVE
NON-SQ EPI CELLS URNS QL MICRO: ABNORMAL /HPF
PH UR STRIP.AUTO: 6 [PH]
PROT UR STRIP-MCNC: NEGATIVE MG/DL
RBC #/AREA URNS AUTO: ABNORMAL /HPF
SP GR UR STRIP.AUTO: 1.02 (ref 1–1.03)
UROBILINOGEN UR QL STRIP.AUTO: 0.2 E.U./DL
WBC #/AREA URNS AUTO: ABNORMAL /HPF

## 2021-07-02 LAB — BACTERIA UR CULT: ABNORMAL

## 2021-07-13 DIAGNOSIS — F41.9 ANXIETY: ICD-10-CM

## 2021-07-13 RX ORDER — FLUOXETINE HYDROCHLORIDE 20 MG/1
CAPSULE ORAL
Qty: 30 CAPSULE | Refills: 1 | Status: SHIPPED | OUTPATIENT
Start: 2021-07-13 | End: 2021-08-03 | Stop reason: SDUPTHER

## 2021-07-22 ENCOUNTER — OFFICE VISIT (OUTPATIENT)
Dept: FAMILY MEDICINE CLINIC | Facility: CLINIC | Age: 46
End: 2021-07-22
Payer: COMMERCIAL

## 2021-07-22 VITALS
WEIGHT: 194 LBS | RESPIRATION RATE: 14 BRPM | DIASTOLIC BLOOD PRESSURE: 80 MMHG | BODY MASS INDEX: 31.18 KG/M2 | HEART RATE: 88 BPM | TEMPERATURE: 97.5 F | OXYGEN SATURATION: 98 % | SYSTOLIC BLOOD PRESSURE: 110 MMHG | HEIGHT: 66 IN

## 2021-07-22 DIAGNOSIS — F32.1 MODERATE MAJOR DEPRESSION, SINGLE EPISODE (HCC): Primary | ICD-10-CM

## 2021-07-22 DIAGNOSIS — F41.0 PANIC ATTACKS: ICD-10-CM

## 2021-07-22 PROCEDURE — 99213 OFFICE O/P EST LOW 20 MIN: CPT | Performed by: FAMILY MEDICINE

## 2021-07-22 NOTE — PATIENT INSTRUCTIONS
Reviewed all with patient  She feels she would like to give returning to work for a try  She is cleared to work on Monday  I do believe she may need some accommodations that she has a panic attack or anxiety  She was advised to use her BuSpar as needed and keep going with counseling  Keep follow-up appointment with Karl Esteves

## 2021-07-22 NOTE — PROGRESS NOTES
Assessment/Plan:     Chronic Problems: Moderate major depression, single episode ([address] )  Stay on the wellbutrin  Keep the f/u appt counseling  Panic attacks  Use the buspar as this is helping  Ok to return to work with accommodations if needed  Visit Diagnosis:  Diagnoses and all orders for this visit:    Moderate major depression, single episode (HCC)    Panic attacks          Subjective:    Patient ID: Ashley Perez is a 41 y o  female  Pt is here for routine f/u  She has been out of work as she is in counseling and going through tough times at home and a custody rashid  Needs to find a place to live  Just lost her dad  Feels her stress level is very high  Wants to come back to work on Monday as planned  Not sure that she is totally ready, but willing to give it a try  Loves the bupropion  Using the buspar for anxiety and it helps  Takes all other meds as directed  No side effects noted  The following portions of the patient's history were reviewed and updated as appropriate: allergies, current medications, past family history, past medical history, past social history, past surgical history and problem list     Review of Systems   Constitutional: Negative for chills, diaphoresis, fatigue and fever  HENT: Negative for ear pain, postnasal drip, sinus pressure, sinus pain and sore throat  Eyes: Negative for pain and visual disturbance  Respiratory: Negative for cough, chest tightness and shortness of breath  Cardiovascular: Positive for palpitations (when she is panicky)  Negative for chest pain  Gastrointestinal: Negative for abdominal pain, constipation, diarrhea, nausea and vomiting  Genitourinary: Negative for dysuria, frequency and urgency  S/p hyster  Musculoskeletal: Negative for arthralgias, gait problem and myalgias  Skin: Negative for rash and wound  Neurological: Negative for dizziness, light-headedness and numbness     Psychiatric/Behavioral: Positive for dysphoric mood  Negative for sleep disturbance  The patient is nervous/anxious  Wants to put her life back together  /80   Pulse 88   Temp 97 5 °F (36 4 °C)   Resp 14   Ht 5' 6" (1 676 m)   Wt 88 kg (194 lb)   SpO2 98%   BMI 31 31 kg/m²   Social History     Socioeconomic History    Marital status: /Civil Union     Spouse name: Not on file    Number of children: Not on file    Years of education: Not on file    Highest education level: Not on file   Occupational History    Not on file   Tobacco Use    Smoking status: Never Smoker    Smokeless tobacco: Never Used   Substance and Sexual Activity    Alcohol use: Yes    Drug use: No    Sexual activity: Yes     Partners: Male     Birth control/protection: Surgical   Other Topics Concern    Not on file   Social History Narrative    Not on file     Social Determinants of Health     Financial Resource Strain:     Difficulty of Paying Living Expenses:    Food Insecurity:     Worried About Running Out of Food in the Last Year:     [address] in the Last Year:    Transportation Needs:     Lack of Transportation (Medical):      Lack of Transportation (Non-Medical):    Physical Activity:     Days of Exercise per Week:     Minutes of Exercise per Session:    Stress:     Feeling of Stress :    Social Connections:     Frequency of Communication with Friends and Family:     Frequency of Social Gatherings with Friends and Family:     Attends Spiritism Services:     Active Member of Clubs or Organizations:     Attends Club or Organization Meetings:     Marital Status:    Intimate Partner Violence:     Fear of Current or Ex-Partner:     Emotionally Abused:     Physically Abused:     Sexually Abused:      Past Medical History:   Diagnosis Date    Fibroid     Head injury     [facility] [address]     Moderate major depression, single episode ([facility] ) 4/30/2021     Family History   Problem Relation Age of Onset    Arthritis Mother     Hypertension Family     ADD / ADHD Son     Learning disabilities Son     Breast cancer Neg Hx     Colon cancer Neg Hx     Ovarian cancer Neg Hx     Uterine cancer Neg Hx     Cervical cancer Neg Hx      Past Surgical History:   Procedure Laterality Date    HYSTERECTOMY N/A     SPLENECTOMY      WISDOM TOOTH EXTRACTION         Current Outpatient Medications:     Biotin 1000 MCG tablet, Take 1,000 mcg by mouth daily, Disp: , Rfl:     buPROPion (WELLBUTRIN SR) 150 mg 12 hr tablet, Take 1 po daily for 3 days and then bid , Disp: 60 tablet, Rfl: 0    busPIRone (BUSPAR) 10 mg tablet, Take 1 tablet (10 mg total) by mouth 3 (three) times a day, Disp: 90 tablet, Rfl: 1    Cholecalciferol (VITAMIN D3) 5000 units CAPS, Take by mouth, Disp: , Rfl:     EVENING PRIMROSE OIL PO, Take by mouth, Disp: , Rfl:     FLUoxetine (PROzac) 20 mg capsule, TAKE 1 CAPSULE BY MOUTH EVERY DAY, Disp: 30 capsule, Rfl: 1    fluticasone (FLONASE) 50 mcg/act nasal spray, 2 sprays into each nostril daily, Disp: 16 g, Rfl: 3    FOLIC ACID PO, Take by mouth, Disp: , Rfl:     multivitamin (THERAGRAN) TABS, Take 1 tablet by mouth daily, Disp: , Rfl:     cyclobenzaprine (FLEXERIL) 5 mg tablet, TAKE 1 TABLET BY MOUTH EVERYDAY AT BEDTIME, Disp: 30 tablet, Rfl: 0    hydrocortisone 2 5 % cream, Apply topically 2 (two) times a day Applied to face till rash resolved, Disp: 20 g, Rfl: 3    Nerve Stimulator (Pro Comfort Tens Unit) JOSIAH, 1 Device by Does not apply route daily, Disp: 1 Device, Rfl: 0    simethicone (MYLICON,GAS-X) 049 MG capsule, Take 1 capsule (180 mg total) by mouth 2 (two) times a day, Disp: 20 capsule, Rfl: 0    Allergies   Allergen Reactions    Latex Hives and Rash     Contact-GLOVES    Shellfish Allergy - Food Allergy Hives, Rash and Throat Swelling          Lab Review   Office Visit on 06/29/2021   Component Date Value    Clarity, UA 06/29/2021 Clear     Color, UA 06/29/2021 Dk Yellow  Specific [place], UA 06/29/2021 1 022     pH, UA 06/29/2021 6 0     Glucose, UA 06/29/2021 Negative     Ketones, UA 06/29/2021 Trace*    Blood, UA 06/29/2021 Trace*    Protein, UA 06/29/2021 Negative     Nitrite, UA 06/29/2021 Positive*    Bilirubin, UA 06/29/2021 Negative     Urobilinogen, UA 06/29/2021 0 2     Leukocytes, UA 06/29/2021 Negative     WBC, UA 06/29/2021 4-10*    RBC, UA 06/29/2021 2-4     Hyaline Casts, UA 06/29/2021 None Seen     Bacteria, UA 06/29/2021 Innumerable*    Epithelial Cells 06/29/2021 None Seen     Urine Culture 06/29/2021 >100,000 cfu/ml Escherichia coli*        Imaging: No results found  Objective:     Physical Exam  Constitutional:       General: She is not in acute distress  Appearance: Normal appearance  She is well-developed  She is not ill-appearing, toxic-appearing or diaphoretic  HENT:      Head: Normocephalic and atraumatic  Right Ear: Tympanic membrane, ear canal and external ear normal  There is no impacted cerumen  Left Ear: Tympanic membrane, ear canal and external ear normal  There is no impacted cerumen  Nose: Nose normal  No congestion  Mouth/Throat:      Mouth: Mucous membranes are moist       Pharynx: No oropharyngeal exudate  Eyes:      General:         Right eye: No discharge  Left eye: No discharge  Extraocular Movements: Extraocular movements intact  Conjunctiva/sclera: Conjunctivae normal       Pupils: Pupils are equal, round, and reactive to light  Cardiovascular:      Rate and Rhythm: Normal rate and regular rhythm  Heart sounds: No murmur heard  Pulmonary:      Effort: Pulmonary effort is normal  No respiratory distress  Breath sounds: Normal breath sounds  Musculoskeletal:         General: No deformity  Normal range of motion  Cervical back: Normal range of motion and neck supple  No rigidity  Right lower leg: No edema  Left lower leg: No edema     Skin: General: Skin is warm  Capillary Refill: Capillary refill takes less than 2 seconds  Coloration: Skin is not pale  Findings: No erythema  Neurological:      General: No focal deficit present  Mental Status: She is alert and oriented to person, place, and time  Psychiatric:         Behavior: Behavior normal          Thought Content: Thought content normal          Judgment: Judgment normal       Comments: Appropriately tearful at times  Patient Instructions   Reviewed all with patient  She feels she would like to give returning to work for a try  She is cleared to work on Monday  I do believe she may need some accommodations that she has a panic attack or anxiety  She was advised to use her BuSpar as needed and keep going with counseling  Keep follow-up appointment with BRITTANY Eason    Portions of the record may have been created with voice recognition software  Occasional wrong word or "sound a like" substitutions may have occurred due to the inherent limitations of voice recognition software  Read the chart carefully and recognize, using context, where substitutions have occurred

## 2021-07-23 DIAGNOSIS — F32.1 MODERATE MAJOR DEPRESSION, SINGLE EPISODE (HCC): ICD-10-CM

## 2021-07-23 RX ORDER — BUPROPION HYDROCHLORIDE 150 MG/1
TABLET, EXTENDED RELEASE ORAL
Qty: 60 TABLET | Refills: 0 | Status: SHIPPED | OUTPATIENT
Start: 2021-07-23 | End: 2021-08-03 | Stop reason: SDUPTHER

## 2021-07-26 ENCOUNTER — TELEPHONE (OUTPATIENT)
Dept: FAMILY MEDICINE CLINIC | Facility: CLINIC | Age: 46
End: 2021-07-26

## 2021-07-27 RX ORDER — BUSPIRONE HYDROCHLORIDE 10 MG/1
TABLET ORAL
Qty: 90 TABLET | Refills: 1 | Status: SHIPPED | OUTPATIENT
Start: 2021-07-27 | End: 2021-08-03 | Stop reason: SDUPTHER

## 2021-08-03 ENCOUNTER — OFFICE VISIT (OUTPATIENT)
Dept: FAMILY MEDICINE CLINIC | Facility: CLINIC | Age: 46
End: 2021-08-03
Payer: COMMERCIAL

## 2021-08-03 VITALS
OXYGEN SATURATION: 98 % | BODY MASS INDEX: 30.86 KG/M2 | SYSTOLIC BLOOD PRESSURE: 112 MMHG | HEIGHT: 66 IN | WEIGHT: 192 LBS | DIASTOLIC BLOOD PRESSURE: 80 MMHG | HEART RATE: 88 BPM

## 2021-08-03 DIAGNOSIS — F41.9 ANXIETY: ICD-10-CM

## 2021-08-03 DIAGNOSIS — T14.8XXA MUSCLE STRAIN: ICD-10-CM

## 2021-08-03 DIAGNOSIS — F32.1 MODERATE MAJOR DEPRESSION, SINGLE EPISODE (HCC): ICD-10-CM

## 2021-08-03 PROBLEM — Z01.419 ENCOUNTER FOR GYNECOLOGICAL EXAMINATION WITHOUT ABNORMAL FINDING: Status: RESOLVED | Noted: 2019-03-26 | Resolved: 2021-08-03

## 2021-08-03 PROBLEM — R68.89 FLU-LIKE SYMPTOMS: Status: RESOLVED | Noted: 2021-05-05 | Resolved: 2021-08-03

## 2021-08-03 PROCEDURE — 99213 OFFICE O/P EST LOW 20 MIN: CPT | Performed by: NURSE PRACTITIONER

## 2021-08-03 RX ORDER — FLUOXETINE HYDROCHLORIDE 20 MG/1
20 CAPSULE ORAL DAILY
Qty: 90 CAPSULE | Refills: 1 | Status: SHIPPED | OUTPATIENT
Start: 2021-08-03 | End: 2022-04-01 | Stop reason: SDUPTHER

## 2021-08-03 RX ORDER — CYCLOBENZAPRINE HCL 5 MG
5 TABLET ORAL
Qty: 90 TABLET | Refills: 1 | Status: SHIPPED | OUTPATIENT
Start: 2021-08-03 | End: 2022-04-01 | Stop reason: SDUPTHER

## 2021-08-03 RX ORDER — BUSPIRONE HYDROCHLORIDE 10 MG/1
10 TABLET ORAL 3 TIMES DAILY
Qty: 270 TABLET | Refills: 1 | Status: SHIPPED | OUTPATIENT
Start: 2021-08-03 | End: 2022-04-01 | Stop reason: SDUPTHER

## 2021-08-03 RX ORDER — BUPROPION HYDROCHLORIDE 150 MG/1
150 TABLET, EXTENDED RELEASE ORAL 2 TIMES DAILY
Qty: 180 TABLET | Refills: 1 | Status: SHIPPED | OUTPATIENT
Start: 2021-08-03 | End: 2022-04-01 | Stop reason: SDUPTHER

## 2021-08-03 NOTE — ASSESSMENT & PLAN NOTE
Will continue current medications as these are working well for patient  Given refill for 90 days  Continue therapy

## 2021-08-03 NOTE — PROGRESS NOTES
Assessment/Plan:     Chronic Problems: Moderate major depression, single episode ([address] )    Will continue current medications as these are working well for patient  Given refill for 90 days  Continue therapy  Anxiety    Doing well with current medications  Continue therapy  Visit Diagnosis:  Diagnoses and all orders for this visit:    Moderate major depression, single episode ([facility])  -     buPROPion (WELLBUTRIN SR) 150 mg 12 hr tablet; Take 1 tablet (150 mg total) by mouth 2 (two) times a day    Anxiety  -     busPIRone (BUSPAR) 10 mg tablet; Take 1 tablet (10 mg total) by mouth 3 (three) times a day  -     FLUoxetine (PROzac) 20 mg capsule; Take 1 capsule (20 mg total) by mouth daily    Muscle strain  -     cyclobenzaprine (FLEXERIL) 5 mg tablet; Take 1 tablet (5 mg total) by mouth daily at bedtime          Subjective:    Patient ID: Ptier Pratt is a 41 y o  female  Patient presents for follow-up depression  Patient was recently started on Wellbutrin  She states the Wellbutrin, Prozac and BuSpar her working well for her depression and anxiety  Patient with current situation at home  Patient feels depression is currently 7/10, but is manageable  Patient continues with therapy  The following portions of the patient's history were reviewed and updated as appropriate: allergies, current medications, past family history, past medical history, past social history, past surgical history and problem list     Review of Systems   Constitutional: Negative for chills and fever  HENT: Negative for ear pain and sore throat  Eyes: Negative for pain and visual disturbance  Respiratory: Negative for cough and shortness of breath  Cardiovascular: Negative for chest pain and palpitations  Gastrointestinal: Negative for abdominal pain and vomiting  Genitourinary: Negative for dysuria and hematuria  Musculoskeletal: Negative for arthralgias and back pain     Skin: Negative for color change and single [facility] ([facility] ) 4/30/2021     Family History   Problem Relation Age of Onset    Arthritis Mother     Hypertension Family     ADD / ADHD Son     Learning disabilities Son     Breast cancer Neg Hx     Colon cancer Neg Hx     Ovarian cancer Neg Hx     Uterine cancer Neg Hx     Cervical cancer Neg Hx      Past Surgical History:   Procedure Laterality Date    HYSTERECTOMY N/A     SPLENECTOMY      WISDOM TOOTH EXTRACTION         Current Outpatient Medications:     Biotin 1000 MCG tablet, Take 1,000 mcg by mouth daily, Disp: , Rfl:     buPROPion (WELLBUTRIN SR) 150 mg 12 hr tablet, Take 1 tablet (150 mg total) by mouth 2 (two) times a day, Disp: 180 tablet, Rfl: 1    busPIRone (BUSPAR) 10 mg tablet, Take 1 tablet (10 mg total) by mouth 3 (three) times a day, Disp: 270 tablet, Rfl: 1    Cholecalciferol (VITAMIN D3) 5000 units CAPS, Take by mouth, Disp: , Rfl:     cyclobenzaprine (FLEXERIL) 5 mg tablet, Take 1 tablet (5 mg total) by mouth daily at bedtime, Disp: 90 tablet, Rfl: 1    EVENING PRIMROSE OIL PO, Take by mouth, Disp: , Rfl:     FLUoxetine (PROzac) 20 mg capsule, Take 1 capsule (20 mg total) by mouth daily, Disp: 90 capsule, Rfl: 1    fluticasone (FLONASE) 50 mcg/act nasal spray, 2 sprays into each nostril daily, Disp: 16 g, Rfl: 3    FOLIC ACID PO, Take by mouth, Disp: , Rfl:     hydrocortisone 2 5 % cream, Apply topically 2 (two) times a day Applied to face till rash resolved, Disp: 20 g, Rfl: 3    multivitamin (THERAGRAN) TABS, Take 1 tablet by mouth daily, Disp: , Rfl:     Nerve Stimulator (Pro Comfort Tens Unit) JOSIAH, 1 Device by Does not apply route daily, Disp: 1 Device, Rfl: 0    simethicone (MYLICON,GAS-X) 659 MG capsule, Take 1 capsule (180 mg total) by mouth 2 (two) times a day, Disp: 20 capsule, Rfl: 0    Allergies   Allergen Reactions    Latex Hives and Rash     Contact-GLOVES    Shellfish Allergy - Food Allergy Hives, Rash and Throat Swelling          Lab Review Office Visit on 06/29/2021   Component Date Value    Clarity, UA 06/29/2021 Clear     Color, UA 06/29/2021 Dk Yellow     Specific Gravity, UA 06/29/2021 1 022     pH, UA 06/29/2021 6 0     Glucose, UA 06/29/2021 Negative     Ketones, UA 06/29/2021 Trace*    Blood, UA 06/29/2021 Trace*    Protein, UA 06/29/2021 Negative     Nitrite, UA 06/29/2021 Positive*    Bilirubin, UA 06/29/2021 Negative     Urobilinogen, UA 06/29/2021 0 2     Leukocytes, UA 06/29/2021 Negative     WBC, UA 06/29/2021 4-10*    RBC, UA 06/29/2021 2-4     Hyaline Casts, UA 06/29/2021 None Seen     Bacteria, UA 06/29/2021 Innumerable*    Epithelial Cells 06/29/2021 None Seen     Urine Culture 06/29/2021 >100,000 cfu/ml Escherichia coli*        Imaging: No results found  Objective:     Physical Exam  Constitutional:       Appearance: She is well-developed  Cardiovascular:      Rate and Rhythm: Normal rate and regular rhythm  Heart sounds: Normal heart sounds  No murmur heard  Pulmonary:      Effort: Pulmonary effort is normal  No respiratory distress  Breath sounds: Normal breath sounds  Skin:     General: Skin is warm and dry  Neurological:      Mental Status: She is alert and oriented to person, place, and time  There are no Patient Instructions on file for this visit  BRITTANY Looney    Portions of the record may have been created with voice recognition software  Occasional wrong word or "sound a like" substitutions may have occurred due to the inherent limitations of voice recognition software  Read the chart carefully and recognize, using context, where substitutions have occurred

## 2021-10-06 ENCOUNTER — TELEPHONE (OUTPATIENT)
Dept: OBGYN CLINIC | Facility: CLINIC | Age: 46
End: 2021-10-06

## 2021-10-06 DIAGNOSIS — B37.9 CANDIDIASIS: Primary | ICD-10-CM

## 2021-10-07 DIAGNOSIS — B37.9 CANDIDIASIS: Primary | ICD-10-CM

## 2021-10-07 RX ORDER — FLUCONAZOLE 150 MG/1
150 TABLET ORAL ONCE
Qty: 2 TABLET | Refills: 0 | Status: SHIPPED | OUTPATIENT
Start: 2021-10-07 | End: 2021-10-07

## 2021-10-08 ENCOUNTER — CLINICAL SUPPORT (OUTPATIENT)
Dept: FAMILY MEDICINE CLINIC | Facility: CLINIC | Age: 46
End: 2021-10-08
Payer: COMMERCIAL

## 2021-10-08 DIAGNOSIS — R30.0 DYSURIA: Primary | ICD-10-CM

## 2021-10-08 LAB
SL AMB  POCT GLUCOSE, UA: ABNORMAL
SL AMB LEUKOCYTE ESTERASE,UA: ABNORMAL
SL AMB POCT BILIRUBIN,UA: ABNORMAL
SL AMB POCT BLOOD,UA: ABNORMAL
SL AMB POCT CLARITY,UA: ABNORMAL
SL AMB POCT COLOR,UA: YELLOW
SL AMB POCT KETONES,UA: ABNORMAL
SL AMB POCT NITRITE,UA: ABNORMAL
SL AMB POCT PH,UA: 6
SL AMB POCT SPECIFIC GRAVITY,UA: 1.03
SL AMB POCT URINE PROTEIN: 15
SL AMB POCT UROBILINOGEN: 0.2

## 2021-10-08 PROCEDURE — 81002 URINALYSIS NONAUTO W/O SCOPE: CPT

## 2021-10-08 PROCEDURE — 87086 URINE CULTURE/COLONY COUNT: CPT | Performed by: NURSE PRACTITIONER

## 2021-10-10 LAB — BACTERIA UR CULT: NORMAL

## 2021-10-11 RX ORDER — FLUCONAZOLE 150 MG/1
150 TABLET ORAL ONCE
Qty: 2 TABLET | Refills: 0 | Status: SHIPPED | OUTPATIENT
Start: 2021-10-11 | End: 2021-10-11

## 2021-11-03 ENCOUNTER — ANNUAL EXAM (OUTPATIENT)
Dept: OBGYN CLINIC | Facility: CLINIC | Age: 46
End: 2021-11-03
Payer: COMMERCIAL

## 2021-11-03 VITALS
BODY MASS INDEX: 31.66 KG/M2 | WEIGHT: 197 LBS | HEIGHT: 66 IN | SYSTOLIC BLOOD PRESSURE: 118 MMHG | DIASTOLIC BLOOD PRESSURE: 78 MMHG

## 2021-11-03 DIAGNOSIS — Z01.419 ENCOUNTER FOR GYNECOLOGICAL EXAMINATION WITHOUT ABNORMAL FINDING: ICD-10-CM

## 2021-11-03 DIAGNOSIS — Z12.11 COLON CANCER SCREENING: ICD-10-CM

## 2021-11-03 DIAGNOSIS — Z12.31 SCREENING MAMMOGRAM, ENCOUNTER FOR: ICD-10-CM

## 2021-11-03 DIAGNOSIS — R10.2 PELVIC PAIN: ICD-10-CM

## 2021-11-03 DIAGNOSIS — Z90.710 H/O: HYSTERECTOMY: ICD-10-CM

## 2021-11-03 DIAGNOSIS — L30.9 DERMATITIS: ICD-10-CM

## 2021-11-03 DIAGNOSIS — B37.9 CANDIDIASIS: Primary | ICD-10-CM

## 2021-11-03 PROCEDURE — 99396 PREV VISIT EST AGE 40-64: CPT | Performed by: NURSE PRACTITIONER

## 2021-11-03 PROCEDURE — 87086 URINE CULTURE/COLONY COUNT: CPT | Performed by: NURSE PRACTITIONER

## 2021-11-03 RX ORDER — CLOTRIMAZOLE AND BETAMETHASONE DIPROPIONATE 10; .64 MG/G; MG/G
CREAM TOPICAL 2 TIMES DAILY
Qty: 45 G | Refills: 1 | Status: SHIPPED | OUTPATIENT
Start: 2021-11-03

## 2021-11-03 RX ORDER — FLUCONAZOLE 150 MG/1
150 TABLET ORAL ONCE
Qty: 2 TABLET | Refills: 2 | Status: SHIPPED | OUTPATIENT
Start: 2021-11-03 | End: 2021-11-03

## 2021-11-03 RX ORDER — FLUCONAZOLE 150 MG/1
150 TABLET ORAL ONCE
Qty: 2 TABLET | Refills: 0 | Status: SHIPPED | OUTPATIENT
Start: 2021-11-03 | End: 2021-11-03 | Stop reason: SDUPTHER

## 2021-11-05 LAB — BACTERIA UR CULT: NORMAL

## 2021-11-09 ENCOUNTER — OFFICE VISIT (OUTPATIENT)
Dept: FAMILY MEDICINE CLINIC | Facility: CLINIC | Age: 46
End: 2021-11-09
Payer: COMMERCIAL

## 2021-11-09 ENCOUNTER — APPOINTMENT (OUTPATIENT)
Dept: LAB | Facility: HOSPITAL | Age: 46
End: 2021-11-09
Payer: COMMERCIAL

## 2021-11-09 VITALS
HEIGHT: 66 IN | DIASTOLIC BLOOD PRESSURE: 76 MMHG | BODY MASS INDEX: 31.47 KG/M2 | OXYGEN SATURATION: 97 % | RESPIRATION RATE: 18 BRPM | TEMPERATURE: 98.6 F | HEART RATE: 86 BPM | SYSTOLIC BLOOD PRESSURE: 118 MMHG | WEIGHT: 195.8 LBS

## 2021-11-09 DIAGNOSIS — K62.5 RECTAL BLEEDING: ICD-10-CM

## 2021-11-09 DIAGNOSIS — K62.5 RECTAL BLEEDING: Primary | ICD-10-CM

## 2021-11-09 LAB
ALBUMIN SERPL BCP-MCNC: 3.4 G/DL (ref 3.5–5)
ALP SERPL-CCNC: 69 U/L (ref 46–116)
ALT SERPL W P-5'-P-CCNC: 19 U/L (ref 12–78)
ANION GAP SERPL CALCULATED.3IONS-SCNC: 11 MMOL/L (ref 4–13)
AST SERPL W P-5'-P-CCNC: 13 U/L (ref 5–45)
BASOPHILS # BLD AUTO: 0.1 THOUSANDS/ΜL (ref 0–0.1)
BASOPHILS NFR BLD AUTO: 1 % (ref 0–1)
BILIRUB SERPL-MCNC: 0.44 MG/DL (ref 0.2–1)
BUN SERPL-MCNC: 13 MG/DL (ref 5–25)
CALCIUM ALBUM COR SERPL-MCNC: 8.9 MG/DL (ref 8.3–10.1)
CALCIUM SERPL-MCNC: 8.4 MG/DL (ref 8.3–10.1)
CHLORIDE SERPL-SCNC: 105 MMOL/L (ref 100–108)
CO2 SERPL-SCNC: 26 MMOL/L (ref 21–32)
CREAT SERPL-MCNC: 0.94 MG/DL (ref 0.6–1.3)
EOSINOPHIL # BLD AUTO: 0.11 THOUSAND/ΜL (ref 0–0.61)
EOSINOPHIL NFR BLD AUTO: 1 % (ref 0–6)
ERYTHROCYTE [DISTWIDTH] IN BLOOD BY AUTOMATED COUNT: 12.8 % (ref 11.6–15.1)
GFR SERPL CREATININE-BSD FRML MDRD: 85 ML/MIN/1.73SQ M
GLUCOSE SERPL-MCNC: 105 MG/DL (ref 65–140)
HCT VFR BLD AUTO: 40.6 % (ref 34.8–46.1)
HGB BLD-MCNC: 14.3 G/DL (ref 11.5–15.4)
IMM GRANULOCYTES # BLD AUTO: 0.03 THOUSAND/UL (ref 0–0.2)
IMM GRANULOCYTES NFR BLD AUTO: 0 % (ref 0–2)
LYMPHOCYTES # BLD AUTO: 4.32 THOUSANDS/ΜL (ref 0.6–4.47)
LYMPHOCYTES NFR BLD AUTO: 45 % (ref 14–44)
MCH RBC QN AUTO: 32.9 PG (ref 26.8–34.3)
MCHC RBC AUTO-ENTMCNC: 35.2 G/DL (ref 31.4–37.4)
MCV RBC AUTO: 94 FL (ref 82–98)
MONOCYTES # BLD AUTO: 0.81 THOUSAND/ΜL (ref 0.17–1.22)
MONOCYTES NFR BLD AUTO: 8 % (ref 4–12)
NEUTROPHILS # BLD AUTO: 4.31 THOUSANDS/ΜL (ref 1.85–7.62)
NEUTS SEG NFR BLD AUTO: 45 % (ref 43–75)
NRBC BLD AUTO-RTO: 0 /100 WBCS
PLATELET # BLD AUTO: 556 THOUSANDS/UL (ref 149–390)
PMV BLD AUTO: 8.3 FL (ref 8.9–12.7)
POTASSIUM SERPL-SCNC: 4.1 MMOL/L (ref 3.5–5.3)
PROT SERPL-MCNC: 7.1 G/DL (ref 6.4–8.2)
RBC # BLD AUTO: 4.34 MILLION/UL (ref 3.81–5.12)
SODIUM SERPL-SCNC: 142 MMOL/L (ref 136–145)
TSH SERPL DL<=0.05 MIU/L-ACNC: 1.33 UIU/ML (ref 0.36–3.74)
WBC # BLD AUTO: 9.68 THOUSAND/UL (ref 4.31–10.16)

## 2021-11-09 PROCEDURE — 80053 COMPREHEN METABOLIC PANEL: CPT

## 2021-11-09 PROCEDURE — 85025 COMPLETE CBC W/AUTO DIFF WBC: CPT

## 2021-11-09 PROCEDURE — 36415 COLL VENOUS BLD VENIPUNCTURE: CPT

## 2021-11-09 PROCEDURE — 84443 ASSAY THYROID STIM HORMONE: CPT

## 2021-11-09 PROCEDURE — 99213 OFFICE O/P EST LOW 20 MIN: CPT | Performed by: NURSE PRACTITIONER

## 2021-11-10 ENCOUNTER — OFFICE VISIT (OUTPATIENT)
Dept: GASTROENTEROLOGY | Facility: CLINIC | Age: 46
End: 2021-11-10
Payer: COMMERCIAL

## 2021-11-10 VITALS
HEIGHT: 66 IN | SYSTOLIC BLOOD PRESSURE: 112 MMHG | BODY MASS INDEX: 31.5 KG/M2 | WEIGHT: 196 LBS | HEART RATE: 66 BPM | DIASTOLIC BLOOD PRESSURE: 82 MMHG

## 2021-11-10 DIAGNOSIS — K62.5 RECTAL BLEEDING: ICD-10-CM

## 2021-11-10 DIAGNOSIS — Z12.11 SCREENING FOR COLON CANCER: Primary | ICD-10-CM

## 2021-11-10 PROCEDURE — 99204 OFFICE O/P NEW MOD 45 MIN: CPT | Performed by: PHYSICIAN ASSISTANT

## 2021-11-30 ENCOUNTER — TELEPHONE (OUTPATIENT)
Dept: GASTROENTEROLOGY | Facility: HOSPITAL | Age: 46
End: 2021-11-30

## 2021-12-01 ENCOUNTER — ANESTHESIA (OUTPATIENT)
Dept: GASTROENTEROLOGY | Facility: HOSPITAL | Age: 46
End: 2021-12-01

## 2021-12-01 ENCOUNTER — ANESTHESIA EVENT (OUTPATIENT)
Dept: GASTROENTEROLOGY | Facility: HOSPITAL | Age: 46
End: 2021-12-01

## 2021-12-01 ENCOUNTER — HOSPITAL ENCOUNTER (OUTPATIENT)
Dept: GASTROENTEROLOGY | Facility: HOSPITAL | Age: 46
Setting detail: OUTPATIENT SURGERY
Discharge: HOME | End: 2021-12-01
Attending: INTERNAL MEDICINE
Payer: COMMERCIAL

## 2021-12-01 VITALS
OXYGEN SATURATION: 99 % | HEIGHT: 66 IN | SYSTOLIC BLOOD PRESSURE: 98 MMHG | DIASTOLIC BLOOD PRESSURE: 59 MMHG | BODY MASS INDEX: 30.51 KG/M2 | TEMPERATURE: 97.3 F | WEIGHT: 189.82 LBS | RESPIRATION RATE: 18 BRPM | HEART RATE: 80 BPM

## 2021-12-01 DIAGNOSIS — K62.5 RECTAL BLEEDING: ICD-10-CM

## 2021-12-01 PROCEDURE — 45378 DIAGNOSTIC COLONOSCOPY: CPT | Performed by: INTERNAL MEDICINE

## 2021-12-01 RX ORDER — SODIUM CHLORIDE, SODIUM LACTATE, POTASSIUM CHLORIDE, CALCIUM CHLORIDE 600; 310; 30; 20 MG/100ML; MG/100ML; MG/100ML; MG/100ML
125 INJECTION, SOLUTION INTRAVENOUS CONTINUOUS
Status: DISCONTINUED | OUTPATIENT
Start: 2021-12-01 | End: 2021-12-05 | Stop reason: HOSPADM

## 2021-12-01 RX ORDER — SODIUM CHLORIDE, SODIUM LACTATE, POTASSIUM CHLORIDE, CALCIUM CHLORIDE 600; 310; 30; 20 MG/100ML; MG/100ML; MG/100ML; MG/100ML
INJECTION, SOLUTION INTRAVENOUS CONTINUOUS PRN
Status: DISCONTINUED | OUTPATIENT
Start: 2021-12-01 | End: 2021-12-01

## 2021-12-01 RX ORDER — PROPOFOL 10 MG/ML
INJECTION, EMULSION INTRAVENOUS AS NEEDED
Status: DISCONTINUED | OUTPATIENT
Start: 2021-12-01 | End: 2021-12-01

## 2021-12-01 RX ADMIN — PROPOFOL 10 MG: 10 INJECTION, EMULSION INTRAVENOUS at 09:45

## 2021-12-01 RX ADMIN — SODIUM CHLORIDE, SODIUM LACTATE, POTASSIUM CHLORIDE, AND CALCIUM CHLORIDE: .6; .31; .03; .02 INJECTION, SOLUTION INTRAVENOUS at 09:20

## 2021-12-01 RX ADMIN — PROPOFOL 20 MG: 10 INJECTION, EMULSION INTRAVENOUS at 09:42

## 2021-12-01 RX ADMIN — PROPOFOL 100 MG: 10 INJECTION, EMULSION INTRAVENOUS at 09:35

## 2021-12-01 RX ADMIN — PROPOFOL 40 MG: 10 INJECTION, EMULSION INTRAVENOUS at 09:39

## 2021-12-02 ENCOUNTER — OFFICE VISIT (OUTPATIENT)
Dept: FAMILY MEDICINE CLINIC | Facility: CLINIC | Age: 46
End: 2021-12-02
Payer: COMMERCIAL

## 2021-12-02 DIAGNOSIS — R05.9 COUGH: Primary | ICD-10-CM

## 2021-12-02 DIAGNOSIS — H66.90 ACUTE OTITIS MEDIA, UNSPECIFIED OTITIS MEDIA TYPE: ICD-10-CM

## 2021-12-02 PROCEDURE — U0003 INFECTIOUS AGENT DETECTION BY NUCLEIC ACID (DNA OR RNA); SEVERE ACUTE RESPIRATORY SYNDROME CORONAVIRUS 2 (SARS-COV-2) (CORONAVIRUS DISEASE [COVID-19]), AMPLIFIED PROBE TECHNIQUE, MAKING USE OF HIGH THROUGHPUT TECHNOLOGIES AS DESCRIBED BY CMS-2020-01-R: HCPCS | Performed by: NURSE PRACTITIONER

## 2021-12-02 PROCEDURE — U0005 INFEC AGEN DETEC AMPLI PROBE: HCPCS | Performed by: NURSE PRACTITIONER

## 2021-12-02 PROCEDURE — 99213 OFFICE O/P EST LOW 20 MIN: CPT | Performed by: NURSE PRACTITIONER

## 2021-12-02 RX ORDER — CEPHALEXIN 500 MG/1
500 CAPSULE ORAL EVERY 6 HOURS SCHEDULED
Qty: 28 CAPSULE | Refills: 0 | Status: SHIPPED | OUTPATIENT
Start: 2021-12-02 | End: 2021-12-09

## 2021-12-02 RX ORDER — PREDNISONE 20 MG/1
40 TABLET ORAL DAILY
Qty: 10 TABLET | Refills: 0 | Status: SHIPPED | OUTPATIENT
Start: 2021-12-02 | End: 2021-12-07

## 2021-12-03 LAB — SARS-COV-2 RNA RESP QL NAA+PROBE: NEGATIVE

## 2021-12-29 ENCOUNTER — IMMUNIZATIONS (OUTPATIENT)
Dept: FAMILY MEDICINE CLINIC | Facility: HOSPITAL | Age: 46
End: 2021-12-29

## 2021-12-29 DIAGNOSIS — Z23 ENCOUNTER FOR IMMUNIZATION: Primary | ICD-10-CM

## 2021-12-29 PROCEDURE — 91306 COVID-19 MODERNA VACC 0.25 ML BOOSTER: CPT

## 2021-12-29 PROCEDURE — 0064A COVID-19 MODERNA VACC 0.25 ML BOOSTER: CPT

## 2022-02-01 ENCOUNTER — TELEPHONE (OUTPATIENT)
Dept: GASTROENTEROLOGY | Facility: CLINIC | Age: 47
End: 2022-02-01

## 2022-02-01 DIAGNOSIS — K64.9 HEMORRHOIDS, UNSPECIFIED HEMORRHOID TYPE: Primary | ICD-10-CM

## 2022-02-01 NOTE — TELEPHONE ENCOUNTER
She has hemorrhoidal bleeding (her colonoscopy was otherwise negative for any concerning findings - it just showed hemorrhoids)  She can use a Prep H suppository course and should add a fiber supplement like Benefiber to ensure her bowel movements are as regular as possible  If she continues to struggle with ongoing rectal bleeding despite the suppositories and fiber supplement, we can refer to a colorectal surgeon regarding further management/surgery of the hemorrhoids

## 2022-02-01 NOTE — TELEPHONE ENCOUNTER
Had colonoscopy in December since then was doing well, but as of today BM had blood in it, she has been taking meds as prescribed using suppository and is wondering if that normal or should she be concerned

## 2022-02-21 ENCOUNTER — APPOINTMENT (OUTPATIENT)
Dept: LAB | Facility: HOSPITAL | Age: 47
End: 2022-02-21
Payer: COMMERCIAL

## 2022-02-21 DIAGNOSIS — K62.5 RECTAL BLEEDING: ICD-10-CM

## 2022-02-21 LAB
BASOPHILS # BLD AUTO: 0.1 THOUSANDS/ΜL (ref 0–0.1)
BASOPHILS NFR BLD AUTO: 1 % (ref 0–1)
EOSINOPHIL # BLD AUTO: 0.06 THOUSAND/ΜL (ref 0–0.61)
EOSINOPHIL NFR BLD AUTO: 1 % (ref 0–6)
ERYTHROCYTE [DISTWIDTH] IN BLOOD BY AUTOMATED COUNT: 12.9 % (ref 11.6–15.1)
HCT VFR BLD AUTO: 45.6 % (ref 34.8–46.1)
HGB BLD-MCNC: 16.3 G/DL (ref 11.5–15.4)
IMM GRANULOCYTES # BLD AUTO: 0.08 THOUSAND/UL (ref 0–0.2)
IMM GRANULOCYTES NFR BLD AUTO: 1 % (ref 0–2)
LYMPHOCYTES # BLD AUTO: 6.51 THOUSANDS/ΜL (ref 0.6–4.47)
LYMPHOCYTES NFR BLD AUTO: 51 % (ref 14–44)
MCH RBC QN AUTO: 32.5 PG (ref 26.8–34.3)
MCHC RBC AUTO-ENTMCNC: 35.7 G/DL (ref 31.4–37.4)
MCV RBC AUTO: 91 FL (ref 82–98)
MONOCYTES # BLD AUTO: 1.03 THOUSAND/ΜL (ref 0.17–1.22)
MONOCYTES NFR BLD AUTO: 8 % (ref 4–12)
NEUTROPHILS # BLD AUTO: 4.74 THOUSANDS/ΜL (ref 1.85–7.62)
NEUTS SEG NFR BLD AUTO: 38 % (ref 43–75)
NRBC BLD AUTO-RTO: 0 /100 WBCS
PLATELET # BLD AUTO: 588 THOUSANDS/UL (ref 149–390)
PMV BLD AUTO: 9.3 FL (ref 8.9–12.7)
RBC # BLD AUTO: 5.02 MILLION/UL (ref 3.81–5.12)
WBC # BLD AUTO: 12.52 THOUSAND/UL (ref 4.31–10.16)

## 2022-02-21 PROCEDURE — 36415 COLL VENOUS BLD VENIPUNCTURE: CPT

## 2022-02-21 PROCEDURE — 85025 COMPLETE CBC W/AUTO DIFF WBC: CPT

## 2022-04-01 ENCOUNTER — OFFICE VISIT (OUTPATIENT)
Dept: FAMILY MEDICINE CLINIC | Facility: CLINIC | Age: 47
End: 2022-04-01
Payer: COMMERCIAL

## 2022-04-01 VITALS — HEIGHT: 66 IN | OXYGEN SATURATION: 97 % | BODY MASS INDEX: 32.47 KG/M2 | HEART RATE: 83 BPM | WEIGHT: 202 LBS

## 2022-04-01 DIAGNOSIS — F41.9 ANXIETY: ICD-10-CM

## 2022-04-01 DIAGNOSIS — T14.8XXA MUSCLE STRAIN: ICD-10-CM

## 2022-04-01 DIAGNOSIS — F32.1 MODERATE MAJOR DEPRESSION, SINGLE EPISODE (HCC): ICD-10-CM

## 2022-04-01 DIAGNOSIS — F33.9 DEPRESSION, RECURRENT (HCC): Primary | ICD-10-CM

## 2022-04-01 PROCEDURE — 99214 OFFICE O/P EST MOD 30 MIN: CPT | Performed by: NURSE PRACTITIONER

## 2022-04-01 RX ORDER — BUSPIRONE HYDROCHLORIDE 10 MG/1
10 TABLET ORAL 3 TIMES DAILY
Qty: 270 TABLET | Refills: 1 | Status: SHIPPED | OUTPATIENT
Start: 2022-04-01

## 2022-04-01 RX ORDER — FLUOXETINE HYDROCHLORIDE 40 MG/1
40 CAPSULE ORAL DAILY
Qty: 90 CAPSULE | Refills: 1 | Status: SHIPPED | OUTPATIENT
Start: 2022-04-01

## 2022-04-01 RX ORDER — CYCLOBENZAPRINE HCL 5 MG
5 TABLET ORAL
Qty: 90 TABLET | Refills: 1 | Status: SHIPPED | OUTPATIENT
Start: 2022-04-01 | End: 2022-05-17 | Stop reason: SDUPTHER

## 2022-04-01 RX ORDER — BUPROPION HYDROCHLORIDE 150 MG/1
150 TABLET, EXTENDED RELEASE ORAL 2 TIMES DAILY
Qty: 180 TABLET | Refills: 1 | Status: SHIPPED | OUTPATIENT
Start: 2022-04-01

## 2022-04-01 NOTE — PROGRESS NOTES
Assessment/Plan:     Chronic Problems: Moderate major depression, single episode ([facility] )  Will continue Wellbutrin twice daily  Will increase fluoxetine to 40 mg daily  Anxiety  Anxiety is very uncontrolled currently  Will continue Wellbutrin  Will increase fluoxetine to 40 mg daily  Continue BuSpar  Advised to return to therapy  Visit Diagnosis:  Diagnoses and all orders for this visit:    Depression, recurrent ([facility] )    Moderate major depression, single episode ([facility])  -     buPROPion (WELLBUTRIN SR) 150 mg 12 hr tablet; Take 1 tablet (150 mg total) by mouth 2 (two) times a day    Anxiety  -     busPIRone (BUSPAR) 10 mg tablet; Take 1 tablet (10 mg total) by mouth 3 (three) times a day  -     FLUoxetine (PROzac) 40 MG capsule; Take 1 capsule (40 mg total) by mouth daily    Muscle strain  -     cyclobenzaprine (FLEXERIL) 5 mg tablet; Take 1 tablet (5 mg total) by mouth daily at bedtime          Subjective:    Patient ID: Seb Mata is a 40 y o  female  Patient presents for f/u on anxiety medications  She feels very anxious recently due to stressors at work  She feels she is having issues with a coworker and is extremely anxious at work  Denies depression  She is not sleeping well, but does not want any sleeping meds  The following portions of the patient's history were reviewed and updated as appropriate: allergies, current medications, past family history, past medical history, past social history, past surgical history and problem list     Review of Systems   Constitutional: Negative for chills and fever  HENT: Negative for ear pain and sore throat  Eyes: Negative for pain and visual disturbance  Respiratory: Negative for cough and shortness of breath  Cardiovascular: Negative for chest pain and palpitations  Gastrointestinal: Negative for abdominal pain and vomiting  Genitourinary: Negative for dysuria and hematuria  Musculoskeletal: Negative for arthralgias and back pain  Skin: Negative for color change and rash  Neurological: Negative for seizures and syncope  Psychiatric/Behavioral: Positive for sleep disturbance  Negative for dysphoric mood  The patient is nervous/anxious  All other systems reviewed and are negative          Pulse 83   Ht 5' 6" (1 676 m)   Wt 91 6 kg (202 lb)   SpO2 97%   BMI 32 60 kg/m²   Social History     Socioeconomic History    Marital status: /Civil Union     Spouse name: Not on file    Number of children: Not on file    Years of education: Not on file    Highest education level: Not on file   Occupational History    Not on file   Tobacco Use    Smoking status: Never Smoker    Smokeless tobacco: Never Used   Vaping Use    Vaping Use: Never used   Substance and Sexual Activity    Alcohol use: Yes     Comment: super rare    Drug use: No    Sexual activity: Yes     Partners: Male     Birth control/protection: Surgical   Other Topics Concern    Not on file   Social History Narrative    Not on file     Social Determinants of Health     Financial Resource Strain: Not on file   Food Insecurity: Not on file   Transportation Needs: Not on file   Physical Activity: Not on file   Stress: Not on file   Social Connections: Not on file   Intimate Partner Violence: Not on file   Housing Stability: Not on file     Past Medical History:   Diagnosis Date    Anxiety     Depression     Fibroid     Head injury     [facility] )     Moderate major depression, single episode ([facility] ) 4/30/2021     Family History   Problem Relation Age of Onset    Arthritis Mother     Hypertension Family     ADD / ADHD Son     Learning disabilities Son     Breast cancer Neg Hx     Colon cancer Neg Hx     Ovarian cancer Neg Hx     Uterine cancer Neg Hx     Cervical cancer Neg Hx      Past Surgical History:   Procedure Laterality Date    HYSTERECTOMY N/A     SPLENECTOMY      WISDOM TOOTH EXTRACTION         Current Outpatient Medications:   Biotin 1000 MCG tablet, Take 1,000 mcg by mouth daily, Disp: , Rfl:     buPROPion (WELLBUTRIN SR) 150 mg 12 hr tablet, Take 1 tablet (150 mg total) by mouth 2 (two) times a day, Disp: 180 tablet, Rfl: 1    busPIRone (BUSPAR) 10 mg tablet, Take 1 tablet (10 mg total) by mouth 3 (three) times a day, Disp: 270 tablet, Rfl: 1    Cholecalciferol (VITAMIN D3) 5000 units CAPS, Take by mouth, Disp: , Rfl:     clotrimazole-betamethasone (LOTRISONE) 1-0 05 % cream, Apply topically 2 (two) times a day prn, Disp: 45 g, Rfl: 1    cyclobenzaprine (FLEXERIL) 5 mg tablet, Take 1 tablet (5 mg total) by mouth daily at bedtime, Disp: 90 tablet, Rfl: 1    EVENING PRIMROSE OIL PO, Take by mouth, Disp: , Rfl:     FLUoxetine (PROzac) 40 MG capsule, Take 1 capsule (40 mg total) by mouth daily, Disp: 90 capsule, Rfl: 1    fluticasone (FLONASE) 50 mcg/act nasal spray, 2 sprays into each nostril daily, Disp: 16 g, Rfl: 3    FOLIC ACID PO, Take by mouth, Disp: , Rfl:     multivitamin (THERAGRAN) TABS, Take 1 tablet by mouth daily, Disp: , Rfl:     Nerve Stimulator (Pro Comfort Tens Unit) JOSIAH, 1 Device by Does not apply route daily, Disp: 1 Device, Rfl: 0    simethicone (MYLICON,GAS-X) 477 MG capsule, Take 1 capsule (180 mg total) by mouth 2 (two) times a day, Disp: 20 capsule, Rfl: 0    Allergies   Allergen Reactions    Latex Hives and Rash     Contact-GLOVES    Shellfish Allergy - Food Allergy Hives, Rash and Throat Swelling          Lab Review   Appointment on 02/21/2022   Component Date Value    WBC 02/21/2022 12 52*    RBC 02/21/2022 5 02     Hemoglobin 02/21/2022 16 3*    Hematocrit 02/21/2022 45 6     MCV 02/21/2022 91     MCH 02/21/2022 32 5     MCHC 02/21/2022 35 7     RDW 02/21/2022 12 9     MPV 02/21/2022 9 3     Platelets 15/18/2594 588*    nRBC 02/21/2022 0     Neutrophils Relative 02/21/2022 38*    Immat GRANS % 02/21/2022 1     Lymphocytes Relative 02/21/2022 51*    Monocytes Relative 02/21/2022 8     Eosinophils Relative 02/21/2022 1     Basophils Relative 02/21/2022 1     Neutrophils Absolute 02/21/2022 4 74     Immature Grans Absolute 02/21/2022 0 08     Lymphocytes Absolute 02/21/2022 6 51*    Monocytes Absolute 02/21/2022 1 03     Eosinophils Absolute 02/21/2022 0 06     Basophils Absolute 02/21/2022 0 10         Imaging: No results found  Objective:     Physical Exam  Constitutional:       Appearance: She is well-developed  Cardiovascular:      Rate and Rhythm: Normal rate and regular rhythm  Heart sounds: Normal heart sounds  No murmur heard  Pulmonary:      Effort: Pulmonary effort is normal  No respiratory distress  Breath sounds: Normal breath sounds  Skin:     General: Skin is warm and dry  Neurological:      Mental Status: She is alert and oriented to person, place, and time  There are no Patient Instructions on file for this visit  BRITTANY Looney    Portions of the record may have been created with voice recognition software  Occasional wrong word or "sound a like" substitutions may have occurred due to the inherent limitations of voice recognition software  Read the chart carefully and recognize, using context, where substitutions have occurred

## 2022-04-01 NOTE — ASSESSMENT & PLAN NOTE
Anxiety is very uncontrolled currently  Will continue Wellbutrin  Will increase fluoxetine to 40 mg daily  Continue BuSpar  Advised to return to therapy

## 2022-05-17 DIAGNOSIS — T14.8XXA MUSCLE STRAIN: ICD-10-CM

## 2022-05-17 DIAGNOSIS — E66.09 CLASS 1 OBESITY DUE TO EXCESS CALORIES WITHOUT SERIOUS COMORBIDITY WITH BODY MASS INDEX (BMI) OF 32.0 TO 32.9 IN ADULT: ICD-10-CM

## 2022-05-17 RX ORDER — PHENTERMINE HYDROCHLORIDE 30 MG/1
30 CAPSULE ORAL EVERY MORNING
Qty: 30 CAPSULE | Refills: 0 | Status: SHIPPED | OUTPATIENT
Start: 2022-05-17 | End: 2022-06-23 | Stop reason: SDUPTHER

## 2022-05-17 RX ORDER — CYCLOBENZAPRINE HCL 5 MG
5 TABLET ORAL
Qty: 90 TABLET | Refills: 1 | Status: SHIPPED | OUTPATIENT
Start: 2022-05-17 | End: 2022-06-23 | Stop reason: SDUPTHER

## 2022-05-26 DIAGNOSIS — K64.9 HEMORRHOIDS, UNSPECIFIED HEMORRHOID TYPE: Primary | ICD-10-CM

## 2022-05-26 RX ORDER — HYDROCORTISONE 25 MG/G
CREAM TOPICAL 2 TIMES DAILY
Qty: 28 G | Refills: 0 | Status: SHIPPED | OUTPATIENT
Start: 2022-05-26 | End: 2023-01-19

## 2022-06-23 DIAGNOSIS — E66.09 CLASS 1 OBESITY DUE TO EXCESS CALORIES WITHOUT SERIOUS COMORBIDITY WITH BODY MASS INDEX (BMI) OF 32.0 TO 32.9 IN ADULT: ICD-10-CM

## 2022-06-23 DIAGNOSIS — T14.8XXA MUSCLE STRAIN: ICD-10-CM

## 2022-06-23 RX ORDER — PHENTERMINE HYDROCHLORIDE 30 MG/1
30 CAPSULE ORAL EVERY MORNING
Qty: 30 CAPSULE | Refills: 0 | Status: SHIPPED | OUTPATIENT
Start: 2022-06-23 | End: 2022-07-29 | Stop reason: SDUPTHER

## 2022-06-23 RX ORDER — CYCLOBENZAPRINE HCL 5 MG
5 TABLET ORAL
Qty: 90 TABLET | Refills: 1 | Status: SHIPPED | OUTPATIENT
Start: 2022-06-23 | End: 2022-11-09 | Stop reason: SDUPTHER

## 2022-07-29 DIAGNOSIS — E66.09 CLASS 1 OBESITY DUE TO EXCESS CALORIES WITHOUT SERIOUS COMORBIDITY WITH BODY MASS INDEX (BMI) OF 32.0 TO 32.9 IN ADULT: ICD-10-CM

## 2022-07-29 RX ORDER — PHENTERMINE HYDROCHLORIDE 30 MG/1
30 CAPSULE ORAL EVERY MORNING
Qty: 30 CAPSULE | Refills: 0 | Status: SHIPPED | OUTPATIENT
Start: 2022-07-29 | End: 2022-09-02 | Stop reason: SDUPTHER

## 2022-08-08 ENCOUNTER — APPOINTMENT (OUTPATIENT)
Dept: LAB | Facility: CLINIC | Age: 47
End: 2022-08-08

## 2022-08-08 DIAGNOSIS — Z00.8 OTHER SPECIFIED GENERAL MEDICAL EXAMINATION: ICD-10-CM

## 2022-08-08 LAB
CHOLEST SERPL-MCNC: 169 MG/DL
EST. AVERAGE GLUCOSE BLD GHB EST-MCNC: 97 MG/DL
HBA1C MFR BLD: 5 %
HDLC SERPL-MCNC: 55 MG/DL
LDLC SERPL CALC-MCNC: 96 MG/DL (ref 0–100)
NONHDLC SERPL-MCNC: 114 MG/DL
TRIGL SERPL-MCNC: 91 MG/DL

## 2022-08-08 PROCEDURE — 36415 COLL VENOUS BLD VENIPUNCTURE: CPT

## 2022-08-08 PROCEDURE — 80061 LIPID PANEL: CPT

## 2022-08-08 PROCEDURE — 83036 HEMOGLOBIN GLYCOSYLATED A1C: CPT

## 2022-09-02 DIAGNOSIS — E66.09 CLASS 1 OBESITY DUE TO EXCESS CALORIES WITHOUT SERIOUS COMORBIDITY WITH BODY MASS INDEX (BMI) OF 32.0 TO 32.9 IN ADULT: ICD-10-CM

## 2022-09-02 RX ORDER — PHENTERMINE HYDROCHLORIDE 30 MG/1
30 CAPSULE ORAL EVERY MORNING
Qty: 30 CAPSULE | Refills: 0 | Status: SHIPPED | OUTPATIENT
Start: 2022-09-02 | End: 2022-10-21 | Stop reason: SDUPTHER

## 2022-10-21 DIAGNOSIS — E66.09 CLASS 1 OBESITY DUE TO EXCESS CALORIES WITHOUT SERIOUS COMORBIDITY WITH BODY MASS INDEX (BMI) OF 32.0 TO 32.9 IN ADULT: ICD-10-CM

## 2022-10-21 RX ORDER — PHENTERMINE HYDROCHLORIDE 30 MG/1
30 CAPSULE ORAL EVERY MORNING
Qty: 30 CAPSULE | Refills: 0 | Status: SHIPPED | OUTPATIENT
Start: 2022-10-21 | End: 2023-07-03

## 2022-11-03 NOTE — PROGRESS NOTES
Patient presents for: yearly    Menarche- 13  Last Pap Smear- 03/14/2018  Hx of abnormal paps-never  Birth control- Hysterectomy     Mammogram- 11/04/2020  DXA- not on file   Colonoscopy-12/01/21    Smoking status-never  Last sexual activity- yes  Family history of uterine, ovarian, cervical or breast cancer-    Concerns- Still having issues with hemorrhoids  Has some spotting during intercourse   Happens once in awhile

## 2022-11-07 ENCOUNTER — ANNUAL EXAM (OUTPATIENT)
Dept: OBGYN CLINIC | Facility: CLINIC | Age: 47
End: 2022-11-07

## 2022-11-07 VITALS
WEIGHT: 192.6 LBS | DIASTOLIC BLOOD PRESSURE: 80 MMHG | BODY MASS INDEX: 30.95 KG/M2 | HEIGHT: 66 IN | SYSTOLIC BLOOD PRESSURE: 104 MMHG

## 2022-11-07 DIAGNOSIS — Z90.710 H/O: HYSTERECTOMY: ICD-10-CM

## 2022-11-07 DIAGNOSIS — Z12.31 SCREENING MAMMOGRAM FOR BREAST CANCER: ICD-10-CM

## 2022-11-07 DIAGNOSIS — Z01.419 ENCOUNTER FOR GYNECOLOGICAL EXAMINATION WITHOUT ABNORMAL FINDING: Primary | ICD-10-CM

## 2022-11-07 DIAGNOSIS — K64.9 HEMORRHOIDS, UNSPECIFIED HEMORRHOID TYPE: ICD-10-CM

## 2022-11-07 NOTE — PATIENT INSTRUCTIONS
Breast Self Exam for Women   AMBULATORY CARE:   A breast self-exam (BSE)  is a way to check your breasts for lumps and other changes  Regular BSEs can help you know how your breasts normally look and feel  Most breast lumps or changes are not cancer, but you should always have them checked by a healthcare provider  Why you should do a BSE:  Breast cancer is the most common type of cancer in women  Even if you have mammograms, you may still want to do a BSE regularly  If you know how your breasts normally feel and look, it may help you know when to contact your healthcare provider  Mammograms can miss some cancers  You may find a lump during a BSE that did not show up on a mammogram   When you should do a BSE:  If you have periods, you may want to do your BSE 1 week after your period ends  This is the time when your breasts may be the least swollen, lumpy, or tender  You can do regular BSEs even if you are breastfeeding or have breast implants  Call your doctor if:   You find any lumps or changes in your breasts  You have breast pain or fluid coming from your nipples  You have questions or concerns about your condition or care  How to do a BSE:       Look at your breasts in a mirror  Look at the size and shape of each breast and nipple  Check for swelling, lumps, dimpling, scaly skin, or other skin changes  Look for nipple changes, such as a nipple that is painful or beginning to pull inward  Gently squeeze both nipples and check to see if fluid (that is not breast milk) comes out of them  If you find any of these or other breast changes, contact your healthcare provider  Check your breasts while you sit or  the following 3 positions:    Kress your arms down at your sides  Raise your hands and join them behind your head  Put firm pressure with your hands on your hips  Bend slightly forward while you look at your breasts in the mirror  Lie down and feel your breasts    When you lie down, your breast tissue spreads out evenly over your chest  This makes it easier for you to feel for lumps and anything that may not be normal for your breasts  Do a BSE on one breast at a time  Place a small pillow or towel under your left shoulder  Put your left arm behind your head  Use the 3 middle fingers of your right hand  Use your fingertip pads, on the top of your fingers  Your fingertip pad is the most sensitive part of your finger  Use small circles to feel your breast tissue  Use your fingertip pads to make dime-sized, overlapping circles on your breast and armpits  Use light, medium, and firm pressure  First, press lightly  Second, press with medium pressure to feel a little deeper into the breast  Last, use firm pressure to feel deep within your breast     Examine your entire breast area  Examine the breast area from above the breast to below the breast where you feel only ribs  Make small circles with your fingertips, starting in the middle of your armpit  Make circles going up and down the breast area  Continue toward your breast and all the way across it  Examine the area from your armpit all the way over to the middle of your chest (breastbone)  Stop at the middle of your chest     Move the pillow or towel to your right shoulder, and put your right arm behind your head  Use the 3 fingertip pads of your left hand, and repeat the above steps to do a BSE on your right breast     What else you can do to check for breast problems or cancer:  Talk to your healthcare provider about mammograms  A mammogram is an x-ray of your breasts to screen for breast cancer or other problems  Your provider can tell you the benefits and risks of mammograms  The first mammogram is usually at age 39 or 48  Your provider may recommend you start at 36 or younger if your risk for breast cancer is high  Mammograms usually continue every 1 to 2 years until age 76         Follow up with your doctor as directed:  Write down your questions so you remember to ask them during your visits  © Copyright [employer] 2022 Information is for End User's use only and may not be sold, redistributed or otherwise used for commercial purposes  All illustrations and images included in CareNotes® are the copyrighted property of A D A M , Inc  or Bhavesh Metz  The above information is an  only  It is not intended as medical advice for individual conditions or treatments  Talk to your doctor, nurse or pharmacist before following any medical regimen to see if it is safe and effective for you
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
## 2022-11-09 DIAGNOSIS — T14.8XXA MUSCLE STRAIN: ICD-10-CM

## 2022-11-09 RX ORDER — CYCLOBENZAPRINE HCL 5 MG
5 TABLET ORAL
Qty: 90 TABLET | Refills: 0 | Status: SHIPPED | OUTPATIENT
Start: 2022-11-09

## 2022-11-11 ENCOUNTER — CLINICAL SUPPORT (OUTPATIENT)
Dept: FAMILY MEDICINE CLINIC | Facility: CLINIC | Age: 47
End: 2022-11-11

## 2022-11-11 DIAGNOSIS — R11.2 NAUSEA AND VOMITING, UNSPECIFIED VOMITING TYPE: Primary | ICD-10-CM

## 2022-11-11 DIAGNOSIS — R19.7 DIARRHEA, UNSPECIFIED TYPE: ICD-10-CM

## 2022-11-12 LAB
FLUAV RNA RESP QL NAA+PROBE: NEGATIVE
FLUBV RNA RESP QL NAA+PROBE: NEGATIVE
RSV RNA RESP QL NAA+PROBE: NEGATIVE
SARS-COV-2 RNA RESP QL NAA+PROBE: NEGATIVE

## 2022-11-18 ENCOUNTER — OFFICE VISIT (OUTPATIENT)
Dept: OBGYN CLINIC | Facility: CLINIC | Age: 47
End: 2022-11-18

## 2022-11-18 VITALS
BODY MASS INDEX: 29.41 KG/M2 | SYSTOLIC BLOOD PRESSURE: 112 MMHG | WEIGHT: 183 LBS | RESPIRATION RATE: 18 BRPM | HEART RATE: 80 BPM | HEIGHT: 66 IN | DIASTOLIC BLOOD PRESSURE: 68 MMHG | OXYGEN SATURATION: 96 %

## 2022-11-18 DIAGNOSIS — G56.02 CARPAL TUNNEL SYNDROME ON LEFT: Primary | ICD-10-CM

## 2022-11-18 RX ADMIN — METHYLPREDNISOLONE ACETATE 1 ML: 40 INJECTION, SUSPENSION INTRA-ARTICULAR; INTRALESIONAL; INTRAMUSCULAR; SOFT TISSUE at 10:08

## 2022-11-18 RX ADMIN — LIDOCAINE HYDROCHLORIDE 1 ML: 10 INJECTION, SOLUTION INFILTRATION; PERINEURAL at 10:08

## 2022-11-18 NOTE — PROGRESS NOTES
HPI:  Patient is a 56y o  year old  female who presents with chief complaint of left wrist pain and numbness  She reports it started approximately 6 months ago, and has been getting progressively worse  Her symptoms are worse within her first 3 fingers, she has decreased  strength  She takes tylenol with minimal symptom relief       ROS:   General: No fever, no chills, no weight loss, no weight gain  HEENT:  No loss of hearing, no nose bleeds, no sore throat  Eyes:  No eye pain, no red eyes, no visual disturbance  Respiratory:  No cough, no shortness of breath, no wheezing  Cardiovascular:  No chest pain, no palpitations, no edema  GI: No abdominal pain, no nausea, no vomiting  Endocrine: No frequent urination, no excessive thirst  Urinary:  No dysuria, no hematuria, no incontinence  Musculoskeletal: see HPI and PE  Skin:  No rash, no wounds  Neurological:  No dizziness, no headache, no numbness  Psychiatric:  No difficulty concentrating, no depression, no suicide thoughts, no anxiety  Review of all other systems is negative    PMH:  Past Medical History:   Diagnosis Date   • Anxiety    • Depression    • Fibroid    • Head injury    • Hereditary spherocytosis ([facility] [address] )    • Moderate major depression, single episode ([facility] [address] ) 4/30/2021       PSH:  Past Surgical History:   Procedure Laterality Date   • HYSTERECTOMY N/A    • SPLENECTOMY     • WISDOM TOOTH EXTRACTION         Medications:  Current Outpatient Medications   Medication Sig Dispense Refill   • Biotin 1000 MCG tablet Take 1,000 mcg by mouth daily     • buPROPion (WELLBUTRIN SR) 150 mg 12 hr tablet Take 1 tablet (150 mg total) by mouth 2 (two) times a day 180 tablet 1   • Cholecalciferol (VITAMIN D3) 5000 units CAPS Take by mouth     • clotrimazole-betamethasone (LOTRISONE) 1-0 05 % cream Apply topically 2 (two) times a day prn 45 g 1   • cyclobenzaprine (FLEXERIL) 5 mg tablet Take 1 tablet (5 mg total) by mouth daily at bedtime 90 tablet 0   • EVENING PRIMROSE OIL PO Take by mouth     • FLUoxetine (PROzac) 40 MG capsule Take 1 capsule (40 mg total) by mouth daily 90 capsule 1   • fluticasone (FLONASE) 50 mcg/act nasal spray 2 sprays into each nostril daily 16 g 3   • FOLIC ACID PO Take by mouth     • hydrocortisone (ANUSOL-HC) 2 5 % rectal cream Apply topically 2 (two) times a day 28 g 0   • multivitamin (THERAGRAN) TABS Take 1 tablet by mouth daily     • Nerve Stimulator (Pro Comfort Tens Unit) JOSIAH 1 Device by Does not apply route daily 1 Device 0   • phentermine 30 MG capsule Take 1 capsule (30 mg total) by mouth every morning 30 capsule 0   • simethicone (MYLICON,GAS-X) 176 MG capsule Take 1 capsule (180 mg total) by mouth 2 (two) times a day 20 capsule 0     No current facility-administered medications for this visit  Allergies: Allergies   Allergen Reactions   • Latex Hives and Rash     Contact-GLOVES   • Shellfish Allergy - Food Allergy Hives, Rash and Throat Swelling       Family History:  Family History   Problem Relation Age of Onset   • Arthritis Mother    • Hypertension Family    • ADD / ADHD Son    • Learning disabilities Son    • Breast cancer Neg Hx    • Colon cancer Neg Hx    • Ovarian cancer Neg Hx    • Uterine cancer Neg Hx    • Cervical cancer Neg Hx        Social History:  Social History     Occupational History   • Not on file   Tobacco Use   • Smoking status: Never   • Smokeless tobacco: Never   Vaping Use   • Vaping Use: Never used   Substance and Sexual Activity   • Alcohol use: Yes     Comment: super rare   • Drug use: No   • Sexual activity: Yes     Partners: Male     Birth control/protection: Surgical       Physical Exam:  General :  Alert, cooperative, no distress, appears stated age  Blood pressure 112/68, pulse 80, resp  rate 18, height 5' 6" (1 676 m), weight 83 kg (183 lb), SpO2 96 %, not currently breastfeeding     Head:  Normocephalic, without obvious abnormality, atraumatic   Eyes:  Conjunctiva/corneas clear, EOM's intact,   Ears: Both ears normal appearance, no hearing deficits  Nose: Nares normal, septum midline, no drainage    Neck: Supple,  trachea midline, no adenopathy, no tenderness, no mass   Back:   Symmetric, no curvature, ROM normal, no tenderness   Lungs:   Respirations unlabored   Chest Wall:  No tenderness or deformity   Extremities: Extremities normal, atraumatic, no cyanosis or edema      Pulses: 2+ and symmetric   Skin: Skin color, texture, turgor normal, no rashes or lesions      Neurologic: Normal           Left Hand Exam     Tenderness   The patient is experiencing no tenderness  Range of Motion   The patient has normal left wrist ROM  Muscle Strength   The patient has normal left wrist strength  Wrist extension: 5/5   Wrist flexion: 5/5   :  5/5     Tests   Phalen’s Sign: negative  Tinel's sign (median nerve): positive    Other   Erythema: absent  Scars: absent  Sensation: normal  Pulse: present          Hand/upper extremity injection: L carpal tunnel  [place] Protocol:  Consent: Verbal consent obtained  Risks and benefits: risks, benefits and alternatives were discussed  Consent given by: patient  Patient understanding: patient states understanding of the procedure being performed  Patient consent: the patient's understanding of the procedure matches consent given  Patient identity confirmed: verbally with patient    Supporting Documentation  Indications: pain   Procedure Details  Condition:carpal tunnel syndrome Site: L carpal tunnel   Preparation: Patient was prepped and draped in the usual sterile fashion  Needle size: 22 G  Ultrasound guidance: no  Approach: volar  Medications administered: 1 mL lidocaine 1 %; 1 mL methylPREDNISolone acetate 40 mg/mL    Patient tolerance: patient tolerated the procedure well with no immediate complications  Dressing:  Sterile dressing applied         Imaging Studies: The following imaging studies were reviewed in office today  My findings are noted    No imaging to review  Assessment  Encounter Diagnosis   Name Primary? • Carpal tunnel syndrome on left Yes         Aleta Miles is a 56 y o  RHD female who presents with left carpal tunnel syndrome  · I offered to preform a CSI to the left wrist to alleviate her symptoms  Patient consented and underwent the procedure without issues or complications  · Left wrist cock-up brace provided to the patient to day to be worn when sleeping and during activities that exacerbate her symptoms     · EMG order to evaluate median nerve    Scribe Attestation    I,:  [employer] am acting as a scribe while in the presence of the attending physician :       I,:  Ellis Jones MD personally performed the services described in this documentation    as scribed in my presence :

## 2022-12-06 ENCOUNTER — TELEPHONE (OUTPATIENT)
Dept: SURGERY | Facility: CLINIC | Age: 47
End: 2022-12-06

## 2022-12-06 NOTE — TELEPHONE ENCOUNTER
I left detailed message for patient to call back regarding a change in her appt  Time for 12/23 as Dr Joetta Halsted can only see patient at either 8am or 830am   Asked patient to call back and ask for [facility]  Thank you

## 2022-12-12 RX ORDER — LIDOCAINE HYDROCHLORIDE 10 MG/ML
1 INJECTION, SOLUTION INFILTRATION; PERINEURAL
Status: COMPLETED | OUTPATIENT
Start: 2022-11-18 | End: 2022-11-18

## 2022-12-12 RX ORDER — METHYLPREDNISOLONE ACETATE 40 MG/ML
1 INJECTION, SUSPENSION INTRA-ARTICULAR; INTRALESIONAL; INTRAMUSCULAR; SOFT TISSUE
Status: COMPLETED | OUTPATIENT
Start: 2022-11-18 | End: 2022-11-18

## 2022-12-21 ENCOUNTER — CONSULT (OUTPATIENT)
Dept: SURGERY | Facility: CLINIC | Age: 47
End: 2022-12-21

## 2022-12-21 VITALS
SYSTOLIC BLOOD PRESSURE: 118 MMHG | OXYGEN SATURATION: 97 % | DIASTOLIC BLOOD PRESSURE: 70 MMHG | TEMPERATURE: 98.4 F | HEART RATE: 87 BPM | WEIGHT: 190 LBS | BODY MASS INDEX: 30.53 KG/M2 | HEIGHT: 66 IN | RESPIRATION RATE: 15 BRPM

## 2022-12-21 DIAGNOSIS — K64.9 HEMORRHOIDS, UNSPECIFIED HEMORRHOID TYPE: ICD-10-CM

## 2022-12-21 NOTE — PROGRESS NOTES
Assessment/Plan:     1  Hemorrhoids, unspecified hemorrhoid type  -     Ambulatory referral to General Surgery       We discussed the risks and benefits of hemorrhoidectomy  Risks not limited to bleeding, infection, pain, recurrence  Consent signed  Subjective:      Patient ID: Saima Giron is a 56 y o  female  Triage Notes:    Ms Cathy Miguel is presenting with hemorrhoids  Has been diagnosed with both internal and external hemorrhoids  Only symptom is bleeding  Is taking a stool softener and goes every morning  No pain or prolapse  Hysterectomy and c section (no history of vaginal delivery)  Splenectomy age 16  The following portions of the patient's history were reviewed and updated as appropriate:   She  has a past medical history of Anxiety, Depression, Fibroid, Head injury, Hereditary spherocytosis ([facility] [address] ), and Moderate major depression, single episode ([facility] [address] ) (4/30/2021)  She   Patient Active Problem List    Diagnosis Date Noted   • Depression, recurrent ([facility] [address] ) 04/01/2022   • Anxiety 08/03/2021   • Acute superficial gastritis without hemorrhage 05/05/2021   • Moderate major depression, single episode ([facility] [address] ) 04/30/2021   • Panic attacks 04/30/2021   • Globus pharyngeus 01/27/2021   • BMI 31 0-31 9,adult 09/11/2020   • H/O: hysterectomy 03/27/2020   • SAL (obstructive sleep apnea)    • 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   • Hereditary spherocytosis ([facility] [address] ) 04/07/2009     She  has a past surgical history that includes Hysterectomy (N/A); Splenectomy; and [place] tooth extraction  Her family history includes ADD / ADHD in her son; Arthritis in her mother; Hypertension in her family; Learning disabilities in her son  She  reports that she has never smoked  She has never used smokeless tobacco  She reports current alcohol use  She reports that she does not use drugs    Current Outpatient Medications on File Prior to Visit   Medication Sig   • Biotin 1000 MCG tablet Take 1,000 mcg by mouth daily   • buPROPion [facility] SR) 150 mg 12 hr tablet Take 1 tablet (150 mg total) by mouth 2 (two) times a day   • Cholecalciferol (VITAMIN D3) 5000 units CAPS Take by mouth   • clotrimazole-betamethasone (LOTRISONE) 1-0 05 % cream Apply topically 2 (two) times a day prn   • cyclobenzaprine (FLEXERIL) 5 mg tablet Take 1 tablet (5 mg total) by mouth daily at bedtime   • EVENING PRIMROSE OIL PO Take by mouth   • FLUoxetine (PROzac) 40 MG capsule Take 1 capsule (40 mg total) by mouth daily   • fluticasone (FLONASE) 50 mcg/act nasal spray 2 sprays into each nostril daily   • FOLIC ACID PO Take by mouth   • hydrocortisone (ANUSOL-HC) 2 5 % rectal cream Apply topically 2 (two) times a day   • multivitamin (THERAGRAN) TABS Take 1 tablet by mouth daily   • Nerve Stimulator (Pro Comfort Tens Unit) JOSIAH 1 Device by Does not apply route daily   • phentermine 30 MG capsule Take 1 capsule (30 mg total) by mouth every morning   • simethicone (MYLICON,GAS-X) 250 MG capsule Take 1 capsule (180 mg total) by mouth 2 (two) times a day     No current facility-administered medications on file prior to visit  She is allergic to latex and shellfish allergy - food allergy       Review of Systems   Constitutional: Negative for activity change and appetite change  HENT: Negative for congestion, hearing loss, sore throat and trouble swallowing  Eyes: Negative for discharge and visual disturbance  Respiratory: Negative for cough, chest tightness, shortness of breath and wheezing  Cardiovascular: Negative for chest pain and palpitations  Gastrointestinal: Positive for anal bleeding, blood in stool and constipation (improved lately)  Negative for abdominal pain  Endocrine: Negative for cold intolerance and heat intolerance  Genitourinary: Negative for difficulty urinating  Musculoskeletal: Negative for gait problem  Skin: Negative for color change, rash and wound  Neurological: Negative for dizziness, speech difficulty and headaches  Psychiatric/Behavioral: Negative for behavioral problems and confusion  The patient is not nervous/anxious  Objective:      /70 (BP Location: Right arm, Patient Position: Standing, Cuff Size: Large)   Pulse 87   Temp 98 4 °F (36 9 °C) (Temporal)   Resp 15   Ht 5' 6" (1 676 m)   Wt 86 2 kg (190 lb)   SpO2 97%   BMI 30 67 kg/m²     Below is the patient's most recent value for Albumin, ALT, AST, BUN, Calcium, Chloride, Cholesterol, CO2, Creatinine, GFR, Glucose, HDL, Hematocrit, Hemoglobin, Hemoglobin A1C, LDL, Magnesium, Phosphorus, Platelets, Potassium, PSA, Sodium, Triglycerides, and WBC  Lab Results   Component Value Date    ALT 19 11/09/2021    AST 13 11/09/2021    BUN 13 11/09/2021    CALCIUM 8 4 11/09/2021     11/09/2021    CO2 26 11/09/2021    CREATININE 0 94 11/09/2021    HDL 55 08/08/2022    HCT 45 6 02/21/2022    HGB 16 3 (H) 02/21/2022    HGBA1C 5 0 08/08/2022     (H) 02/21/2022    K 4 1 11/09/2021    TRIG 91 08/08/2022    WBC 12 52 (H) 02/21/2022     Note: for a comprehensive list of the patient's lab results, access the Results Review activity  Physical Exam  Vitals and nursing note reviewed  Constitutional:       General: She is not in acute distress  Appearance: She is well-developed  She is not diaphoretic  HENT:      Head: Normocephalic and atraumatic  Eyes:      Pupils: Pupils are equal, round, and reactive to light  Cardiovascular:      Rate and Rhythm: Normal rate and regular rhythm  Pulmonary:      Effort: Pulmonary effort is normal  No respiratory distress  Abdominal:      Palpations: Abdomen is soft  Genitourinary:     Comments: Gr II and III hemorrhoids with punctate bleeding  Mild ttp  Musculoskeletal:         General: Normal range of motion  Cervical back: Normal range of motion and neck supple  Skin:     General: Skin is warm and dry  Neurological:      Mental Status: She is alert and oriented to person, place, and time  Psychiatric:         Mood and Affect: Mood normal          Behavior: Behavior normal          Thought Content:  Thought content normal          Judgment: Judgment normal              Procedures

## 2022-12-21 NOTE — H&P (VIEW-ONLY)
Assessment/Plan:     1  Hemorrhoids, unspecified hemorrhoid type  -     Ambulatory referral to General Surgery       We discussed the risks and benefits of hemorrhoidectomy  Risks not limited to bleeding, infection, pain, recurrence  Consent signed  Subjective:      Patient ID: Jessica Chow is a 56 y o  female  Triage Notes:    Ms Paulina Clay is presenting with hemorrhoids  Has been diagnosed with both internal and external hemorrhoids  Only symptom is bleeding  Is taking a stool softener and goes every morning  No pain or prolapse  Hysterectomy and c section (no history of vaginal delivery)  Splenectomy age 16  The following portions of the patient's history were reviewed and updated as appropriate:   She  has a past medical history of Anxiety, Depression, Fibroid, Head injury, Hereditary spherocytosis ([facility] [address] ), and Moderate major depression, single episode ([facility] [address] ) (4/30/2021)  She   Patient Active Problem List    Diagnosis Date Noted   • Depression, recurrent ([facility] [address] ) 04/01/2022   • Anxiety 08/03/2021   • Acute superficial gastritis without hemorrhage 05/05/2021   • Moderate major depression, single episode ([facility] [address] ) 04/30/2021   • Panic attacks 04/30/2021   • Globus pharyngeus 01/27/2021   • BMI 31 0-31 9,adult 09/11/2020   • H/O: hysterectomy 03/27/2020   • SAL (obstructive sleep apnea)    • 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   • Hereditary spherocytosis ([facility] [address] ) 04/07/2009     She  has a past surgical history that includes Hysterectomy (N/A); Splenectomy; and [place] tooth extraction  Her family history includes ADD / ADHD in her son; Arthritis in her mother; Hypertension in her family; Learning disabilities in her son  She  reports that she has never smoked  She has never used smokeless tobacco  She reports current alcohol use  She reports that she does not use drugs    Current Outpatient Medications on File Prior to Visit   Medication Sig   • Biotin 1000 MCG tablet Take 1,000 mcg by mouth daily   • buPROPion [facility] SR) 150 mg 12 hr tablet Take 1 tablet (150 mg total) by mouth 2 (two) times a day   • Cholecalciferol (VITAMIN D3) 5000 units CAPS Take by mouth   • clotrimazole-betamethasone (LOTRISONE) 1-0 05 % cream Apply topically 2 (two) times a day prn   • cyclobenzaprine (FLEXERIL) 5 mg tablet Take 1 tablet (5 mg total) by mouth daily at bedtime   • EVENING PRIMROSE OIL PO Take by mouth   • FLUoxetine (PROzac) 40 MG capsule Take 1 capsule (40 mg total) by mouth daily   • fluticasone (FLONASE) 50 mcg/act nasal spray 2 sprays into each nostril daily   • FOLIC ACID PO Take by mouth   • hydrocortisone (ANUSOL-HC) 2 5 % rectal cream Apply topically 2 (two) times a day   • multivitamin (THERAGRAN) TABS Take 1 tablet by mouth daily   • Nerve Stimulator (Pro Comfort Tens Unit) JOSIAH 1 Device by Does not apply route daily   • phentermine 30 MG capsule Take 1 capsule (30 mg total) by mouth every morning   • simethicone (MYLICON,GAS-X) 140 MG capsule Take 1 capsule (180 mg total) by mouth 2 (two) times a day     No current facility-administered medications on file prior to visit  She is allergic to latex and shellfish allergy - food allergy       Review of Systems   Constitutional: Negative for activity change and appetite change  HENT: Negative for congestion, hearing loss, sore throat and trouble swallowing  Eyes: Negative for discharge and visual disturbance  Respiratory: Negative for cough, chest tightness, shortness of breath and wheezing  Cardiovascular: Negative for chest pain and palpitations  Gastrointestinal: Positive for anal bleeding, blood in stool and constipation (improved lately)  Negative for abdominal pain  Endocrine: Negative for cold intolerance and heat intolerance  Genitourinary: Negative for difficulty urinating  Musculoskeletal: Negative for gait problem  Skin: Negative for color change, rash and wound  Neurological: Negative for dizziness, speech difficulty and headaches  Psychiatric/Behavioral: Negative for behavioral problems and confusion  The patient is not nervous/anxious  Objective:      /70 (BP Location: Right arm, Patient Position: Standing, Cuff Size: Large)   Pulse 87   Temp 98 4 °F (36 9 °C) (Temporal)   Resp 15   Ht 5' 6" (1 676 m)   Wt 86 2 kg (190 lb)   SpO2 97%   BMI 30 67 kg/m²     Below is the patient's most recent value for Albumin, ALT, AST, BUN, Calcium, Chloride, Cholesterol, CO2, Creatinine, GFR, Glucose, HDL, Hematocrit, Hemoglobin, Hemoglobin A1C, LDL, Magnesium, Phosphorus, Platelets, Potassium, PSA, Sodium, Triglycerides, and WBC  Lab Results   Component Value Date    ALT 19 11/09/2021    AST 13 11/09/2021    BUN 13 11/09/2021    CALCIUM 8 4 11/09/2021     11/09/2021    CO2 26 11/09/2021    CREATININE 0 94 11/09/2021    HDL 55 08/08/2022    HCT 45 6 02/21/2022    HGB 16 3 (H) 02/21/2022    HGBA1C 5 0 08/08/2022     (H) 02/21/2022    K 4 1 11/09/2021    TRIG 91 08/08/2022    WBC 12 52 (H) 02/21/2022     Note: for a comprehensive list of the patient's lab results, access the Results Review activity  Physical Exam  Vitals and nursing note reviewed  Constitutional:       General: She is not in acute distress  Appearance: She is well-developed  She is not diaphoretic  HENT:      Head: Normocephalic and atraumatic  Eyes:      Pupils: Pupils are equal, round, and reactive to light  Cardiovascular:      Rate and Rhythm: Normal rate and regular rhythm  Pulmonary:      Effort: Pulmonary effort is normal  No respiratory distress  Abdominal:      Palpations: Abdomen is soft  Genitourinary:     Comments: Gr II and III hemorrhoids with punctate bleeding  Mild ttp  Musculoskeletal:         General: Normal range of motion  Cervical back: Normal range of motion and neck supple  Skin:     General: Skin is warm and dry  Neurological:      Mental Status: She is alert and oriented to person, place, and time  Psychiatric:         Mood and Affect: Mood normal          Behavior: Behavior normal          Thought Content:  Thought content normal          Judgment: Judgment normal              Procedures

## 2023-01-11 ENCOUNTER — APPOINTMENT (OUTPATIENT)
Dept: LAB | Facility: CLINIC | Age: 48
End: 2023-01-11

## 2023-01-11 DIAGNOSIS — K64.9 HEMORRHOIDS, UNSPECIFIED HEMORRHOID TYPE: ICD-10-CM

## 2023-01-11 LAB
ALBUMIN SERPL BCP-MCNC: 3.5 G/DL (ref 3.5–5)
ALP SERPL-CCNC: 60 U/L (ref 46–116)
ALT SERPL W P-5'-P-CCNC: 22 U/L (ref 12–78)
ANION GAP SERPL CALCULATED.3IONS-SCNC: 5 MMOL/L (ref 4–13)
AST SERPL W P-5'-P-CCNC: 21 U/L (ref 5–45)
BASOPHILS # BLD AUTO: 0.08 THOUSANDS/ÂΜL (ref 0–0.1)
BASOPHILS NFR BLD AUTO: 1 % (ref 0–1)
BILIRUB SERPL-MCNC: 0.66 MG/DL (ref 0.2–1)
BUN SERPL-MCNC: 12 MG/DL (ref 5–25)
CALCIUM SERPL-MCNC: 9.1 MG/DL (ref 8.3–10.1)
CHLORIDE SERPL-SCNC: 108 MMOL/L (ref 96–108)
CO2 SERPL-SCNC: 25 MMOL/L (ref 21–32)
CREAT SERPL-MCNC: 0.84 MG/DL (ref 0.6–1.3)
EOSINOPHIL # BLD AUTO: 0.11 THOUSAND/ÂΜL (ref 0–0.61)
EOSINOPHIL NFR BLD AUTO: 1 % (ref 0–6)
ERYTHROCYTE [DISTWIDTH] IN BLOOD BY AUTOMATED COUNT: 12.8 % (ref 11.6–15.1)
GFR SERPL CREATININE-BSD FRML MDRD: 83 ML/MIN/1.73SQ M
GLUCOSE P FAST SERPL-MCNC: 90 MG/DL (ref 65–99)
HCT VFR BLD AUTO: 40.9 % (ref 34.8–46.1)
HGB BLD-MCNC: 14.3 G/DL (ref 11.5–15.4)
IMM GRANULOCYTES # BLD AUTO: 0.03 THOUSAND/UL (ref 0–0.2)
IMM GRANULOCYTES NFR BLD AUTO: 0 % (ref 0–2)
LYMPHOCYTES # BLD AUTO: 3.91 THOUSANDS/ÂΜL (ref 0.6–4.47)
LYMPHOCYTES NFR BLD AUTO: 45 % (ref 14–44)
MCH RBC QN AUTO: 32.6 PG (ref 26.8–34.3)
MCHC RBC AUTO-ENTMCNC: 35 G/DL (ref 31.4–37.4)
MCV RBC AUTO: 93 FL (ref 82–98)
MONOCYTES # BLD AUTO: 1 THOUSAND/ÂΜL (ref 0.17–1.22)
MONOCYTES NFR BLD AUTO: 12 % (ref 4–12)
NEUTROPHILS # BLD AUTO: 3.54 THOUSANDS/ÂΜL (ref 1.85–7.62)
NEUTS SEG NFR BLD AUTO: 41 % (ref 43–75)
NRBC BLD AUTO-RTO: 0 /100 WBCS
PLATELET # BLD AUTO: 592 THOUSANDS/UL (ref 149–390)
PMV BLD AUTO: 8.9 FL (ref 8.9–12.7)
POTASSIUM SERPL-SCNC: 4.5 MMOL/L (ref 3.5–5.3)
PROT SERPL-MCNC: 7.4 G/DL (ref 6.4–8.4)
RBC # BLD AUTO: 4.38 MILLION/UL (ref 3.81–5.12)
SODIUM SERPL-SCNC: 138 MMOL/L (ref 135–147)
WBC # BLD AUTO: 8.67 THOUSAND/UL (ref 4.31–10.16)

## 2023-01-12 NOTE — PRE-PROCEDURE INSTRUCTIONS
Pre-Surgery Instructions:   Medication Instructions   • Biotin 1000 MCG tablet Stop taking 7 days prior to surgery  • buPROPion (WELLBUTRIN SR) 150 mg 12 hr tablet Hold day of surgery  • Cholecalciferol (VITAMIN D3) 5000 units CAPS Stop taking 7 days prior to surgery  • clotrimazole-betamethasone (LOTRISONE) 1-0 05 % cream Hold day of surgery  • cyclobenzaprine (FLEXERIL) 5 mg tablet Uses PRN- OK to take day of surgery   • EVENING PRIMROSE OIL PO Stop taking 7 days prior to surgery  • FLUoxetine (PROzac) 40 MG capsule Hold day of surgery  • fluticasone (FLONASE) 50 mcg/act nasal spray Uses PRN- OK to take day of surgery   • FOLIC ACID PO Stop taking 7 days prior to surgery  • hydrocortisone (ANUSOL-HC) 2 5 % rectal cream Hold day of surgery  • multivitamin (THERAGRAN) TABS Stop taking 7 days prior to surgery  • phentermine 30 MG capsule Stop taking 5 days prior to surgery  • simethicone (MYLICON,GAS-X) 122 MG capsule Stop taking 7 days prior to surgery  Spoke with patient medication list reviewed  Npo after midnight  Stop all vitamins and nsaids 1 week prior to surgery  Aware to stop phentermine 5 days prior to surgery  Patient has surgical soap and understands shower instructions no shaving 24hrs prior to surgery  Patient has been vaccinated denies covid symptoms 1 vaccinated adult allowed with pt  Pt must have ride home after surgery  Patient advised to call surgeon if bowel prep needed number given  Bring photo id and insurance card   [facility] [place] will call 1/18 with arrival time and directions

## 2023-01-18 ENCOUNTER — ANESTHESIA EVENT (OUTPATIENT)
Dept: PERIOP | Facility: HOSPITAL | Age: 48
End: 2023-01-18

## 2023-01-19 ENCOUNTER — ANESTHESIA (OUTPATIENT)
Dept: PERIOP | Facility: HOSPITAL | Age: 48
End: 2023-01-19

## 2023-01-19 ENCOUNTER — HOSPITAL ENCOUNTER (OUTPATIENT)
Facility: HOSPITAL | Age: 48
Setting detail: OUTPATIENT SURGERY
Discharge: HOME | End: 2023-01-19
Attending: SURGERY | Admitting: SURGERY

## 2023-01-19 VITALS
TEMPERATURE: 97.6 F | HEIGHT: 66 IN | OXYGEN SATURATION: 99 % | RESPIRATION RATE: 14 BRPM | SYSTOLIC BLOOD PRESSURE: 106 MMHG | DIASTOLIC BLOOD PRESSURE: 66 MMHG | BODY MASS INDEX: 31.36 KG/M2 | WEIGHT: 195.11 LBS | HEART RATE: 67 BPM

## 2023-01-19 DIAGNOSIS — K64.9 HEMORRHOIDS, UNSPECIFIED HEMORRHOID TYPE: ICD-10-CM

## 2023-01-19 RX ORDER — DOCUSATE SODIUM 100 MG/1
100 CAPSULE, LIQUID FILLED ORAL 2 TIMES DAILY
Qty: 30 CAPSULE | Refills: 0 | Status: SHIPPED | OUTPATIENT
Start: 2023-01-19

## 2023-01-19 RX ORDER — DEXAMETHASONE SODIUM PHOSPHATE 10 MG/ML
INJECTION, SOLUTION INTRAMUSCULAR; INTRAVENOUS AS NEEDED
Status: DISCONTINUED | OUTPATIENT
Start: 2023-01-19 | End: 2023-01-19

## 2023-01-19 RX ORDER — HYDROCODONE BITARTRATE AND ACETAMINOPHEN 5; 325 MG/1; MG/1
1 TABLET ORAL EVERY 6 HOURS PRN
Qty: 14 TABLET | Refills: 0 | Status: SHIPPED | OUTPATIENT
Start: 2023-01-19 | End: 2023-01-23

## 2023-01-19 RX ORDER — OXYCODONE HYDROCHLORIDE 5 MG/1
5 TABLET ORAL EVERY 4 HOURS PRN
Status: DISCONTINUED | OUTPATIENT
Start: 2023-01-19 | End: 2023-01-19 | Stop reason: HOSPADM

## 2023-01-19 RX ORDER — MIDAZOLAM HYDROCHLORIDE 2 MG/2ML
INJECTION, SOLUTION INTRAMUSCULAR; INTRAVENOUS AS NEEDED
Status: DISCONTINUED | OUTPATIENT
Start: 2023-01-19 | End: 2023-01-19

## 2023-01-19 RX ORDER — IBUPROFEN 800 MG/1
800 TABLET ORAL EVERY 8 HOURS PRN
Qty: 30 TABLET | Refills: 0 | Status: SHIPPED | OUTPATIENT
Start: 2023-01-19

## 2023-01-19 RX ORDER — LIDOCAINE HYDROCHLORIDE AND EPINEPHRINE 10; 10 MG/ML; UG/ML
INJECTION, SOLUTION INFILTRATION; PERINEURAL AS NEEDED
Status: DISCONTINUED | OUTPATIENT
Start: 2023-01-19 | End: 2023-01-19 | Stop reason: HOSPADM

## 2023-01-19 RX ORDER — FENTANYL CITRATE 50 UG/ML
INJECTION, SOLUTION INTRAMUSCULAR; INTRAVENOUS AS NEEDED
Status: DISCONTINUED | OUTPATIENT
Start: 2023-01-19 | End: 2023-01-19

## 2023-01-19 RX ORDER — SODIUM CHLORIDE, SODIUM LACTATE, POTASSIUM CHLORIDE, CALCIUM CHLORIDE 600; 310; 30; 20 MG/100ML; MG/100ML; MG/100ML; MG/100ML
125 INJECTION, SOLUTION INTRAVENOUS CONTINUOUS
Status: DISCONTINUED | OUTPATIENT
Start: 2023-01-19 | End: 2023-01-19 | Stop reason: HOSPADM

## 2023-01-19 RX ORDER — METOCLOPRAMIDE HYDROCHLORIDE 5 MG/ML
10 INJECTION INTRAMUSCULAR; INTRAVENOUS ONCE AS NEEDED
Status: DISCONTINUED | OUTPATIENT
Start: 2023-01-19 | End: 2023-01-19 | Stop reason: HOSPADM

## 2023-01-19 RX ORDER — MAGNESIUM HYDROXIDE 1200 MG/15ML
LIQUID ORAL AS NEEDED
Status: DISCONTINUED | OUTPATIENT
Start: 2023-01-19 | End: 2023-01-19 | Stop reason: HOSPADM

## 2023-01-19 RX ORDER — LIDOCAINE HYDROCHLORIDE 20 MG/ML
INJECTION, SOLUTION EPIDURAL; INFILTRATION; INTRACAUDAL; PERINEURAL AS NEEDED
Status: DISCONTINUED | OUTPATIENT
Start: 2023-01-19 | End: 2023-01-19

## 2023-01-19 RX ORDER — FENTANYL CITRATE/PF 50 MCG/ML
50 SYRINGE (ML) INJECTION
Status: DISCONTINUED | OUTPATIENT
Start: 2023-01-19 | End: 2023-01-19 | Stop reason: HOSPADM

## 2023-01-19 RX ORDER — PROPOFOL 10 MG/ML
INJECTION, EMULSION INTRAVENOUS AS NEEDED
Status: DISCONTINUED | OUTPATIENT
Start: 2023-01-19 | End: 2023-01-19

## 2023-01-19 RX ORDER — ONDANSETRON 2 MG/ML
INJECTION INTRAMUSCULAR; INTRAVENOUS AS NEEDED
Status: DISCONTINUED | OUTPATIENT
Start: 2023-01-19 | End: 2023-01-19

## 2023-01-19 RX ORDER — CEFAZOLIN SODIUM 2 G/50ML
2000 SOLUTION INTRAVENOUS ONCE
Status: COMPLETED | OUTPATIENT
Start: 2023-01-19 | End: 2023-01-19

## 2023-01-19 RX ORDER — HYDROMORPHONE HCL/PF 1 MG/ML
0.5 SYRINGE (ML) INJECTION
Status: DISCONTINUED | OUTPATIENT
Start: 2023-01-19 | End: 2023-01-19 | Stop reason: HOSPADM

## 2023-01-19 RX ADMIN — CEFAZOLIN SODIUM 2000 MG: 2 SOLUTION INTRAVENOUS at 07:51

## 2023-01-19 RX ADMIN — ONDANSETRON 4 MG: 2 INJECTION INTRAMUSCULAR; INTRAVENOUS at 08:17

## 2023-01-19 RX ADMIN — PROPOFOL 200 MG: 10 INJECTION, EMULSION INTRAVENOUS at 08:00

## 2023-01-19 RX ADMIN — FENTANYL CITRATE 50 MCG: 50 INJECTION, SOLUTION INTRAMUSCULAR; INTRAVENOUS at 08:18

## 2023-01-19 RX ADMIN — LIDOCAINE HYDROCHLORIDE 100 MG: 20 INJECTION, SOLUTION EPIDURAL; INFILTRATION; INTRACAUDAL; PERINEURAL at 07:58

## 2023-01-19 RX ADMIN — DEXAMETHASONE SODIUM PHOSPHATE 5 MG: 10 INJECTION INTRAMUSCULAR; INTRAVENOUS at 08:17

## 2023-01-19 RX ADMIN — FENTANYL CITRATE 50 MCG: 50 INJECTION, SOLUTION INTRAMUSCULAR; INTRAVENOUS at 09:11

## 2023-01-19 RX ADMIN — MIDAZOLAM HYDROCHLORIDE 2 MG: 1 INJECTION, SOLUTION INTRAMUSCULAR; INTRAVENOUS at 07:55

## 2023-01-19 RX ADMIN — PROPOFOL 200 MG: 10 INJECTION, EMULSION INTRAVENOUS at 07:59

## 2023-01-19 RX ADMIN — OXYCODONE HYDROCHLORIDE 5 MG: 5 TABLET ORAL at 09:40

## 2023-01-19 RX ADMIN — SODIUM CHLORIDE, SODIUM LACTATE, POTASSIUM CHLORIDE, AND CALCIUM CHLORIDE 125 ML/HR: .6; .31; .03; .02 INJECTION, SOLUTION INTRAVENOUS at 07:51

## 2023-01-19 NOTE — ANESTHESIA POSTPROCEDURE EVALUATION
Post-Op Assessment Note    CV Status:  Stable    Pain management: adequate     Mental Status:  Sleepy   Hydration Status:  Euvolemic   PONV Controlled:  Controlled   Airway Patency:  Patent      Post Op Vitals Reviewed: Yes            No notable events documented      BP   113/61   Temp  98 2   Pulse  84   Resp   18   SpO2   99

## 2023-01-19 NOTE — INTERVAL H&P NOTE
H&P reviewed  After examining the patient I find no changes in the patients condition since the H&P had been written      Vitals:    01/19/23 0714   BP: 115/61   Pulse: 74   Resp: 15   Temp: (!) 97 4 °F (36 3 °C)   SpO2: 99%

## 2023-01-19 NOTE — OP NOTE
OPERATIVE REPORT  PATIENT NAME: Shiva Bernal    :  1975  MRN: [id number]  Pt Location: [facility] OR ROOM 02    SURGERY DATE: 2023    Surgeon(s) and Role:     * Cally Farooq MD - Primary    Preop Diagnosis:  Hemorrhoids, unspecified hemorrhoid type [K64 9]    Post-Op Diagnosis Codes:     * Hemorrhoids, unspecified hemorrhoid type [K64 9]    Procedure(s) (LRB):  HEMORRHOIDECTOMY EXCISION (N/A)    Specimen(s):  ID Type Source Tests Collected by Time Destination   1 : Hemorrhoid Tissue Hemorrhoids TISSUE EXAM Cally Farooq MD 2023 1923        Estimated Blood Loss:   Minimal    Drains:  none    Anesthesia Type:   IV Sedation with Anesthesia    Operative Indications:  Hemorrhoids, unspecified hemorrhoid type [K64 9]      Operative Findings:  Gr II and a single grade III internal and external hemorrhoidal column that demonstrated contact bleeding    Complications:   None    Procedure and Technique:  Procedure Details   After discussion and acceptance of all risks, benefits and alternatives the luis-anal skin was prepared with hibiclens (due to betadine allergy)  A luis-anal block with 15 cc's of lidocaine with epinephrine was infiltrated  Hill Mcmahon anoscope was inserted and the hemorrhoidal petechial defined  Using aliss clamps the hemorrhoid was elevated off the internal sphincter  The hemorrhoid was excised using the harmonic scalpel with adequate hemostasis and passed off as specimen  A rolled guaze covered with surgicele was coated in lube and placed as a rectal tampon for hemostasis  Findings: There were mild enlarged hemorrhoids in all quadrants with a single severely enlarged and bleeding hemorrhoid at 5 oclock which was excised        I was present for the entire procedure and A qualified resident physician was not available    Patient Disposition:  PACU  and extubated and stable        SIGNATURE: Cally Farooq MD  DATE: 2023  TIME: 8:32 AM

## 2023-01-19 NOTE — ANESTHESIA PREPROCEDURE EVALUATION
Procedure:  HEMORRHOIDECTOMY EXCISION (Anus)    Relevant Problems   GYN   (+) H/O: hysterectomy      HEMATOLOGY   (+) Hereditary spherocytosis (HCC)      NEURO/PSYCH   (+) Anxiety   (+) Depression, recurrent (HCC)   (+) Moderate major depression, single episode (HCC)   (+) Panic attacks      PULMONARY   (+) SAL (obstructive sleep apnea)        Physical Exam    Airway    Mallampati score: I  TM Distance: >3 FB  Neck ROM: full     Dental   No notable dental hx     Cardiovascular      Pulmonary      Other Findings        Anesthesia Plan  ASA Score- 2     Anesthesia Type- general with ASA Monitors  Additional Monitors:   Airway Plan: LMA  Plan Factors-Exercise tolerance (METS): >4 METS  Chart reviewed  Existing labs reviewed  Patient summary reviewed  Patient is not a current smoker  There is medical exclusion for perioperative obstructive sleep apnea risk education  Induction- intravenous  Postoperative Plan- Plan for postoperative opioid use  Informed Consent- Anesthetic plan and risks discussed with patient  I personally reviewed this patient with the CRNA  Discussed and agreed on the Anesthesia Plan with the CRNA  Rosmery Coronel

## 2023-01-19 NOTE — DISCHARGE INSTR - AVS FIRST PAGE
Hemorrhoidectomy   WHAT YOU NEED TO KNOW:   A hemorrhoidectomy is surgery to remove a hemorrhoid  DISCHARGE INSTRUCTIONS:     Call 911 for any of the following: You have trouble breathing  Seek care immediately if:   Blood soaks through your bandage or underwear  Your stitches come apart  You have severe pain in your rectum or abdomen  You cannot urinate, or you urinate very little  Contact your healthcare provider if:   You have a fever or chills  Your pain does not get better after you take pain medicine  You do not have a bowel movement 48 hours after surgery  You have severe pain when you have a bowel movement  Your wound is red, swollen, or draining pus  You have nausea or are vomiting  Your skin is itchy, swollen, or you have a rash  You have trouble controlling your bowel movements  You have questions or concerns about your condition or care  Medicines: You may need any of the following:    Medicine  may be given to decrease pain and swelling  The medicine may come as a pad, cream, or ointment  Stool softeners  help prevent constipation  Laxatives  help you have a bowel movement and prevent constipation  Prescription pain medicine  may be given  Ask your healthcare provider how to take this medicine safely  Some prescription pain medicines contain acetaminophen  Do not take other medicines that contain acetaminophen without talking to your healthcare provider  Too much acetaminophen may cause liver damage  Prescription pain medicine may cause constipation  Ask your healthcare provider how to prevent or treat constipation  NSAIDs , such as ibuprofen, help decrease swelling, pain, and fever  NSAIDs can cause stomach bleeding or kidney problems in certain people  If you take blood thinner medicine, always ask your healthcare provider if NSAIDs are safe for you  Always read the medicine label and follow directions      Take your medicine as directed  Contact your healthcare provider if you think your medicine is not helping or if you have side effects  Tell him or her if you are allergic to any medicine  Keep a list of the medicines, vitamins, and herbs you take  Include the amounts, and when and why you take them  Bring the list or the pill bottles to follow-up visits  Carry your medicine list with you in case of an emergency  Care for your wound as directed:   Remove your bandage or packing as directed  Carefully wash around the wound with soap and water  It is okay to let soap and water gently run over your incision  Gently pat the area dry  Apply ointment or cream as directed  Put on new, clean bandages as directed  Change your bandages when they get wet or dirty  Keep your anal area clean  After a bowel movement, wipe with moist towelettes or wet toilet paper  Dry toilet paper can irritate the area  Wear a sanitary pad to absorb bleeding and keep the area clean and dry  Self-care:   Apply ice on your anus for 15 to 20 minutes every hour or as directed  Use an ice pack, or put crushed ice in a plastic bag  Cover it with a towel before you put it on your skin  Ice helps prevent tissue damage and decreases swelling and pain  Take a sitz bath  A sitz bath can help decrease pain and swelling  Do this 3 times a day, and after each bowel movement  Fill a bathtub with 4 to 6 inches of warm water  You may also use a sitz bath pan that fits inside a toilet bowl  Sit in the sitz bath for 15 minutes  Sit on a pillow or a donut-shaped cushion  This helps relieve pressure and pain on your incision  You may use this for up to one week  f you have pain when you sit, lie on your side  Do not have anal sex  Anal sex can cause your wound to come apart  Ask your healthcare provider how long you need to follow these instructions  Do not lift anything heavier than 10 pounds    This can increase pressure in your rectum or anus and cause your incision to come apart  Prevent constipation:  You should try to have a bowel movement within 48 hours after surgery  Constipation can cause pain and put pressure on your incision  Do the following to prevent constipation:    Drink plenty of liquids  Liquids can help prevent constipation and straining  Ask how much liquid to drink each day and which liquids are best for you  Eat a variety of high-fiber foods  This will help make it easier to have a bowel movement  Examples include fruits, vegetables, and whole grains  Ask your healthcare provider how much fiber you need each day  You may need to take a fiber supplement  Exercise as directed  Exercise, such as walking, may make it easier to have a bowel movement  Ask your healthcare provider what exercises are safe for you to do after surgery  Follow up with your doctor as directed:  Write down your questions so you remember to ask them during your visits  © Copyright [employer] 2022 Information is for End User's use only and may not be sold, redistributed or otherwise used for commercial purposes  All illustrations and images included in CareNotes® are the copyrighted property of A D A M [employer] Inc  or [facility] Monie Vargas   The above information is an  only  It is not intended as medical advice for individual conditions or treatments  Talk to your doctor, nurse or pharmacist before following any medical regimen to see if it is safe and effective for you

## 2023-01-31 ENCOUNTER — PROCEDURE VISIT (OUTPATIENT)
Dept: NEUROLOGY | Facility: CLINIC | Age: 48
End: 2023-01-31

## 2023-01-31 DIAGNOSIS — G56.02 CARPAL TUNNEL SYNDROME ON LEFT: ICD-10-CM

## 2023-01-31 NOTE — PROGRESS NOTES
EMG 1 Limb     Date/Time 1/31/2023 2:45 PM     Performed by  Dirk Potter MD     Authorized by Rajinder Schmid MD                [facility] AT [address]  [phone]    Electromyography & Nerve Conduction Studies Report          Full Name: Tabitha Thomas Gender: Female  MRN: [id number] YOB: 1975      Visit Date: 1/31/2023 3:03 PM  Age: 56 Years  Examining Physician: Dirk Potter MD   Referring Physician: DR OLIVER  Medical History: 52-year-old female presents with complaints of left wrist pain associated with numbness in all fingers that started about 6 months ago and has been progressively worsening  Symptoms are worse in the first 3 digits  Patient is being evaluated for a focal neuropathy  temp 32      Sensory Nerve Conduction Study       Nerve / Sites Rec  Site Onset Lat Peak Lat  Amp Segments Distance Peak Diff Velocity     ms ms µV  cm ms m/s   L Median - Dig II (Antidromic)      Wrist Index 2 9 4 1 48 2 Wrist - Index 13  45      Ref  ?3 5 ? 20 0 Ref  ?50   L Ulnar - Dig V (Antidromic)      Wrist Dig V 2 7 3 5 27 0 Wrist - Dig V 11  41      Ref  ?3 1 ? 17 0 Ref  ?50   L Median, Ulnar - Palmar      Median Palm Wrist 1 4 1 8 53 9 Median Palm - Wrist 8  57      Ref  ?2 2 ? 50 0 Ref  ?50      Ulnar Palm Wrist 1 4 1 8 20 0 Ulnar Palm - Wrist 8  57      Ref  ?2 2 ? 12 0 Ref  ?50        Median Palm - Ulnar Palm  0 1         Ref  ?0 4    L Radial - Superficial (Antidromic)      Forearm Wrist 1 7 2 3 22 0 Forearm - Wrist 10  58      Ref  ?2 9 ? 15 0 Ref  ?50       Motor Nerve Conduction Study       Nerve / Sites Muscle Latency Ref  Amplitude Ref  Segments Distance Lat Diff Velocity Ref       ms ms mV mV  cm ms m/s m/s   L Median - APB      Wrist APB 3 5 ?4 4 7 1 ?4 0 Wrist - APB 7         Elbow APB 7 4  6 6  Elbow - Wrist 20 3 90 51 ?49   L Ulnar - ADM      Wrist ADM 2 5 ?3 3 12 6 ?6 0 Wrist - ADM 7 B Elbow ADM 6 6  12 4  B  Elbow - Wrist 20 4 06 49 ?49      A  Elbow ADM 8 1  12 3  A  Elbow - B  Elbow 10 1 52 66 ?49       F Waves       Nerve F Latency Ref  ms ms   L Median - APB  26 3 ?31 0   L Ulnar - ADM  25 6 ?32 0       EMG Summary Table     Spontaneous MUAP Recruitment   Muscle Nerve Roots IA Fib PSW Fasc H F  Dur  Amp PPP Config Pattern   L  First dorsal interosseous Ulnar C8-T1 NL None None None None NL NL None NL NL   L  Deltoid Axillary C5-C6 NL None None None None NL NL None NL NL   L  Biceps brachii Musculocut  C5-C6 NL None None None None NL NL None NL NL   L  Triceps brachii Radial C6-C8 NL None None None None NL NL None NL NL   L  Pronator teres Median C6-C7 NL None None None None NL NL None NL NL   L  Abductor pollicis brevis Median Z8-U4 NL None None None None NL NL None NL NL   L  Cervical paraspinals (low)  - NL None None None None NL NL None NL NL                       Summary      Motor and sensory conduction studies were performed on the left median and ulnar nerves  The median and ulnar compound motor action potentials were normal       The median sensory peak latency was prolonged with a normal sensory action potential amplitude and slow conduction velocity across the wrist   The ulnar sensory peak latency was normal with a normal sensory action potential amplitude and a slow conduction velocity across the wrist   The median and ulnar palmar evoked response was normal   The superficial radial sensory action potential was normal     The median and ulnar F waves were normal         Concentric needle EMG was performed on various distal and proximal muscles of the left upper extremity including APB, FDI, pronator teres, deltoid, biceps, triceps and low cervical paraspinal region  There was no evidence of active denervation in any of the muscles tested  The compound motor unit action potentials were of normal configuration with interference patterns being full or full for effort  The risks and benefits of this procedure have been explained to the patient  Impression:      Normal study  1 There is no electrophysiologic evidence of a median or ulnar neuropathy  2  There is no electrophysiologic evidence of a cervical radiculopathy

## 2023-02-02 ENCOUNTER — OFFICE VISIT (OUTPATIENT)
Dept: SURGERY | Facility: CLINIC | Age: 48
End: 2023-02-02

## 2023-02-02 VITALS
BODY MASS INDEX: 32.3 KG/M2 | OXYGEN SATURATION: 96 % | HEIGHT: 66 IN | DIASTOLIC BLOOD PRESSURE: 72 MMHG | TEMPERATURE: 98 F | SYSTOLIC BLOOD PRESSURE: 116 MMHG | WEIGHT: 201 LBS | RESPIRATION RATE: 16 BRPM | HEART RATE: 81 BPM

## 2023-02-02 DIAGNOSIS — Z87.19 S/P HEMORRHOIDECTOMY: Primary | ICD-10-CM

## 2023-02-02 DIAGNOSIS — Z98.890 S/P HEMORRHOIDECTOMY: Primary | ICD-10-CM

## 2023-02-02 NOTE — PROGRESS NOTES
Assessment/Plan:    Pathology Results:  Final Diagnosis   A  Hemorrhoids, hemorrhoidectomy:  -Anorectal mucosa with dilated submucosal vessels, consistent with hemorrhoids  1  S/P hemorrhoidectomy      Decrease motrin from 800mg to 600mg as needed  Take with food  Continue bid colace - other laxatives only if needed  Followup in about 4 weeks for recheck of incision/healing  Subjective:      Patient ID: Milagro Montejo is a 56 y o  female  Triage Notes:    Ms Francia Thompson is following up after excision of internal/external hemorrhoidal column  Although we discussed pain at her preop she feels she could not be prepared for the pain she experienced  The worst of which was the rectal packing removal  The bleeding is decreasing but not resolved, some with wiping  As is the pain - decreased some but not resolved  The following portions of the patient's history were reviewed and updated as appropriate: allergies, current medications, past family history, past medical history, past social history, past surgical history and problem list     Review of Systems      Objective:      /72   Pulse 81   Temp 98 °F (36 7 °C) (Temporal)   Resp 16   Ht 5' 6" (1 676 m)   Wt 91 2 kg (201 lb)   SpO2 96%   BMI 32 44 kg/m²     Below is the patient's most recent value for Albumin, ALT, AST, BUN, Calcium, Chloride, Cholesterol, CO2, Creatinine, GFR, Glucose, HDL, Hematocrit, Hemoglobin, Hemoglobin A1C, LDL, Magnesium, Phosphorus, Platelets, Potassium, PSA, Sodium, Triglycerides, and WBC     Lab Results   Component Value Date    ALT 22 01/11/2023    AST 21 01/11/2023    BUN 12 01/11/2023    CALCIUM 9 1 01/11/2023     01/11/2023    CO2 25 01/11/2023    CREATININE 0 84 01/11/2023    HDL 55 08/08/2022    HCT 40 9 01/11/2023    HGB 14 3 01/11/2023    HGBA1C 5 0 08/08/2022     (H) 01/11/2023    K 4 5 01/11/2023    TRIG 91 08/08/2022    WBC 8 67 01/11/2023     Note: for a comprehensive list of the patient's lab results, access the Results Review activity  Physical Exam  Vitals and nursing note reviewed  Constitutional:       General: She is not in acute distress  Appearance: She is well-developed  She is not diaphoretic  HENT:      Head: Normocephalic and atraumatic  Mouth/Throat:      Mouth: Mucous membranes are moist    Eyes:      General: No scleral icterus  Pupils: Pupils are equal, round, and reactive to light  Cardiovascular:      Rate and Rhythm: Normal rate and regular rhythm  Pulmonary:      Effort: Pulmonary effort is normal  No respiratory distress  Abdominal:      Palpations: Abdomen is soft  Genitourinary:     Comments: External exam only  Digital exam deferred due to potential discomfort  External portion of excision is healing  No active bleeding  Musculoskeletal:         General: Normal range of motion  Cervical back: Normal range of motion and neck supple  Skin:     General: Skin is warm and dry  Neurological:      Mental Status: She is alert and oriented to person, place, and time  Psychiatric:         Mood and Affect: Mood normal          Behavior: Behavior normal          Thought Content:  Thought content normal          Judgment: Judgment normal              Procedures

## 2023-02-06 ENCOUNTER — TELEPHONE (OUTPATIENT)
Dept: SURGERY | Facility: CLINIC | Age: 48
End: 2023-02-06

## 2023-02-06 NOTE — TELEPHONE ENCOUNTER
Pt called requesting ibuprofen and colace sent to her pharmacy  She was informed she can get this over the counter but she states she does not want to do it over the counter         Pt requesting this message to be sent with "urgent" priority

## 2023-02-07 RX ORDER — IBUPROFEN 600 MG/1
600 TABLET ORAL EVERY 6 HOURS PRN
Qty: 30 TABLET | Refills: 1 | Status: SHIPPED | OUTPATIENT
Start: 2023-02-07

## 2023-02-07 RX ORDER — DOCUSATE SODIUM 100 MG/1
100 CAPSULE, LIQUID FILLED ORAL 2 TIMES DAILY
Qty: 60 CAPSULE | Refills: 6 | Status: SHIPPED | OUTPATIENT
Start: 2023-02-07

## 2023-02-15 ENCOUNTER — CLINICAL SUPPORT (OUTPATIENT)
Dept: FAMILY MEDICINE CLINIC | Facility: CLINIC | Age: 48
End: 2023-02-15

## 2023-02-15 DIAGNOSIS — J34.89 SINUS PRESSURE: ICD-10-CM

## 2023-02-15 DIAGNOSIS — J02.9 SORE THROAT: Primary | ICD-10-CM

## 2023-02-15 LAB
S PYO AG THROAT QL: POSITIVE
SARS-COV-2 AG UPPER RESP QL IA: NEGATIVE
VALID CONTROL: NORMAL

## 2023-02-22 DIAGNOSIS — G56.02 CARPAL TUNNEL SYNDROME ON LEFT: Primary | ICD-10-CM

## 2023-02-22 DIAGNOSIS — M54.12 CERVICAL RADICULITIS: ICD-10-CM

## 2023-02-22 RX ORDER — MELOXICAM 15 MG/1
15 TABLET ORAL DAILY
Qty: 30 TABLET | Refills: 0 | Status: SHIPPED | OUTPATIENT
Start: 2023-02-22

## 2023-03-01 ENCOUNTER — OFFICE VISIT (OUTPATIENT)
Dept: SURGERY | Facility: CLINIC | Age: 48
End: 2023-03-01

## 2023-03-01 VITALS
DIASTOLIC BLOOD PRESSURE: 80 MMHG | HEART RATE: 92 BPM | OXYGEN SATURATION: 97 % | BODY MASS INDEX: 32.47 KG/M2 | HEIGHT: 66 IN | SYSTOLIC BLOOD PRESSURE: 112 MMHG | RESPIRATION RATE: 16 BRPM | TEMPERATURE: 98 F | WEIGHT: 202 LBS

## 2023-03-01 DIAGNOSIS — Z87.19 S/P HEMORRHOIDECTOMY: Primary | ICD-10-CM

## 2023-03-01 DIAGNOSIS — Z98.890 S/P HEMORRHOIDECTOMY: Primary | ICD-10-CM

## 2023-03-01 NOTE — PROGRESS NOTES
Assessment/Plan:    Pathology Results:  Final Diagnosis   A  Hemorrhoids, hemorrhoidectomy:  -Anorectal mucosa with dilated submucosal vessels, consistent with hemorrhoids  1  S/P hemorrhoidectomy      path reviewed  Reviewed high water and high fiber intake  Ways to prevent prolonged straining such as positioning with a stool during defecation  F/u prn  Subjective:      Patient ID: Elva Andersen is a 56 y o  female  Triage Notes:    Ms Yonis Mckinley is following up after excision of internal/external hemorrhoidal column - this is her second followup  Her pain, bleeding has resolved  Wants to know if waxing is safe  She is having soft daily bowel movements  The following portions of the patient's history were reviewed and updated as appropriate: allergies, current medications, past family history, past medical history, past social history, past surgical history and problem list     Review of Systems   Constitutional: Negative for activity change and appetite change  HENT: Negative for congestion, hearing loss, sore throat and trouble swallowing  Eyes: Negative for discharge and visual disturbance  Respiratory: Negative for cough, chest tightness, shortness of breath and wheezing  Cardiovascular: Negative for chest pain and palpitations  Gastrointestinal: Negative for abdominal pain  Endocrine: Negative for cold intolerance and heat intolerance  Genitourinary: Negative for difficulty urinating  Musculoskeletal: Negative for gait problem  Skin: Negative for color change, rash and wound  Neurological: Negative for dizziness, speech difficulty and headaches  Psychiatric/Behavioral: Negative for behavioral problems and confusion  The patient is not nervous/anxious            Objective:      /80   Pulse 92   Temp 98 °F (36 7 °C) (Temporal)   Resp 16   Ht 5' 6" (1 676 m)   Wt 91 6 kg (202 lb)   SpO2 97%   BMI 32 60 kg/m²     Below is the patient's most recent value for Albumin, ALT, AST, BUN, Calcium, Chloride, Cholesterol, CO2, Creatinine, GFR, Glucose, HDL, Hematocrit, Hemoglobin, Hemoglobin A1C, LDL, Magnesium, Phosphorus, Platelets, Potassium, PSA, Sodium, Triglycerides, and WBC  Lab Results   Component Value Date    ALT 22 01/11/2023    AST 21 01/11/2023    BUN 12 01/11/2023    CALCIUM 9 1 01/11/2023     01/11/2023    CO2 25 01/11/2023    CREATININE 0 84 01/11/2023    HDL 55 08/08/2022    HCT 40 9 01/11/2023    HGB 14 3 01/11/2023    HGBA1C 5 0 08/08/2022     (H) 01/11/2023    K 4 5 01/11/2023    TRIG 91 08/08/2022    WBC 8 67 01/11/2023     Note: for a comprehensive list of the patient's lab results, access the Results Review activity  Physical Exam  Vitals and nursing note reviewed  Constitutional:       General: She is not in acute distress  Appearance: She is well-developed  She is not diaphoretic  HENT:      Head: Normocephalic and atraumatic  Mouth/Throat:      Mouth: Mucous membranes are moist    Eyes:      General: No scleral icterus  Pupils: Pupils are equal, round, and reactive to light  Cardiovascular:      Rate and Rhythm: Normal rate and regular rhythm  Pulmonary:      Effort: Pulmonary effort is normal  No respiratory distress  Abdominal:      Palpations: Abdomen is soft  Genitourinary:     Comments: External incision has healed  No skin tags or external hemorrhoids  Musculoskeletal:         General: Normal range of motion  Cervical back: Normal range of motion and neck supple  Skin:     General: Skin is warm and dry  Neurological:      Mental Status: She is alert and oriented to person, place, and time  Psychiatric:         Mood and Affect: Mood normal          Behavior: Behavior normal          Thought Content:  Thought content normal          Judgment: Judgment normal              Procedures

## 2023-03-21 ENCOUNTER — EVALUATION (OUTPATIENT)
Dept: PHYSICAL THERAPY | Facility: CLINIC | Age: 48
End: 2023-03-21

## 2023-03-21 DIAGNOSIS — G56.02 CARPAL TUNNEL SYNDROME ON LEFT: ICD-10-CM

## 2023-03-21 DIAGNOSIS — M54.12 CERVICAL RADICULITIS: Primary | ICD-10-CM

## 2023-03-21 NOTE — PROGRESS NOTES
PT Evaluation     Today's date: 3/21/2023  Patient name: Néstor Sampson  : 1975  MRN: [id number]  Referring provider: Vicki Brantley MD  Dx:   Encounter Diagnosis     ICD-10-CM    1  Cervical radiculitis  M54 12 Ambulatory Referral to Physical Therapy      2  Carpal tunnel syndrome on left  G56 02 Ambulatory Referral to Physical Therapy          Start Time: 1715  Stop Time: 1800  Total time in clinic (min): 45 minutes    Assessment  Assessment details: Pt is a 55 y/o female presenting to physical therapy with L cervical pain that radiates in her L shoulder and numbness/tingling in her L wrist  Upon examination, pt presents with impairments of decreased strength, decreased ROM, and increased pain of pt's cervical spine/L wrist resulting in limitations of self-care/ADLs, household/work activities, and sleeping  Pt plan of care will focus on improving strength and ROM of pt's cervical spine/L wrist as well as decreasing pain and improving tolerance to functional activities  Pt would benefit from skilled physical therapy to facilitate a return to her prior level of function  Impairments: abnormal or restricted ROM, activity intolerance, impaired physical strength, lacks appropriate home exercise program, pain with function and poor posture   Functional limitations: self-care/ADLs, household/work activities, and sleeping  Symptom irritability: moderateUnderstanding of Dx/Px/POC: good   Prognosis: good    Goals  STG - 4 weeks  1  Pt will have a subjective decrease in pain of her cervical spine by 2 levels or more during her work activities  2  Pt will demonstrate WNL AROM of her cervical spine in all planes to facilitate a return to her prior level of function    LTG - 8 weeks  1  Pt will demonstrate 4/5 gross strength or better of her cervical spine in all planes to facilitate a return to her prior level of function    2  Pt's FOTO score will improve from 38 to 53 or better to facilitate a return to pt's prior level of function  Plan  Patient would benefit from: PT eval and skilled physical therapy  Planned modality interventions: cryotherapy, thermotherapy: hydrocollator packs and unattended electrical stimulation  Planned therapy interventions: activity modification, ADL training, flexibility, functional ROM exercises, graded exercise, home exercise program, joint mobilization, manual therapy, massage, Hall taping, neuromuscular re-education, patient education, postural training, self care, strengthening, stretching, therapeutic activities and therapeutic exercise  Frequency: 1x week  Duration in weeks: 8  Plan of Care beginning date: 3/21/2023  Plan of Care expiration date: 2023  Treatment plan discussed with: patient        Subjective Evaluation    History of Present Illness  Mechanism of injury: Pt is a 55 y/o female presenting to physical therapy with L cervical pain that radiates in her L shoulder and numbness/tingling in her L wrist  Pt reports her neck pain started three years ago where she was injured  Pt reports the neck pain worsened over that time frame and she was experiencing numbness/tingling in her LUE  Pt reports the numbness/tingling did improve in her LUE, however, the pain persisted  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 as well as laying on her L side  Pt reports she will have decreased pain with stretching and taking tylenol   Pt reports she did therapy three years ago which helped her neck  Pain  Current pain ratin  At best pain ratin  At worst pain rating: 10  Location: L cervical  Quality: sharp  Relieving factors: rest and medications  Progression: no change    Treatments  Previous treatment: injection treatment, medication and physical therapy  Current treatment: medication and physical therapy  Patient Goals  Patient goal: decrease pain in her neck/shoulder        Objective     Postural Observations  Seated posture: fair  Standing posture: fair  Correction of posture: makes symptoms worse        Palpation   Left   Tenderness of the levator scapulae and upper trapezius  Tenderness   Cervical Spine   Tenderness in the left scapula  Active Range of Motion   Cervical/Thoracic Spine       Cervical    Flexion: 30 degrees  Restriction level: moderate  Extension: 40 degrees     Restriction level: moderate  Left lateral flexion: 35 degrees     Restriction level: minimal  Right lateral flexion: 30 degrees     Restriction level minimal  Left rotation: 40 degrees Restriction level: moderate  Right rotation: 20 degrees    Restriction level: maximal    Left Wrist   Wrist flexion: WFL  Wrist extension: [facility]    Joint Play     Pain: C6 and C7     Strength/Myotome Testing   Cervical Spine   Neck extension: 4-  Neck flexion: 4-    Left   Neck lateral flexion (C3): 4-    Right   Neck lateral flexion (C3): 4-    Left Shoulder     Planes of Motion   Flexion: 4-   Abduction: 4-   External rotation at 0°: 4-     Right Shoulder     Planes of Motion   Flexion: 4   Abduction: 4   External rotation at 0°: 4     Left Elbow   Flexion: 4  Extension: 3+    Right Elbow   Flexion: 4+  Extension: 4+    Left Wrist/Hand   Wrist extension: 4  Wrist flexion: 3+     (2nd hand position)     Trial 1: 25    Trial 2: 20    Right Wrist/Hand   Wrist extension: 4+  Wrist flexion: 4+     (2nd hand position)     Trial 1: 40    Trial 2: 40    Tests   Cervical     Left   Positive Spurling's Test A and cervical flexion-rotation test       Left Shoulder   Positive ULTT1  Left Wrist/Hand   Negative Phalen's sign and Tinel's sign (medial nerve)                Precautions: N/A      Manuals 3/21            Manual cervical traction LUKAS            Grade V thoracic mob LUKAS                                      Neuro Re-Ed             Education on POC, diagnosis, and HEP 5'            Cervical retraction 1x10 5"            webslide lower trap holds 1x5 grn 5" Ther Ex                                                                                                                     Ther Activity                                       Gait Training                                       Modalities

## 2023-03-28 ENCOUNTER — APPOINTMENT (OUTPATIENT)
Dept: PHYSICAL THERAPY | Facility: CLINIC | Age: 48
End: 2023-03-28

## 2023-04-04 ENCOUNTER — OFFICE VISIT (OUTPATIENT)
Dept: PHYSICAL THERAPY | Facility: CLINIC | Age: 48
End: 2023-04-04

## 2023-04-04 DIAGNOSIS — G56.02 CARPAL TUNNEL SYNDROME ON LEFT: ICD-10-CM

## 2023-04-04 DIAGNOSIS — M54.12 CERVICAL RADICULITIS: Primary | ICD-10-CM

## 2023-04-04 NOTE — PROGRESS NOTES
"Daily Note     Today's date: 2023  Patient name: Vitaliy Pritchard  : 1975  MRN: [id number]  Referring provider: Rasheed Ford MD  Dx:   Encounter Diagnosis     ICD-10-CM    1  Cervical radiculitis  M54 12       2  Carpal tunnel syndrome on left  G56 02                      Subjective: Patient states she is compliant with her HEP and the exercises feel good  She reports cont pain into her LUE  Objective: See treatment diary below      Assessment: Initiated POC as charted with good tolerance  Verbal and tactile cues to facilitate proper chin tuck with fair carryover cont to work on in future sessions  Added extension which did not provoke or change symptoms into LUE  Good stretch reported with the addition of nerve glides  Open books added to address upper thoracic mobility in relation to CTJ  Patient demonstrated fatigue post treatment and would benefit from continued PT      Plan: Continue per plan of care        Precautions: N/A      Manuals 3/21 4/4           Manual cervical traction LUKAS CRISTI           Grade V thoracic mob LUKAS LUKAS                                     Neuro Re-Ed             Education on POC, diagnosis, and HEP 5'            Cervical retraction 1x10 5\"  1x10 5\" seated           Cervical retraction +ext  1x10 seated           SNAG Rot  10\"x10 B            webslide lower trap holds 1x5 grn 5\"            Median NG  90* abd +wrist ext   20x           Radial NG  20x           DNF  NV                        Ther Ex             Open Book   1x15  5\"                                                                                                      Ther Activity                                       Gait Training                                       Modalities                                            "

## 2023-04-11 ENCOUNTER — APPOINTMENT (OUTPATIENT)
Dept: PHYSICAL THERAPY | Facility: CLINIC | Age: 48
End: 2023-04-11

## 2023-04-25 ENCOUNTER — APPOINTMENT (OUTPATIENT)
Dept: PHYSICAL THERAPY | Facility: CLINIC | Age: 48
End: 2023-04-25

## 2023-06-20 DIAGNOSIS — T14.8XXA MUSCLE STRAIN: ICD-10-CM

## 2023-06-20 RX ORDER — CYCLOBENZAPRINE HCL 5 MG
5 TABLET ORAL
Qty: 90 TABLET | Refills: 0 | Status: SHIPPED | OUTPATIENT
Start: 2023-06-20

## 2023-06-29 ENCOUNTER — APPOINTMENT (OUTPATIENT)
Dept: LAB | Facility: CLINIC | Age: 48
End: 2023-06-29

## 2023-06-29 DIAGNOSIS — Z00.8 HEALTH EXAMINATION IN POPULATION SURVEY: ICD-10-CM

## 2023-06-29 LAB
CHOLEST SERPL-MCNC: 185 MG/DL
EST. AVERAGE GLUCOSE BLD GHB EST-MCNC: 94 MG/DL
HBA1C MFR BLD: 4.9 %
HDLC SERPL-MCNC: 64 MG/DL
LDLC SERPL CALC-MCNC: 99 MG/DL (ref 0–100)
NONHDLC SERPL-MCNC: 121 MG/DL
TRIGL SERPL-MCNC: 109 MG/DL

## 2023-06-29 PROCEDURE — 36415 COLL VENOUS BLD VENIPUNCTURE: CPT

## 2023-06-29 PROCEDURE — 80061 LIPID PANEL: CPT

## 2023-06-29 PROCEDURE — 83036 HEMOGLOBIN GLYCOSYLATED A1C: CPT

## 2023-06-30 ENCOUNTER — APPOINTMENT (OUTPATIENT)
Dept: LAB | Facility: CLINIC | Age: 48
End: 2023-06-30
Payer: COMMERCIAL

## 2023-06-30 DIAGNOSIS — Z00.00 HEALTHCARE MAINTENANCE: ICD-10-CM

## 2023-06-30 DIAGNOSIS — Z00.00 HEALTHCARE MAINTENANCE: Primary | ICD-10-CM

## 2023-06-30 LAB
ALBUMIN SERPL BCP-MCNC: 3.6 G/DL (ref 3.5–5)
ALP SERPL-CCNC: 63 U/L (ref 46–116)
ALT SERPL W P-5'-P-CCNC: 18 U/L (ref 12–78)
ANION GAP SERPL CALCULATED.3IONS-SCNC: 5 MMOL/L
AST SERPL W P-5'-P-CCNC: 16 U/L (ref 5–45)
BASOPHILS # BLD AUTO: 0.08 THOUSANDS/ÂΜL (ref 0–0.1)
BASOPHILS NFR BLD AUTO: 1 % (ref 0–1)
BILIRUB SERPL-MCNC: 0.51 MG/DL (ref 0.2–1)
BUN SERPL-MCNC: 10 MG/DL (ref 5–25)
CALCIUM SERPL-MCNC: 9.6 MG/DL (ref 8.3–10.1)
CHLORIDE SERPL-SCNC: 107 MMOL/L (ref 96–108)
CHOLEST SERPL-MCNC: 192 MG/DL
CO2 SERPL-SCNC: 28 MMOL/L (ref 21–32)
CREAT SERPL-MCNC: 0.86 MG/DL (ref 0.6–1.3)
EOSINOPHIL # BLD AUTO: 0.08 THOUSAND/ÂΜL (ref 0–0.61)
EOSINOPHIL NFR BLD AUTO: 1 % (ref 0–6)
ERYTHROCYTE [DISTWIDTH] IN BLOOD BY AUTOMATED COUNT: 12.9 % (ref 11.6–15.1)
EST. AVERAGE GLUCOSE BLD GHB EST-MCNC: 94 MG/DL
GFR SERPL CREATININE-BSD FRML MDRD: 81 ML/MIN/1.73SQ M
GLUCOSE P FAST SERPL-MCNC: 99 MG/DL (ref 65–99)
HBA1C MFR BLD: 4.9 %
HCT VFR BLD AUTO: 40.1 % (ref 34.8–46.1)
HDLC SERPL-MCNC: 63 MG/DL
HGB BLD-MCNC: 14.1 G/DL (ref 11.5–15.4)
IMM GRANULOCYTES # BLD AUTO: 0.05 THOUSAND/UL (ref 0–0.2)
IMM GRANULOCYTES NFR BLD AUTO: 1 % (ref 0–2)
LDLC SERPL CALC-MCNC: 114 MG/DL (ref 0–100)
LYMPHOCYTES # BLD AUTO: 3.87 THOUSANDS/ÂΜL (ref 0.6–4.47)
LYMPHOCYTES NFR BLD AUTO: 40 % (ref 14–44)
MCH RBC QN AUTO: 32.3 PG (ref 26.8–34.3)
MCHC RBC AUTO-ENTMCNC: 35.2 G/DL (ref 31.4–37.4)
MCV RBC AUTO: 92 FL (ref 82–98)
MONOCYTES # BLD AUTO: 1.14 THOUSAND/ÂΜL (ref 0.17–1.22)
MONOCYTES NFR BLD AUTO: 12 % (ref 4–12)
NEUTROPHILS # BLD AUTO: 4.42 THOUSANDS/ÂΜL (ref 1.85–7.62)
NEUTS SEG NFR BLD AUTO: 45 % (ref 43–75)
NONHDLC SERPL-MCNC: 129 MG/DL
NRBC BLD AUTO-RTO: 0 /100 WBCS
PLATELET # BLD AUTO: 607 THOUSANDS/UL (ref 149–390)
PMV BLD AUTO: 8.9 FL (ref 8.9–12.7)
POTASSIUM SERPL-SCNC: 3.6 MMOL/L (ref 3.5–5.3)
PROT SERPL-MCNC: 7.1 G/DL (ref 6.4–8.4)
RBC # BLD AUTO: 4.37 MILLION/UL (ref 3.81–5.12)
SODIUM SERPL-SCNC: 140 MMOL/L (ref 135–147)
TRIGL SERPL-MCNC: 77 MG/DL
TSH SERPL DL<=0.05 MIU/L-ACNC: 0.78 UIU/ML (ref 0.45–4.5)
WBC # BLD AUTO: 9.64 THOUSAND/UL (ref 4.31–10.16)

## 2023-06-30 PROCEDURE — 36415 COLL VENOUS BLD VENIPUNCTURE: CPT

## 2023-06-30 PROCEDURE — 80061 LIPID PANEL: CPT

## 2023-06-30 PROCEDURE — 80053 COMPREHEN METABOLIC PANEL: CPT

## 2023-06-30 PROCEDURE — 83036 HEMOGLOBIN GLYCOSYLATED A1C: CPT

## 2023-06-30 PROCEDURE — 85025 COMPLETE CBC W/AUTO DIFF WBC: CPT

## 2023-06-30 PROCEDURE — 84443 ASSAY THYROID STIM HORMONE: CPT

## 2023-07-03 ENCOUNTER — OFFICE VISIT (OUTPATIENT)
Dept: FAMILY MEDICINE CLINIC | Facility: CLINIC | Age: 48
End: 2023-07-03

## 2023-07-03 VITALS
HEIGHT: 66 IN | SYSTOLIC BLOOD PRESSURE: 122 MMHG | HEART RATE: 88 BPM | BODY MASS INDEX: 32.98 KG/M2 | OXYGEN SATURATION: 97 % | DIASTOLIC BLOOD PRESSURE: 74 MMHG | WEIGHT: 205.2 LBS

## 2023-07-03 DIAGNOSIS — F41.9 ANXIETY: Primary | ICD-10-CM

## 2023-07-03 DIAGNOSIS — Z00.00 ANNUAL PHYSICAL EXAM: ICD-10-CM

## 2023-07-03 DIAGNOSIS — F33.9 DEPRESSION, RECURRENT (HCC): ICD-10-CM

## 2023-07-03 DIAGNOSIS — E66.09 CLASS 1 OBESITY DUE TO EXCESS CALORIES WITHOUT SERIOUS COMORBIDITY WITH BODY MASS INDEX (BMI) OF 33.0 TO 33.9 IN ADULT: ICD-10-CM

## 2023-07-03 PROBLEM — K29.00 ACUTE SUPERFICIAL GASTRITIS WITHOUT HEMORRHAGE: Status: RESOLVED | Noted: 2021-05-05 | Resolved: 2023-07-03

## 2023-07-03 PROBLEM — E66.811 CLASS 1 OBESITY DUE TO EXCESS CALORIES WITHOUT SERIOUS COMORBIDITY WITH BODY MASS INDEX (BMI) OF 33.0 TO 33.9 IN ADULT: Status: ACTIVE | Noted: 2023-07-03

## 2023-07-03 PROBLEM — R09.89 GLOBUS PHARYNGEUS: Status: RESOLVED | Noted: 2021-01-27 | Resolved: 2023-07-03

## 2023-07-03 PROBLEM — R09.A2 GLOBUS PHARYNGEUS: Status: RESOLVED | Noted: 2021-01-27 | Resolved: 2023-07-03

## 2023-07-03 PROBLEM — F32.1 MODERATE MAJOR DEPRESSION, SINGLE EPISODE (HCC): Status: RESOLVED | Noted: 2021-04-30 | Resolved: 2023-07-03

## 2023-07-03 RX ORDER — BUSPIRONE HYDROCHLORIDE 5 MG/1
5 TABLET ORAL 2 TIMES DAILY
Qty: 60 TABLET | Refills: 0 | Status: SHIPPED | OUTPATIENT
Start: 2023-07-03

## 2023-07-03 RX ORDER — PREDNISONE 20 MG/1
TABLET ORAL
COMMUNITY
Start: 2023-04-02 | End: 2023-07-03

## 2023-07-03 NOTE — ASSESSMENT & PLAN NOTE
She has only been taking Wellbutrin and Prozac as needed. Advised to stop these medications if not taking on a daily basis. We will give BuSpar to take as needed.

## 2023-07-03 NOTE — PROGRESS NOTES
[address] [facility]    NAME: Rowena Vasques  AGE: 56 y.o. SEX: female  : 1975     DATE: 7/3/2023     Assessment and Plan:     Problem List Items Addressed This Visit        Other    Anxiety - Primary     She has only been taking Wellbutrin and Prozac as needed. Advised to stop these medications if not taking on a daily basis. We will give BuSpar to take as needed. Relevant Medications    busPIRone (BUSPAR) 5 mg tablet    Depression, recurrent ([address])     Doing relatively well off of medications. Relevant Medications    busPIRone (BUSPAR) 5 mg tablet    liraglutide (SAXENDA) injection    Class 1 obesity due to excess calories without serious comorbidity with body mass index (BMI) of 33.0 to 33.9 in adult     We will start on Saxenda daily. Advised food diary and daily exercise. Relevant Medications    liraglutide (SAXENDA) injection   Other Visit Diagnoses     Annual physical exam              Immunizations and preventive care screenings were discussed with patient today. Appropriate education was printed on patient's after visit summary. Counseling:  · Exercise: the importance of regular exercise/physical activity was discussed. Recommend exercise 3-5 times per week for at least 30 minutes. No follow-ups on file. Chief Complaint:     Chief Complaint   Patient presents with   • Physical Exam      History of Present Illness:     Adult Annual Physical   Patient here for a comprehensive physical exam. The patient reports no problems. Diet and Physical Activity  · Diet/Nutrition: well balanced diet. · Exercise: 3-4 times a week on average.       Depression Screening  PHQ-2/9 Depression Screening    Little interest or pleasure in doing things: 0 - not at all  Feeling down, depressed, or hopeless: 0 - not at all  Trouble falling or staying asleep, or sleeping too much: 0 - not at all  Feeling tired or having little energy: 0 - not at all  Poor appetite or overeatin - several days  Feeling bad about yourself - or that you are a failure or have let yourself or your family down: 1 - several days  Trouble concentrating on things, such as reading the newspaper or watching television: 0 - not at all  Moving or speaking so slowly that other people could have noticed. Or the opposite - being so fidgety or restless that you have been moving around a lot more than usual: 0 - not at all  Thoughts that you would be better off dead, or of hurting yourself in some way: 0 - not at all  PHQ-9 Score: 2   PHQ-9 Interpretation: No or Minimal depression        General Health  · Sleep: sleeps well. · Hearing: decreased - bilateral.  · Vision: wears glasses. · Dental: regular dental visits. /GYN Health  · Patient is: perimenopausal  · Last menstrual period: hyster  · Contraceptive method: hyster.      Review of Systems:     Review of Systems   Past Medical History:     Past Medical History:   Diagnosis Date   • Anxiety    • Depression    • Fibroid    • Head injury    • Hereditary spherocytosis ([address])    • Moderate major depression, single episode ([address]) 2021      Past Surgical History:     Past Surgical History:   Procedure Laterality Date   • COLONOSCOPY     • HEMORRHOID SURGERY N/A 2023    Procedure: HEMORRHOIDECTOMY EXCISION;  Surgeon: Sari Guevara MD;  Location: [facility];  Service: General   • HYSTERECTOMY N/A    • SPLENECTOMY     • WISDOM TOOTH EXTRACTION        Social History:     Social History     Socioeconomic History   • Marital status: /Civil Union     Spouse name: None   • Number of children: None   • Years of education: None   • Highest education level: None   Occupational History   • None   Tobacco Use   • Smoking status: Never   • Smokeless tobacco: Never   Vaping Use   • Vaping Use: Never used   Substance and Sexual Activity   • Alcohol use: Not Currently   • Drug use: No   • Sexual activity: Yes     Partners: Male     Birth control/protection: Surgical   Other Topics Concern   • None   Social History Narrative   • None     Social Determinants of Health     Financial Resource Strain: Not on file   Food Insecurity: Not on file   Transportation Needs: Not on file   Physical Activity: Not on file   Stress: Not on file   Social Connections: Not on file   Intimate Partner Violence: Not on file   Housing Stability: Not on file      Family History:     Family History   Problem Relation Age of Onset   • Arthritis Mother    • Hypertension Family    • ADD / ADHD Son    • Learning disabilities Son    • Breast cancer Neg Hx    • Colon cancer Neg Hx    • Ovarian cancer Neg Hx    • Uterine cancer Neg Hx    • Cervical cancer Neg Hx       Current Medications:     Current Outpatient Medications   Medication Sig Dispense Refill   • Biotin 1000 MCG tablet Take 1,000 mcg by mouth daily     • busPIRone (BUSPAR) 5 mg tablet Take 1 tablet (5 mg total) by mouth 2 (two) times a day 60 tablet 0   • Cholecalciferol (VITAMIN D3) 5000 units CAPS Take by mouth     • clotrimazole-betamethasone (LOTRISONE) 1-0.05 % cream Apply topically 2 (two) times a day prn 45 g 1   • cyclobenzaprine (FLEXERIL) 5 mg tablet Take 1 tablet (5 mg total) by mouth daily at bedtime 90 tablet 0   • docusate sodium (COLACE) 100 mg capsule Take 1 capsule (100 mg total) by mouth 2 (two) times a day 30 capsule 0   • EVENING PRIMROSE OIL PO Take by mouth     • fluticasone (FLONASE) 50 mcg/act nasal spray 2 sprays into each nostril daily 16 g 3   • FOLIC ACID PO Take by mouth     • ibuprofen (MOTRIN) 800 mg tablet Take 1 tablet (800 mg total) by mouth every 8 (eight) hours as needed for mild pain 30 tablet 0   • liraglutide (SAXENDA) injection Inject 0.1 mL (0.6 mg total) under the skin daily 3 mL 0   • multivitamin (THERAGRAN) TABS Take 1 tablet by mouth daily     • Nerve Stimulator (Pro Comfort Tens Unit) JOSIAH 1 Device by Does not apply route daily 1 Device 0   • simethicone (MYLICON,GAS-X) 032 MG capsule Take 1 capsule (180 mg total) by mouth 2 (two) times a day 20 capsule 0     No current facility-administered medications for this visit. Allergies: Allergies   Allergen Reactions   • Latex Hives and Rash     Contact-GLOVES   • Shellfish Allergy - Food Allergy Hives, Rash and Throat Swelling      Physical Exam:     /74 (BP Location: Left arm, Patient Position: Sitting, Cuff Size: Standard)   Pulse 88   Ht 5' 6" (1.676 m)   Wt 93.1 kg (205 lb 3.2 oz)   SpO2 97%   BMI 33.12 kg/m²     Physical Exam  Vitals and nursing note reviewed. Constitutional:       General: She is not in acute distress. Appearance: She is well-developed. HENT:      Head: Normocephalic and atraumatic. Right Ear: Tympanic membrane normal.      Left Ear: Tympanic membrane normal.      Nose: No congestion. Eyes:      Conjunctiva/sclera: Conjunctivae normal.   Cardiovascular:      Rate and Rhythm: Normal rate and regular rhythm. Heart sounds: No murmur heard. Pulmonary:      Effort: Pulmonary effort is normal. No respiratory distress. Breath sounds: Normal breath sounds. Abdominal:      Palpations: Abdomen is soft. Tenderness: There is no abdominal tenderness. Musculoskeletal:         General: No swelling. Cervical back: Neck supple. Skin:     General: Skin is warm and dry. Capillary Refill: Capillary refill takes less than 2 seconds. Neurological:      Mental Status: She is alert and oriented to person, place, and time.    Psychiatric:         Mood and Affect: Mood normal.          Aleksandra Emerson, [address] [facility]

## 2023-07-11 ENCOUNTER — TELEPHONE (OUTPATIENT)
Dept: FAMILY MEDICINE CLINIC | Facility: CLINIC | Age: 48
End: 2023-07-11

## 2023-08-01 DIAGNOSIS — E66.09 CLASS 1 OBESITY DUE TO EXCESS CALORIES WITHOUT SERIOUS COMORBIDITY WITH BODY MASS INDEX (BMI) OF 33.0 TO 33.9 IN ADULT: Primary | ICD-10-CM

## 2023-08-16 ENCOUNTER — TELEPHONE (OUTPATIENT)
Dept: OBGYN CLINIC | Facility: CLINIC | Age: 48
End: 2023-08-16

## 2023-08-16 DIAGNOSIS — Z12.31 ENCOUNTER FOR SCREENING MAMMOGRAM FOR MALIGNANT NEOPLASM OF BREAST: Primary | ICD-10-CM

## 2023-09-01 DIAGNOSIS — E66.09 CLASS 1 OBESITY DUE TO EXCESS CALORIES WITHOUT SERIOUS COMORBIDITY WITH BODY MASS INDEX (BMI) OF 33.0 TO 33.9 IN ADULT: Primary | ICD-10-CM

## 2023-09-14 DIAGNOSIS — E66.09 CLASS 1 OBESITY DUE TO EXCESS CALORIES WITHOUT SERIOUS COMORBIDITY WITH BODY MASS INDEX (BMI) OF 33.0 TO 33.9 IN ADULT: Primary | ICD-10-CM

## 2023-09-14 RX ORDER — PHENTERMINE HYDROCHLORIDE 37.5 MG/1
37.5 TABLET ORAL DAILY
Qty: 30 TABLET | Refills: 0 | Status: SHIPPED | OUTPATIENT
Start: 2023-09-14

## 2023-10-07 DIAGNOSIS — E66.09 CLASS 1 OBESITY DUE TO EXCESS CALORIES WITHOUT SERIOUS COMORBIDITY WITH BODY MASS INDEX (BMI) OF 33.0 TO 33.9 IN ADULT: ICD-10-CM

## 2023-10-10 RX ORDER — PHENTERMINE HYDROCHLORIDE 37.5 MG/1
37.5 TABLET ORAL DAILY
Qty: 30 TABLET | Refills: 0 | Status: SHIPPED | OUTPATIENT
Start: 2023-10-10

## 2023-10-18 ENCOUNTER — HOSPITAL ENCOUNTER (OUTPATIENT)
Age: 48
Discharge: HOME | End: 2023-10-18
Payer: COMMERCIAL

## 2023-10-18 VITALS — HEIGHT: 66 IN | WEIGHT: 205 LBS | BODY MASS INDEX: 32.95 KG/M2

## 2023-10-18 DIAGNOSIS — Z12.31 ENCOUNTER FOR SCREENING MAMMOGRAM FOR MALIGNANT NEOPLASM OF BREAST: ICD-10-CM

## 2023-10-18 PROCEDURE — 77067 SCR MAMMO BI INCL CAD: CPT

## 2023-10-18 PROCEDURE — 77063 BREAST TOMOSYNTHESIS BI: CPT

## 2023-11-02 ENCOUNTER — TELEPHONE (OUTPATIENT)
Dept: FAMILY MEDICINE CLINIC | Facility: CLINIC | Age: 48
End: 2023-11-02

## 2023-11-02 NOTE — TELEPHONE ENCOUNTER
Left message to call back to see if patient is still using saxenda. There is a renewal form.  I see patient is taking phentermine

## 2023-11-07 DIAGNOSIS — E66.09 CLASS 1 OBESITY DUE TO EXCESS CALORIES WITHOUT SERIOUS COMORBIDITY WITH BODY MASS INDEX (BMI) OF 33.0 TO 33.9 IN ADULT: ICD-10-CM

## 2023-11-07 RX ORDER — PHENTERMINE HYDROCHLORIDE 37.5 MG/1
37.5 TABLET ORAL DAILY
Qty: 30 TABLET | Refills: 0 | Status: SHIPPED | OUTPATIENT
Start: 2023-11-07

## 2023-11-28 ENCOUNTER — PATIENT MESSAGE (OUTPATIENT)
Dept: OBGYN CLINIC | Facility: CLINIC | Age: 48
End: 2023-11-28

## 2023-11-29 ENCOUNTER — TELEPHONE (OUTPATIENT)
Dept: OBGYN CLINIC | Facility: CLINIC | Age: 48
End: 2023-11-29

## 2023-11-29 NOTE — TELEPHONE ENCOUNTER
----- Message from Alonzo Vargas sent at 11/28/2023  5:40 PM EST -----  Regarding: Appointment   Contact: [phone]  Good Evening Team, I know Akosua Chicas is not in the office. This was only way leave a message. Mychart I couldn’t reschedule my appt for tomorrow. I need to reschedule please for this year. Can the girls please give me a phone at [phone].  Thank you so much!!

## 2023-11-29 NOTE — TELEPHONE ENCOUNTER
Attempted to contact Ms. Vargas to reschedule her appointment. Unfortunately the number we have on file is a non working number.

## 2023-12-16 DIAGNOSIS — E66.09 CLASS 1 OBESITY DUE TO EXCESS CALORIES WITHOUT SERIOUS COMORBIDITY WITH BODY MASS INDEX (BMI) OF 33.0 TO 33.9 IN ADULT: ICD-10-CM

## 2023-12-18 RX ORDER — PHENTERMINE HYDROCHLORIDE 37.5 MG/1
37.5 TABLET ORAL DAILY
Qty: 30 TABLET | Refills: 0 | Status: SHIPPED | OUTPATIENT
Start: 2023-12-18

## 2024-02-07 DIAGNOSIS — E66.09 CLASS 1 OBESITY DUE TO EXCESS CALORIES WITHOUT SERIOUS COMORBIDITY WITH BODY MASS INDEX (BMI) OF 33.0 TO 33.9 IN ADULT: ICD-10-CM

## 2024-02-07 DIAGNOSIS — T14.8XXA MUSCLE STRAIN: ICD-10-CM

## 2024-02-08 RX ORDER — CYCLOBENZAPRINE HCL 5 MG
5 TABLET ORAL
Qty: 90 TABLET | Refills: 0 | Status: SHIPPED | OUTPATIENT
Start: 2024-02-08

## 2024-02-08 RX ORDER — PHENTERMINE HYDROCHLORIDE 37.5 MG/1
37.5 TABLET ORAL DAILY
Qty: 30 TABLET | Refills: 0 | Status: SHIPPED | OUTPATIENT
Start: 2024-02-08

## 2024-03-11 DIAGNOSIS — E66.09 CLASS 1 OBESITY DUE TO EXCESS CALORIES WITHOUT SERIOUS COMORBIDITY WITH BODY MASS INDEX (BMI) OF 33.0 TO 33.9 IN ADULT: ICD-10-CM

## 2024-03-11 RX ORDER — PHENTERMINE HYDROCHLORIDE 37.5 MG/1
37.5 TABLET ORAL DAILY
Qty: 30 TABLET | Refills: 0 | Status: SHIPPED | OUTPATIENT
Start: 2024-03-11

## 2024-03-28 ENCOUNTER — TELEPHONE (OUTPATIENT)
Age: 49
End: 2024-03-28

## 2024-03-28 DIAGNOSIS — Z11.3 SCREENING FOR STDS (SEXUALLY TRANSMITTED DISEASES): Primary | ICD-10-CM

## 2024-03-28 NOTE — TELEPHONE ENCOUNTER
Call to patient to inquire what type of blood work she is looking to be ordered.     Her yearly STD screening    Also asking for hormones also cause she feels she is going through menopause.

## 2024-04-11 ENCOUNTER — TELEPHONE (OUTPATIENT)
Age: 49
End: 2024-04-11

## 2024-04-11 ENCOUNTER — APPOINTMENT (OUTPATIENT)
Dept: LAB | Facility: CLINIC | Age: 49
End: 2024-04-11
Payer: COMMERCIAL

## 2024-04-11 DIAGNOSIS — E66.09 CLASS 1 OBESITY DUE TO EXCESS CALORIES WITHOUT SERIOUS COMORBIDITY WITH BODY MASS INDEX (BMI) OF 33.0 TO 33.9 IN ADULT: ICD-10-CM

## 2024-04-11 DIAGNOSIS — Z11.3 SCREENING FOR STDS (SEXUALLY TRANSMITTED DISEASES): Primary | ICD-10-CM

## 2024-04-11 DIAGNOSIS — Z11.3 SCREENING FOR STDS (SEXUALLY TRANSMITTED DISEASES): ICD-10-CM

## 2024-04-11 LAB
HBV SURFACE AG SER QL: NORMAL
HCV AB SER QL: NORMAL
HIV 1+2 AB+HIV1 P24 AG SERPL QL IA: NORMAL
HIV 2 AB SERPL QL IA: NORMAL
HIV1 AB SERPL QL IA: NORMAL
HIV1 P24 AG SERPL QL IA: NORMAL
TREPONEMA PALLIDUM IGG+IGM AB [PRESENCE] IN SERUM OR PLASMA BY IMMUNOASSAY: NORMAL

## 2024-04-11 PROCEDURE — 87340 HEPATITIS B SURFACE AG IA: CPT

## 2024-04-11 PROCEDURE — 87591 N.GONORRHOEAE DNA AMP PROB: CPT

## 2024-04-11 PROCEDURE — 87491 CHLMYD TRACH DNA AMP PROBE: CPT

## 2024-04-11 PROCEDURE — 86803 HEPATITIS C AB TEST: CPT

## 2024-04-11 PROCEDURE — 36415 COLL VENOUS BLD VENIPUNCTURE: CPT

## 2024-04-11 PROCEDURE — 87389 HIV-1 AG W/HIV-1&-2 AB AG IA: CPT

## 2024-04-11 PROCEDURE — 86780 TREPONEMA PALLIDUM: CPT

## 2024-04-11 RX ORDER — PHENTERMINE HYDROCHLORIDE 37.5 MG/1
37.5 TABLET ORAL DAILY
Qty: 30 TABLET | Refills: 0 | Status: SHIPPED | OUTPATIENT
Start: 2024-04-11

## 2024-04-11 NOTE — TELEPHONE ENCOUNTER
Patient called stating she went for her blood work this AM,  including STD testing. Patient is requesting a urinalysis be added to her urine g/c that was provided. Message routed to provider.

## 2024-04-12 DIAGNOSIS — Z00.6 ENCOUNTER FOR EXAMINATION FOR NORMAL COMPARISON OR CONTROL IN CLINICAL RESEARCH PROGRAM: ICD-10-CM

## 2024-04-12 LAB
C TRACH DNA SPEC QL NAA+PROBE: NEGATIVE
N GONORRHOEA DNA SPEC QL NAA+PROBE: NEGATIVE

## 2024-05-15 DIAGNOSIS — T14.8XXA MUSCLE STRAIN: ICD-10-CM

## 2024-05-15 DIAGNOSIS — E66.09 CLASS 1 OBESITY DUE TO EXCESS CALORIES WITHOUT SERIOUS COMORBIDITY WITH BODY MASS INDEX (BMI) OF 33.0 TO 33.9 IN ADULT: ICD-10-CM

## 2024-05-16 RX ORDER — PHENTERMINE HYDROCHLORIDE 37.5 MG/1
37.5 TABLET ORAL DAILY
Qty: 30 TABLET | Refills: 0 | Status: SHIPPED | OUTPATIENT
Start: 2024-05-16

## 2024-05-16 RX ORDER — CYCLOBENZAPRINE HCL 5 MG
5 TABLET ORAL
Qty: 90 TABLET | Refills: 0 | Status: SHIPPED | OUTPATIENT
Start: 2024-05-16

## 2024-06-17 ENCOUNTER — ANNUAL EXAM (OUTPATIENT)
Dept: OBGYN CLINIC | Facility: CLINIC | Age: 49
End: 2024-06-17
Payer: COMMERCIAL

## 2024-06-17 VITALS
WEIGHT: 200 LBS | HEIGHT: 66 IN | BODY MASS INDEX: 32.14 KG/M2 | DIASTOLIC BLOOD PRESSURE: 82 MMHG | SYSTOLIC BLOOD PRESSURE: 114 MMHG

## 2024-06-17 DIAGNOSIS — E66.09 CLASS 1 OBESITY DUE TO EXCESS CALORIES WITHOUT SERIOUS COMORBIDITY WITH BODY MASS INDEX (BMI) OF 33.0 TO 33.9 IN ADULT: ICD-10-CM

## 2024-06-17 DIAGNOSIS — Z01.419 ENCOUNTER FOR GYNECOLOGICAL EXAMINATION WITHOUT ABNORMAL FINDING: Primary | ICD-10-CM

## 2024-06-17 DIAGNOSIS — Z90.710 H/O: HYSTERECTOMY: ICD-10-CM

## 2024-06-17 DIAGNOSIS — Z12.31 ENCOUNTER FOR SCREENING MAMMOGRAM FOR MALIGNANT NEOPLASM OF BREAST: ICD-10-CM

## 2024-06-17 PROBLEM — E66.811 CLASS 1 OBESITY DUE TO EXCESS CALORIES WITHOUT SERIOUS COMORBIDITY WITH BODY MASS INDEX (BMI) OF 33.0 TO 33.9 IN ADULT: Status: RESOLVED | Noted: 2023-07-03 | Resolved: 2024-06-17

## 2024-06-17 PROCEDURE — S0612 ANNUAL GYNECOLOGICAL EXAMINA: HCPCS | Performed by: NURSE PRACTITIONER

## 2024-06-17 RX ORDER — MELOXICAM 15 MG/1
1 TABLET ORAL DAILY
COMMUNITY
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
## 2024-06-18 RX ORDER — PHENTERMINE HYDROCHLORIDE 37.5 MG/1
37.5 TABLET ORAL DAILY
Qty: 30 TABLET | Refills: 0 | Status: SHIPPED | OUTPATIENT
Start: 2024-06-18

## 2024-06-21 ENCOUNTER — TELEPHONE (OUTPATIENT)
Age: 49
End: 2024-06-21

## 2024-06-21 NOTE — TELEPHONE ENCOUNTER
[place] for  phentermine (ADIPEX-P) 37.5 MG tablet Approved   Date(s) approved 6/21/24-9/21/24  Case #    Patient advised by [x] MyChart Message                      [x] Phone call       Pharmacy advised by [x]Fax                                     []Phone call    Approval letter scanned into Media Yes

## 2024-06-21 NOTE — TELEPHONE ENCOUNTER
PA for phentermine (ADIPEX-P) 37.5 MG tablet     Submitted via    []CMM-KEY   [x]Talkbits-Case ID # [id number]   []Faxed to plan   []Other website   []Phone call Case ID #     Office notes sent, clinical questions answered. Awaiting determination    Turnaround time for your insurance to make a decision on your Prior Authorization can take 7-21 business days.

## 2024-07-26 ENCOUNTER — APPOINTMENT (OUTPATIENT)
Dept: LAB | Facility: CLINIC | Age: 49
End: 2024-07-26

## 2024-07-26 DIAGNOSIS — Z00.8 HEALTH EXAMINATION IN POPULATION SURVEY: ICD-10-CM

## 2024-07-26 DIAGNOSIS — Z00.6 ENCOUNTER FOR EXAMINATION FOR NORMAL COMPARISON OR CONTROL IN CLINICAL RESEARCH PROGRAM: ICD-10-CM

## 2024-07-26 LAB
CHOLEST SERPL-MCNC: 169 MG/DL
EST. AVERAGE GLUCOSE BLD GHB EST-MCNC: 94 MG/DL
HBA1C MFR BLD: 4.9 %
HDLC SERPL-MCNC: 52 MG/DL
LDLC SERPL CALC-MCNC: 84 MG/DL (ref 0–100)
NONHDLC SERPL-MCNC: 117 MG/DL
TRIGL SERPL-MCNC: 163 MG/DL

## 2024-07-26 PROCEDURE — 80061 LIPID PANEL: CPT

## 2024-07-26 PROCEDURE — 83036 HEMOGLOBIN GLYCOSYLATED A1C: CPT

## 2024-07-26 PROCEDURE — 36415 COLL VENOUS BLD VENIPUNCTURE: CPT

## 2024-07-31 ENCOUNTER — TELEMEDICINE (OUTPATIENT)
Dept: FAMILY MEDICINE CLINIC | Facility: CLINIC | Age: 49
End: 2024-07-31
Payer: COMMERCIAL

## 2024-07-31 ENCOUNTER — CLINICAL SUPPORT (OUTPATIENT)
Dept: FAMILY MEDICINE CLINIC | Facility: CLINIC | Age: 49
End: 2024-07-31
Payer: COMMERCIAL

## 2024-07-31 DIAGNOSIS — R68.83 CHILLS: ICD-10-CM

## 2024-07-31 DIAGNOSIS — R52 BODY ACHES: ICD-10-CM

## 2024-07-31 DIAGNOSIS — U07.1 COVID-19: ICD-10-CM

## 2024-07-31 DIAGNOSIS — J02.9 SORE THROAT: Primary | ICD-10-CM

## 2024-07-31 DIAGNOSIS — J02.0 STREP PHARYNGITIS: Primary | ICD-10-CM

## 2024-07-31 LAB
S PYO AG THROAT QL: POSITIVE
SARS-COV-2 AG UPPER RESP QL IA: POSITIVE
VALID CONTROL: ABNORMAL

## 2024-07-31 PROCEDURE — 87880 STREP A ASSAY W/OPTIC: CPT

## 2024-07-31 PROCEDURE — 99213 OFFICE O/P EST LOW 20 MIN: CPT | Performed by: FAMILY MEDICINE

## 2024-07-31 PROCEDURE — 87811 SARS-COV-2 COVID19 W/OPTIC: CPT

## 2024-07-31 RX ORDER — DEXTROMETHORPHAN HYDROBROMIDE AND PROMETHAZINE HYDROCHLORIDE 15; 6.25 MG/5ML; MG/5ML
5 SYRUP ORAL 4 TIMES DAILY PRN
Qty: 180 ML | Refills: 0 | Status: SHIPPED | OUTPATIENT
Start: 2024-07-31

## 2024-07-31 RX ORDER — AMOXICILLIN 500 MG/1
500 CAPSULE ORAL EVERY 8 HOURS SCHEDULED
Qty: 30 CAPSULE | Refills: 0 | Status: SHIPPED | OUTPATIENT
Start: 2024-07-31 | End: 2024-08-10

## 2024-07-31 RX ORDER — NIRMATRELVIR AND RITONAVIR 300-100 MG
3 KIT ORAL 2 TIMES DAILY
Qty: 30 TABLET | Refills: 0 | Status: SHIPPED | OUTPATIENT
Start: 2024-07-31 | End: 2024-08-05

## 2024-07-31 NOTE — PROGRESS NOTES
COVID-19 Outpatient Progress Note  Name: Radha Vargas      : 1975      MRN: [id number]  Encounter Provider: BRITTANY Boyle  Encounter Date: 2024   Encounter department: [facility] [address]    Assessment & Plan   1. Strep pharyngitis  -     amoxicillin (AMOXIL) 500 mg capsule; Take 1 capsule (500 mg total) by mouth every 8 (eight) hours for 10 days  -     promethazine-dextromethorphan (PHENERGAN-DM) 6.25-15 mg/5 mL oral syrup; Take 5 mL by mouth 4 (four) times a day as needed for cough  2. COVID-19  -     nirmatrelvir & ritonavir (Paxlovid, 300/100,) tablet therapy pack; Take 3 tablets by mouth 2 (two) times a day for 5 days Take 2 nirmatrelvir tablets + 1 ritonavir tablet together per dose  -     promethazine-dextromethorphan (PHENERGAN-DM) 6.25-15 mg/5 mL oral syrup; Take 5 mL by mouth 4 (four) times a day as needed for cough    Disposition:     Discussed symptom directed medication options with patient. Discussed vitamin D, vitamin C, and/or zinc supplementation with patient.   COVID-positive  Rapid strep positive    Patient meets criteria for Paxlovid and they have been counseled appropriately regarding risks, benefits, side effects, and alternative treatment options. After discussion, patient agrees to treatment.    Possible side effects of Paxlovid?    Possible side effects of Paxlovid are:  - Liver Problems. Notify us right away if you start to experience loss of appetite, yellowing of your skin and the whites of eyes (jaundice), dark-colored urine, pale colored stools and itchy skin, stomach area (abdominal) pain.  - Resistance to HIV Medicines. If you have untreated HIV infection, Paxlovid may lead to some HIV medicines not working as well in the future.  - Other possible side effects include: altered sense of taste, diarrhea, high blood pressure, or muscle aches.    I have spent a total time of 20 minutes on the day of the encounter for this  patient including discussing diagnostic results, discussing prognosis, risks and benefits of treatment options, instructions for management, patient and family education, importance of treatment compliance, risk factor reductions, impressions, counseling/coordination of care, documenting in the medical record, reviewing/ordering tests, medicine, procedures and obtaining or reviewing history. Patient informed of results, Paxlovid amoxicillin sent to pharmacy, stressed the importance of rest hydration call for any changes worsening         Encounter provider: BRITTANY Boyle     Provider located at: [address] [place] [address]  [address] [place] [id number]  [phone]     Recent Visits  No visits were found meeting these conditions.  Showing recent visits within past 7 days and meeting all other requirements  Today's Visits  Date Type Provider Dept   07/31/24 Telemedicine BRITTANY Boyle [address]   Showing today's visits and meeting all other requirements  Future Appointments  No visits were found meeting these conditions.  Showing future appointments within next 150 days and meeting all other requirements    History of Present Illness        Patient agrees to participate in a virtual check in via telephone or video visit instead of presenting to the office to address urgent/immediate medical needs. Patient is aware this is a billable service. She acknowledged consent and understanding of privacy and security of the video platform. The patient has agreed to participate and understands they can discontinue the visit at any time.    After connecting through Televideo, the patient was identified by name and date of birth. Radha Vargas was informed that this was a telemedicine visit and that the exam was being conducted confidentially over secure lines. My office door was closed. No one else was in the room.  Radha Vargas acknowledged consent and understanding of privacy and security of the telemedicine visit. I informed the patient that I have reviewed her record in Epic and presented the opportunity for her to ask any questions regarding the visit today. The patient agreed to participate.     Verification of patient location:  Patient is located in the following state in which I hold an active license: [place]    Subjective:   Radha Vargas is a 48 y.o. female who is concerned about COVID-19. Patient's symptoms include fever, chills, fatigue, sore throat, cough and myalgias.     - Date of symptom onset: 7/30/2024      COVID-19 vaccination status: Fully vaccinated with booster    Exposure:   Contact with a person who is under investigation (PUI) for or who is positive for COVID-19 within the last 14 days?: Yes    Hospitalized recently for fever and/or lower respiratory symptoms?: No      Currently a healthcare worker that is involved in direct patient care?: Yes      Works in a special setting where the risk of COVID-19 transmission may be high? (this may include long-term care, correctional and longterm facilities; homeless shelters; assisted-living facilities and group homes.): No      Resident in a special setting where the risk of COVID-19 transmission may be high? (this may include long-term care, correctional and longterm facilities; homeless shelters; assisted-living facilities and group homes.): No      Lab Results   Component Value Date    SARSCOV2 Negative 11/11/2022    SARSCOV2 Not Detected 05/04/2020    SARSCOVAG Positive (A) 07/31/2024       Review of Systems   Constitutional:  Positive for chills, fatigue and fever.   HENT:  Positive for sore throat.    Respiratory:  Positive for cough.    Musculoskeletal:  Positive for myalgias.     Objective     There were no vitals taken for this visit.    Physical Exam  Constitutional:       General: She is not in acute distress.     Appearance: She is ill-appearing. She  is not toxic-appearing.      Comments: Presently at work site   Pulmonary:      Effort: Pulmonary effort is normal. No respiratory distress.   Neurological:      Mental Status: She is oriented to person, place, and time.

## 2024-08-07 LAB
APOB+LDLR+PCSK9 GENE MUT ANL BLD/T: NOT DETECTED
BRCA1+BRCA2 DEL+DUP + FULL MUT ANL BLD/T: NOT DETECTED
MLH1+MSH2+MSH6+PMS2 GN DEL+DUP+FUL M: NOT DETECTED

## 2024-10-17 ENCOUNTER — OFFICE VISIT (OUTPATIENT)
Dept: FAMILY MEDICINE CLINIC | Facility: CLINIC | Age: 49
End: 2024-10-17
Payer: COMMERCIAL

## 2024-10-17 VITALS
OXYGEN SATURATION: 97 % | HEIGHT: 66 IN | HEART RATE: 98 BPM | BODY MASS INDEX: 32.62 KG/M2 | WEIGHT: 203 LBS | SYSTOLIC BLOOD PRESSURE: 110 MMHG | DIASTOLIC BLOOD PRESSURE: 80 MMHG

## 2024-10-17 DIAGNOSIS — E66.09 CLASS 1 OBESITY DUE TO EXCESS CALORIES WITHOUT SERIOUS COMORBIDITY WITH BODY MASS INDEX (BMI) OF 32.0 TO 32.9 IN ADULT: ICD-10-CM

## 2024-10-17 DIAGNOSIS — F33.9 DEPRESSION, RECURRENT (HCC): ICD-10-CM

## 2024-10-17 DIAGNOSIS — Z00.00 ANNUAL PHYSICAL EXAM: ICD-10-CM

## 2024-10-17 DIAGNOSIS — R11.0 NAUSEA: ICD-10-CM

## 2024-10-17 DIAGNOSIS — R30.0 DYSURIA: Primary | ICD-10-CM

## 2024-10-17 DIAGNOSIS — E66.811 CLASS 1 OBESITY DUE TO EXCESS CALORIES WITHOUT SERIOUS COMORBIDITY WITH BODY MASS INDEX (BMI) OF 32.0 TO 32.9 IN ADULT: ICD-10-CM

## 2024-10-17 DIAGNOSIS — M25.532 LEFT WRIST PAIN: ICD-10-CM

## 2024-10-17 DIAGNOSIS — T14.8XXA MUSCLE STRAIN: ICD-10-CM

## 2024-10-17 DIAGNOSIS — Z23 NEED FOR INFLUENZA VACCINATION: ICD-10-CM

## 2024-10-17 DIAGNOSIS — Z00.00 HEALTHCARE MAINTENANCE: ICD-10-CM

## 2024-10-17 DIAGNOSIS — F41.9 ANXIETY: ICD-10-CM

## 2024-10-17 DIAGNOSIS — H91.93 HEARING PROBLEM OF BOTH EARS: ICD-10-CM

## 2024-10-17 LAB
SL AMB  POCT GLUCOSE, UA: ABNORMAL
SL AMB LEUKOCYTE ESTERASE,UA: ABNORMAL
SL AMB POCT BILIRUBIN,UA: ABNORMAL
SL AMB POCT BLOOD,UA: ABNORMAL
SL AMB POCT CLARITY,UA: CLEAR
SL AMB POCT COLOR,UA: YELLOW
SL AMB POCT KETONES,UA: ABNORMAL
SL AMB POCT NITRITE,UA: ABNORMAL
SL AMB POCT PH,UA: 6
SL AMB POCT SPECIFIC GRAVITY,UA: 1.02
SL AMB POCT URINE PROTEIN: ABNORMAL
SL AMB POCT UROBILINOGEN: ABNORMAL

## 2024-10-17 PROCEDURE — 87086 URINE CULTURE/COLONY COUNT: CPT | Performed by: NURSE PRACTITIONER

## 2024-10-17 PROCEDURE — 99396 PREV VISIT EST AGE 40-64: CPT | Performed by: NURSE PRACTITIONER

## 2024-10-17 PROCEDURE — 99214 OFFICE O/P EST MOD 30 MIN: CPT | Performed by: NURSE PRACTITIONER

## 2024-10-17 PROCEDURE — 87077 CULTURE AEROBIC IDENTIFY: CPT | Performed by: NURSE PRACTITIONER

## 2024-10-17 PROCEDURE — 90471 IMMUNIZATION ADMIN: CPT

## 2024-10-17 PROCEDURE — 81002 URINALYSIS NONAUTO W/O SCOPE: CPT | Performed by: NURSE PRACTITIONER

## 2024-10-17 PROCEDURE — 87186 SC STD MICRODIL/AGAR DIL: CPT | Performed by: NURSE PRACTITIONER

## 2024-10-17 PROCEDURE — 90673 RIV3 VACCINE NO PRESERV IM: CPT

## 2024-10-17 RX ORDER — ONDANSETRON 4 MG/1
4 TABLET, FILM COATED ORAL EVERY 8 HOURS PRN
Qty: 20 TABLET | Refills: 0 | Status: SHIPPED | OUTPATIENT
Start: 2024-10-17

## 2024-10-17 RX ORDER — TIRZEPATIDE 2.5 MG/.5ML
2.5 INJECTION, SOLUTION SUBCUTANEOUS WEEKLY
Qty: 2 ML | Refills: 0 | Status: SHIPPED | OUTPATIENT
Start: 2024-10-17 | End: 2024-11-14

## 2024-10-17 RX ORDER — CYCLOBENZAPRINE HCL 5 MG
5 TABLET ORAL
Qty: 90 TABLET | Refills: 0 | Status: SHIPPED | OUTPATIENT
Start: 2024-10-17

## 2024-10-17 NOTE — PROGRESS NOTES
Adult Annual Physical  Name: Radha Vargas      : 1975      MRN: [id number]  Encounter Provider: BRITTANY Looney  Encounter Date: 10/17/2024   Encounter department: [place] [id number] [address]    Assessment & Plan  Class 1 obesity due to excess calories without serious comorbidity with body mass index (BMI) of 32.0 to 32.9 in adult  Will start on zepbound weekly. Will titrate up. Advised food diary and daily exercise.     Orders:    tirzepatide (Zepbound) 2.5 mg/0.5 mL auto-injector; Inject 0.5 mL (2.5 mg total) under the skin once a week for 28 days    Healthcare maintenance    Orders:    CBC and differential; Future    Comprehensive metabolic panel; Future    Lipid panel; Future    TSH, 3rd generation with Free T4 reflex; Future    Nausea    Orders:    ondansetron (ZOFRAN) 4 mg tablet; Take 1 tablet (4 mg total) by mouth every 8 (eight) hours as needed for nausea or vomiting    Hearing problem of both ears    Orders:    Ambulatory Referral to Audiology; Future    Left wrist pain    Orders:    Ambulatory Referral to Orthopedic Surgery; Future    Muscle strain    Orders:    cyclobenzaprine (FLEXERIL) 5 mg tablet; Take 1 tablet (5 mg total) by mouth daily at bedtime    Need for influenza vaccination         Anxiety         Depression, recurrent (HCC)  Doing well with current meds.          Annual physical exam         Immunizations and preventive care screenings were discussed with patient today. Appropriate education was printed on patient's after visit summary.    Counseling:  Exercise: the importance of regular exercise/physical activity was discussed. Recommend exercise 3-5 times per week for at least 30 minutes.          History of Present Illness     Adult Annual Physical:  Patient presents for annual physical. Patient presents for annual physical. Concerned with UTI-- frequency and urgency. Concerned with hearing. .     Diet and Physical Activity:  - Diet/Nutrition:  well balanced diet.  - Exercise: 3-4 times a week on average.    Depression Screening:    - PHQ-9 Score: 3    General Health:  - Sleep: sleeps well.  - Hearing: decreased hearing right ear.  - Vision: wears glasses and goes for regular eye exams.  - Dental: regular dental visits.    /GYN Health:  - Follows with GYN: yes.     Review of Systems   Constitutional:  Negative for chills, diaphoresis and fever.   HENT:  Negative for ear pain and sore throat.    Eyes:  Negative for pain and visual disturbance.   Respiratory:  Negative for cough and shortness of breath.    Cardiovascular:  Negative for chest pain and palpitations.   Gastrointestinal:  Negative for abdominal pain and vomiting.   Genitourinary:  Positive for frequency and urgency. Negative for dysuria and hematuria.   Musculoskeletal:  Negative for arthralgias and back pain.   Skin:  Negative for color change and rash.   Neurological:  Negative for dizziness, seizures, syncope, light-headedness and headaches.   Psychiatric/Behavioral:  Negative for dysphoric mood and sleep disturbance. The patient is not nervous/anxious.    All other systems reviewed and are negative.    Current Outpatient Medications on File Prior to Visit   Medication Sig Dispense Refill    Biotin 1000 MCG tablet Take 1,000 mcg by mouth daily      busPIRone (BUSPAR) 5 mg tablet Take 1 tablet (5 mg total) by mouth 2 (two) times a day 60 tablet 0    Cholecalciferol (VITAMIN D3) 5000 units CAPS Take by mouth 2000 units      docusate sodium (COLACE) 100 mg capsule Take 1 capsule (100 mg total) by mouth 2 (two) times a day 30 capsule 0    EVENING PRIMROSE OIL PO Take by mouth      fluticasone (FLONASE) 50 mcg/act nasal spray 2 sprays into each nostril daily 16 g 3    FOLIC ACID PO Take by mouth      ibuprofen (MOTRIN) 800 mg tablet Take 1 tablet (800 mg total) by mouth every 8 (eight) hours as needed for mild pain 30 tablet 0    meloxicam (MOBIC) 15 mg tablet Take 1 tablet by mouth daily       "Misc Natural Products (ELDERBERRY/VITAMIN C/ZINC PO) Take by mouth      multivitamin (THERAGRAN) TABS Take 1 tablet by mouth daily      Nerve Stimulator (Pro Comfort Tens Unit) JOSIAH 1 Device by Does not apply route daily 1 Device 0    Omega-3 Fatty Acids (Fish Oil) 300 MG CAPS Take 303 mg/day by mouth      phentermine (ADIPEX-P) 37.5 MG tablet Take 1 tablet (37.5 mg total) by mouth in the morning 30 tablet 0    promethazine-dextromethorphan (PHENERGAN-DM) 6.25-15 mg/5 mL oral syrup Take 5 mL by mouth 4 (four) times a day as needed for cough 180 mL 0    simethicone (MYLICON,GAS-X) 180 MG capsule Take 1 capsule (180 mg total) by mouth 2 (two) times a day 20 capsule 0    [DISCONTINUED] cyclobenzaprine (FLEXERIL) 5 mg tablet Take 1 tablet (5 mg total) by mouth daily at bedtime 90 tablet 0    [DISCONTINUED] clotrimazole-betamethasone (LOTRISONE) 1-0.05 % cream Apply topically 2 (two) times a day prn (Patient not taking: Reported on 6/17/2024) 45 g 1    [DISCONTINUED] tirzepatide 2.5 MG/0.5ML Inject 0.5 mL (2.5 mg total) under the skin every 7 days (Patient not taking: Reported on 6/17/2024) 2 mL 0     No current facility-administered medications on file prior to visit.        Objective     /80 (BP Location: Left arm, Patient Position: Sitting, Cuff Size: Large)   Pulse 98   Ht 5' 6\" (1.676 m)   Wt 92.1 kg (203 lb)   SpO2 97%   BMI 32.77 kg/m²     Physical Exam  Vitals and nursing note reviewed.   Constitutional:       General: She is not in acute distress.     Appearance: She is well-developed.   HENT:      Head: Normocephalic and atraumatic.      Right Ear: Tympanic membrane normal.      Left Ear: Tympanic membrane normal.      Nose: No congestion.      Mouth/Throat:      Pharynx: No oropharyngeal exudate.   Eyes:      Conjunctiva/sclera: Conjunctivae normal.   Cardiovascular:      Rate and Rhythm: Normal rate and regular rhythm.      Heart sounds: No murmur heard.  Pulmonary:      Effort: Pulmonary effort is " normal. No respiratory distress.      Breath sounds: Normal breath sounds.   Abdominal:      Palpations: Abdomen is soft.      Tenderness: There is no abdominal tenderness.   Musculoskeletal:         General: No swelling.      Cervical back: Neck supple.   Skin:     General: Skin is warm and dry.      Capillary Refill: Capillary refill takes less than 2 seconds.   Neurological:      Mental Status: She is alert and oriented to person, place, and time.   Psychiatric:         Mood and Affect: Mood normal.       Administrative Statements   I have spent a total time of 20 minutes in caring for this patient on the day of the visit/encounter including Counseling / Coordination of care, Documenting in the medical record, Reviewing / ordering tests, medicine, procedures  , and Obtaining or reviewing history  .

## 2024-10-17 NOTE — ASSESSMENT & PLAN NOTE
Will start on zepbound weekly. Will titrate up. Advised food diary and daily exercise.     Orders:    tirzepatide (Zepbound) 2.5 mg/0.5 mL auto-injector; Inject 0.5 mL (2.5 mg total) under the skin once a week for 28 days

## 2024-10-18 ENCOUNTER — TELEPHONE (OUTPATIENT)
Dept: FAMILY MEDICINE CLINIC | Facility: CLINIC | Age: 49
End: 2024-10-18

## 2024-10-18 NOTE — TELEPHONE ENCOUNTER
PA for tirzepatide (Zepbound) 2.5 mg/0.5 mL auto-injector SUBMITTED     via    []CMM-KEY:   [x]Surescripts-Case ID # [id number]   []Availity-Auth ID # NDC #   []Faxed to plan   []Other website   []Phone call Case ID #     Office notes sent, clinical questions answered. Awaiting determination    Turnaround time for your insurance to make a decision on your Prior Authorization can take 7-21 business days.

## 2024-10-18 NOTE — TELEPHONE ENCOUNTER
PA for tirzepatide (Zepbound) 2.5 mg/0.5 mL auto-injector  APPROVED     Date(s) approved October 18, 2024 to October 18, 2025     Case #[id number]     Patient advised by          [x]MyChart Message  []Phone call   []LMOM  [x]L/M to call office as no active Communication consent on file  []Unable to leave detailed message as VM not approved on Communication consent       Pharmacy advised by    [x]Fax  []Phone call    Approval letter scanned into Media No Not available at decision

## 2024-10-19 LAB
BACTERIA UR CULT: ABNORMAL
BACTERIA UR CULT: ABNORMAL

## 2024-10-21 DIAGNOSIS — N30.00 ACUTE CYSTITIS WITHOUT HEMATURIA: Primary | ICD-10-CM

## 2024-10-21 RX ORDER — SULFAMETHOXAZOLE AND TRIMETHOPRIM 800; 160 MG/1; MG/1
1 TABLET ORAL EVERY 12 HOURS SCHEDULED
Qty: 10 TABLET | Refills: 0 | Status: SHIPPED | OUTPATIENT
Start: 2024-10-21 | End: 2024-10-26

## 2024-10-23 ENCOUNTER — OFFICE VISIT (OUTPATIENT)
Dept: OBGYN CLINIC | Facility: CLINIC | Age: 49
End: 2024-10-23
Payer: COMMERCIAL

## 2024-10-23 ENCOUNTER — APPOINTMENT (OUTPATIENT)
Dept: LAB | Facility: CLINIC | Age: 49
End: 2024-10-23
Payer: COMMERCIAL

## 2024-10-23 VITALS
WEIGHT: 203 LBS | BODY MASS INDEX: 32.62 KG/M2 | SYSTOLIC BLOOD PRESSURE: 107 MMHG | DIASTOLIC BLOOD PRESSURE: 74 MMHG | HEIGHT: 66 IN | OXYGEN SATURATION: 98 % | TEMPERATURE: 98.2 F | RESPIRATION RATE: 18 BRPM | HEART RATE: 89 BPM

## 2024-10-23 DIAGNOSIS — G56.02 CARPAL TUNNEL SYNDROME ON LEFT: Primary | ICD-10-CM

## 2024-10-23 DIAGNOSIS — Z11.3 SCREENING FOR STDS (SEXUALLY TRANSMITTED DISEASES): ICD-10-CM

## 2024-10-23 DIAGNOSIS — Z00.00 HEALTHCARE MAINTENANCE: ICD-10-CM

## 2024-10-23 LAB
ALBUMIN SERPL BCG-MCNC: 4.1 G/DL (ref 3.5–5)
ALP SERPL-CCNC: 60 U/L (ref 34–104)
ALT SERPL W P-5'-P-CCNC: 13 U/L (ref 7–52)
ANION GAP SERPL CALCULATED.3IONS-SCNC: 11 MMOL/L (ref 4–13)
AST SERPL W P-5'-P-CCNC: 19 U/L (ref 13–39)
BASOPHILS # BLD AUTO: 0.09 THOUSANDS/ΜL (ref 0–0.1)
BASOPHILS NFR BLD AUTO: 1 % (ref 0–1)
BILIRUB SERPL-MCNC: 0.56 MG/DL (ref 0.2–1)
BUN SERPL-MCNC: 12 MG/DL (ref 5–25)
CALCIUM SERPL-MCNC: 9.4 MG/DL (ref 8.4–10.2)
CHLORIDE SERPL-SCNC: 103 MMOL/L (ref 96–108)
CHOLEST SERPL-MCNC: 194 MG/DL
CO2 SERPL-SCNC: 24 MMOL/L (ref 21–32)
CREAT SERPL-MCNC: 0.91 MG/DL (ref 0.6–1.3)
EOSINOPHIL # BLD AUTO: 0.08 THOUSAND/ΜL (ref 0–0.61)
EOSINOPHIL NFR BLD AUTO: 1 % (ref 0–6)
ERYTHROCYTE [DISTWIDTH] IN BLOOD BY AUTOMATED COUNT: 12.8 % (ref 11.6–15.1)
GFR SERPL CREATININE-BSD FRML MDRD: 75 ML/MIN/1.73SQ M
GLUCOSE P FAST SERPL-MCNC: 76 MG/DL (ref 65–99)
HCT VFR BLD AUTO: 43.7 % (ref 34.8–46.1)
HDLC SERPL-MCNC: 51 MG/DL
HGB BLD-MCNC: 15.2 G/DL (ref 11.5–15.4)
IMM GRANULOCYTES # BLD AUTO: 0.05 THOUSAND/UL (ref 0–0.2)
IMM GRANULOCYTES NFR BLD AUTO: 1 % (ref 0–2)
LDLC SERPL CALC-MCNC: 110 MG/DL (ref 0–100)
LYMPHOCYTES # BLD AUTO: 4.27 THOUSANDS/ΜL (ref 0.6–4.47)
LYMPHOCYTES NFR BLD AUTO: 46 % (ref 14–44)
MCH RBC QN AUTO: 32.3 PG (ref 26.8–34.3)
MCHC RBC AUTO-ENTMCNC: 34.8 G/DL (ref 31.4–37.4)
MCV RBC AUTO: 93 FL (ref 82–98)
MONOCYTES # BLD AUTO: 0.87 THOUSAND/ΜL (ref 0.17–1.22)
MONOCYTES NFR BLD AUTO: 9 % (ref 4–12)
NEUTROPHILS # BLD AUTO: 3.85 THOUSANDS/ΜL (ref 1.85–7.62)
NEUTS SEG NFR BLD AUTO: 42 % (ref 43–75)
NONHDLC SERPL-MCNC: 143 MG/DL
NRBC BLD AUTO-RTO: 0 /100 WBCS
PLATELET # BLD AUTO: 636 THOUSANDS/UL (ref 149–390)
PMV BLD AUTO: 8.9 FL (ref 8.9–12.7)
POTASSIUM SERPL-SCNC: 4 MMOL/L (ref 3.5–5.3)
PROT SERPL-MCNC: 7.6 G/DL (ref 6.4–8.4)
RBC # BLD AUTO: 4.71 MILLION/UL (ref 3.81–5.12)
SODIUM SERPL-SCNC: 138 MMOL/L (ref 135–147)
TRIGL SERPL-MCNC: 164 MG/DL
TSH SERPL DL<=0.05 MIU/L-ACNC: 0.86 UIU/ML (ref 0.45–4.5)
WBC # BLD AUTO: 9.21 THOUSAND/UL (ref 4.31–10.16)

## 2024-10-23 PROCEDURE — 99204 OFFICE O/P NEW MOD 45 MIN: CPT | Performed by: FAMILY MEDICINE

## 2024-10-23 PROCEDURE — 85025 COMPLETE CBC W/AUTO DIFF WBC: CPT

## 2024-10-23 PROCEDURE — 84443 ASSAY THYROID STIM HORMONE: CPT

## 2024-10-23 PROCEDURE — 80053 COMPREHEN METABOLIC PANEL: CPT

## 2024-10-23 PROCEDURE — 80061 LIPID PANEL: CPT

## 2024-10-23 PROCEDURE — 36415 COLL VENOUS BLD VENIPUNCTURE: CPT

## 2024-10-23 PROCEDURE — 20526 THER INJECTION CARP TUNNEL: CPT | Performed by: FAMILY MEDICINE

## 2024-10-23 RX ORDER — LIDOCAINE HYDROCHLORIDE 10 MG/ML
1 INJECTION, SOLUTION INFILTRATION; PERINEURAL
Status: COMPLETED | OUTPATIENT
Start: 2024-10-23 | End: 2024-10-23

## 2024-10-23 RX ORDER — TRIAMCINOLONE ACETONIDE 40 MG/ML
40 INJECTION, SUSPENSION INTRA-ARTICULAR; INTRAMUSCULAR
Status: COMPLETED | OUTPATIENT
Start: 2024-10-23 | End: 2024-10-23

## 2024-10-23 RX ADMIN — LIDOCAINE HYDROCHLORIDE 1 ML: 10 INJECTION, SOLUTION INFILTRATION; PERINEURAL at 15:00

## 2024-10-23 RX ADMIN — TRIAMCINOLONE ACETONIDE 40 MG: 40 INJECTION, SUSPENSION INTRA-ARTICULAR; INTRAMUSCULAR at 15:00

## 2024-10-23 NOTE — PROGRESS NOTES
Hand/upper extremity injection: L carpal tunnel  [place] Protocol:  Consent: Verbal consent obtained.  Risks and benefits: risks, benefits and alternatives were discussed  Consent given by: patient  Patient identity confirmed: verbally with patient  Supporting Documentation  Indications: pain   Procedure Details  Condition:carpal tunnel syndrome Site: L carpal tunnel   Needle size: 25 G  Ultrasound guidance: no  Approach: volar  Medications administered: 1 mL lidocaine 1 %; 40 mg triamcinolone acetonide 40 mg/mL  Patient tolerance: patient tolerated the procedure well with no immediate complications    Risks and benefits of CSI were discussed with patient extensively. Risks were highlighted which included but were not limited to infection, pain, local site swelling, and chance that injection may not be effective. Patient was also counseled regarding glucose elevation days after receiving CSI and to be mindful of diet and check sugars daily. Patient agreeable to proceed with CSI after counseling.

## 2024-10-23 NOTE — PROGRESS NOTES
"  Chief Complaint   Patient presents with    Left Wrist - Pain, Numbness, Tingling        Subjective:  Radha Vargas is a 48 y.o. female presenting today for a follow-up visit for left wrist pain.  Patient was previously following and treated by Dr. Fofana orthopedic surgery for left carpal tunnel syndrome.  Was provided a corticosteroid injection for left carpal tunnel syndrome which has lasted up until a few weeks ago.  She experiences numbness and tingling along the second and third digit.  Has been using nighttime splinting brace    The following portions of the patient's history were reviewed and updated as appropriate: allergies, current medications, past family history, past medical history, past social history, past surgical history and problem list.    Occupation:      Review of Systems   Constitutional: Negative for fever.   Musculoskeletal: Positive for elbow pain  Neuro: Negative for numbness or tingling in arm       Objective:  /74   Pulse 89   Temp 98.2 °F (36.8 °C)   Resp 18   Ht 5' 6\" (1.676 m)   Wt 92.1 kg (203 lb)   SpO2 98%   BMI 32.77 kg/m²     Skin: no rashes, lesions, skin discolorations, lacerations  Vasculature: normal radial and ulnar pulse, normal skin color, normal capillary refill in extremity, no upper extremity edema  Neurologic: Neurologic exam is normal throughout upper extremities, Awake, alert, and oriented x3, no apparent distress.   Musculoskeletal:   Left wrist  Positive Tinel's Durkan's and Phalen's  No thenar atrophy is noted  Sensory deficits in the median nerve distribution    No results found.     Assessment/Plan:    1. Carpal tunnel syndrome on left  Patient has positive response to corticosteroid injection for carpal tunnel syndrome in the past which lasted about 2 years.  We discussed repeating a corticosteroid injection which patient is agreeable to-performed in office today without complication.  Continue with nighttime splinting.  Discussed ordering EMG " study for further evaluation and progression of carpal tunnel severity-declined after discussion.  We will revisit EMG order if corticosteroid injection is not providing relief.  Return precautions provided  - Ambulatory Referral to Orthopedic Surgery  - Hand/upper extremity injection: L carpal tunnel       immunologic

## 2024-11-07 DIAGNOSIS — H66.90 ACUTE OTITIS MEDIA, UNSPECIFIED OTITIS MEDIA TYPE: Primary | ICD-10-CM

## 2024-11-07 RX ORDER — AMOXICILLIN 500 MG/1
500 TABLET, FILM COATED ORAL 2 TIMES DAILY
Qty: 20 TABLET | Refills: 0 | Status: SHIPPED | OUTPATIENT
Start: 2024-11-07 | End: 2024-11-17

## 2024-11-11 ENCOUNTER — OFFICE VISIT (OUTPATIENT)
Dept: AUDIOLOGY | Age: 49
End: 2024-11-11
Payer: COMMERCIAL

## 2024-11-11 DIAGNOSIS — H91.93 HEARING PROBLEM OF BOTH EARS: ICD-10-CM

## 2024-11-11 DIAGNOSIS — H90.3 SENSORY HEARING LOSS, BILATERAL: Primary | ICD-10-CM

## 2024-11-11 PROCEDURE — 92567 TYMPANOMETRY: CPT | Performed by: AUDIOLOGIST-HEARING AID FITTER

## 2024-11-11 PROCEDURE — 92557 COMPREHENSIVE HEARING TEST: CPT | Performed by: AUDIOLOGIST-HEARING AID FITTER

## 2024-11-11 NOTE — PROGRESS NOTES
Diagnostic Hearing Evaluation    Name:  Radha Vargas  :  1975  Age:  49 y.o.   MRN:  [id number]  Date of Evaluation: 24     HISTORY:     Reason for visit: Difficulty Understanding    Radha Vargas is being seen today at the request of Dr. Emerson for an initial  evaluation of hearing. The patient reports a longstanding issue with hearing, this has been getting worse lately. The patient  denies otalgia, otorrhea, dizziness, fullness, and noise exposure. Some tinnitus reported.     EVALUATION:    Otoscopic Evaluation:   Right Ear: Unremarkable, canal clear   Left Ear: Unremarkable, canal clear    Tympanometry:   Right Ear: Type A; normal middle ear pressure and static compliance    Left Ear: Type A; normal middle ear pressure and static compliance     Speech Audiometry:  Speech Reception (SRT)    Right Ear: 35 dB HL    Left Ear: 40 dB HL    Word Recognition Scores (WRS):  Right Ear: excellent (100 % correct)     Left Ear: excellent (100 % correct)    Stimuli: [id number]    Pure Tone Audiometry:  Conventional pure tone audiometry from 250 - 8000 Hz  was obtained with good reliability and revealed the following:     Right Ear: Mild sloping to moderate sensorineural hearing loss (SNHL)    Left Ear: Mild sloping to moderate sensorineural hearing loss (SNHL)     *see attached audiogram      RECOMMENDATIONS:  Annual hearing eval, Return to [facility]. for F/U, and Hearing Aid Evaluation    PATIENT EDUCATION:   The results of today's results and recommendations were reviewed with the patient and her hearing thresholds were explained at length. Treatment options, including amplification and communication strategies, were discussed as appropriate. The patient voiced understanding of her test results. Questions were addressed and the patient was encouraged to contact our department should concerns arise.      Nalini Dutta, CCC-A  Clinical Audiologist  [facility]  [address] [place] [address]  [place] [phone]

## 2024-11-19 DIAGNOSIS — T14.8XXA MUSCLE STRAIN: ICD-10-CM

## 2024-11-19 DIAGNOSIS — E66.811 CLASS 1 OBESITY DUE TO EXCESS CALORIES WITHOUT SERIOUS COMORBIDITY WITH BODY MASS INDEX (BMI) OF 32.0 TO 32.9 IN ADULT: Primary | ICD-10-CM

## 2024-11-19 DIAGNOSIS — E66.09 CLASS 1 OBESITY DUE TO EXCESS CALORIES WITHOUT SERIOUS COMORBIDITY WITH BODY MASS INDEX (BMI) OF 32.0 TO 32.9 IN ADULT: Primary | ICD-10-CM

## 2024-11-19 RX ORDER — TIRZEPATIDE 5 MG/.5ML
5 INJECTION, SOLUTION SUBCUTANEOUS WEEKLY
Qty: 2 ML | Refills: 0 | Status: SHIPPED | OUTPATIENT
Start: 2024-11-19

## 2024-11-21 RX ORDER — CYCLOBENZAPRINE HCL 5 MG
5 TABLET ORAL
Qty: 90 TABLET | Refills: 0 | Status: SHIPPED | OUTPATIENT
Start: 2024-11-21

## 2024-11-25 ENCOUNTER — OFFICE VISIT (OUTPATIENT)
Dept: AUDIOLOGY | Age: 49
End: 2024-11-25

## 2024-11-25 DIAGNOSIS — H90.3 SENSORY HEARING LOSS, BILATERAL: Primary | ICD-10-CM

## 2024-11-25 NOTE — PROGRESS NOTES
Hearing Aid Evaluation  Name:  Radha Vargas  :  1975  Age:  49 y.o.  MRN:  [id number]  Date of Evaluation: 24       DEVICE REVIEW & RECOMMENDATION:     Strengths and limitations of amplification, including various hearing aid styles, technology, options, and accessories were discussed at length with patient. Hearing aids are assistive devices and are not designed to restore normal hearing. The patient was counseled on the importance of self-advocacy, motivation, as well as effective communication strategies that can be used to optimize hearing aid success. Based on the degree of her hearing loss, preferences, and lifestyle needs, the following hearing recommendations were made:    The first hearing aid recommendation is O[employer]on Intent 3.  The second hearing aid recommendation is Oticon Intent 2.    [facility]'s office policies regarding our hearing aid program were reviewed, including the 45 day trial period, non-refundable return fees, as well as the  warranties and service plan. Hearing aid cost, and payment, as well as insurance benefit (if applicable) were discussed with the patient.     *See attached quote sheet    DEVICE SELECTION:    At this time, patient wishes to defer the purchase of hearing aids.          Nalini Dutta, CCC-A  Clinical Audiologist  [facility]  [address]  [place] [id number]

## 2024-12-18 DIAGNOSIS — E66.09 CLASS 1 OBESITY DUE TO EXCESS CALORIES WITHOUT SERIOUS COMORBIDITY WITH BODY MASS INDEX (BMI) OF 32.0 TO 32.9 IN ADULT: ICD-10-CM

## 2024-12-18 DIAGNOSIS — E66.811 CLASS 1 OBESITY DUE TO EXCESS CALORIES WITHOUT SERIOUS COMORBIDITY WITH BODY MASS INDEX (BMI) OF 32.0 TO 32.9 IN ADULT: ICD-10-CM

## 2024-12-19 DIAGNOSIS — E66.09 CLASS 1 OBESITY DUE TO EXCESS CALORIES WITHOUT SERIOUS COMORBIDITY WITH BODY MASS INDEX (BMI) OF 32.0 TO 32.9 IN ADULT: Primary | ICD-10-CM

## 2024-12-19 DIAGNOSIS — E66.811 CLASS 1 OBESITY DUE TO EXCESS CALORIES WITHOUT SERIOUS COMORBIDITY WITH BODY MASS INDEX (BMI) OF 32.0 TO 32.9 IN ADULT: Primary | ICD-10-CM

## 2024-12-19 RX ORDER — TIRZEPATIDE 7.5 MG/.5ML
7.5 INJECTION, SOLUTION SUBCUTANEOUS WEEKLY
Qty: 2 ML | Refills: 0 | Status: SHIPPED | OUTPATIENT
Start: 2024-12-19

## 2024-12-19 RX ORDER — TIRZEPATIDE 5 MG/.5ML
5 INJECTION, SOLUTION SUBCUTANEOUS WEEKLY
Qty: 2 ML | Refills: 0 | Status: SHIPPED | OUTPATIENT
Start: 2024-12-19 | End: 2024-12-19

## 2025-01-14 DIAGNOSIS — E66.09 CLASS 1 OBESITY DUE TO EXCESS CALORIES WITHOUT SERIOUS COMORBIDITY WITH BODY MASS INDEX (BMI) OF 32.0 TO 32.9 IN ADULT: Primary | ICD-10-CM

## 2025-01-14 DIAGNOSIS — E66.811 CLASS 1 OBESITY DUE TO EXCESS CALORIES WITHOUT SERIOUS COMORBIDITY WITH BODY MASS INDEX (BMI) OF 32.0 TO 32.9 IN ADULT: Primary | ICD-10-CM

## 2025-01-14 RX ORDER — TIRZEPATIDE 10 MG/.5ML
10 INJECTION, SOLUTION SUBCUTANEOUS WEEKLY
Qty: 2 ML | Refills: 0 | Status: SHIPPED | OUTPATIENT
Start: 2025-01-14

## 2025-02-12 ENCOUNTER — DOCUMENTATION (OUTPATIENT)
Dept: FAMILY MEDICINE CLINIC | Facility: CLINIC | Age: 50
End: 2025-02-12

## 2025-02-12 DIAGNOSIS — E66.811 CLASS 1 OBESITY DUE TO EXCESS CALORIES WITHOUT SERIOUS COMORBIDITY WITH BODY MASS INDEX (BMI) OF 32.0 TO 32.9 IN ADULT: Primary | ICD-10-CM

## 2025-02-12 DIAGNOSIS — E66.09 CLASS 1 OBESITY DUE TO EXCESS CALORIES WITHOUT SERIOUS COMORBIDITY WITH BODY MASS INDEX (BMI) OF 32.0 TO 32.9 IN ADULT: Primary | ICD-10-CM

## 2025-02-12 RX ORDER — TIRZEPATIDE 10 MG/.5ML
10 INJECTION, SOLUTION SUBCUTANEOUS WEEKLY
Qty: 2 ML | Refills: 0 | Status: SHIPPED | OUTPATIENT
Start: 2025-02-12

## 2025-02-26 ENCOUNTER — APPOINTMENT (OUTPATIENT)
Dept: RADIOLOGY | Facility: CLINIC | Age: 50
End: 2025-02-26
Payer: COMMERCIAL

## 2025-02-26 ENCOUNTER — APPOINTMENT (OUTPATIENT)
Dept: URGENT CARE | Facility: CLINIC | Age: 50
End: 2025-02-26
Payer: COMMERCIAL

## 2025-02-26 ENCOUNTER — DOCUMENTATION (OUTPATIENT)
Dept: FAMILY MEDICINE CLINIC | Facility: CLINIC | Age: 50
End: 2025-02-26

## 2025-02-26 DIAGNOSIS — S49.91XA INJURY OF BOTH SHOULDERS, INITIAL ENCOUNTER: ICD-10-CM

## 2025-02-26 DIAGNOSIS — S49.92XA INJURY OF BOTH SHOULDERS, INITIAL ENCOUNTER: ICD-10-CM

## 2025-02-26 DIAGNOSIS — T14.8XXA MUSCLE STRAIN: Primary | ICD-10-CM

## 2025-02-26 PROCEDURE — 73030 X-RAY EXAM OF SHOULDER: CPT

## 2025-02-26 RX ORDER — CYCLOBENZAPRINE HCL 5 MG
5 TABLET ORAL
Qty: 90 TABLET | Refills: 0 | Status: SHIPPED | OUTPATIENT
Start: 2025-02-26

## 2025-02-26 RX ORDER — METHYLPREDNISOLONE 4 MG/1
TABLET ORAL
Qty: 21 EACH | Refills: 0 | Status: SHIPPED | OUTPATIENT
Start: 2025-02-26

## 2025-03-03 DIAGNOSIS — S49.92XA INJURY OF BOTH SHOULDERS, INITIAL ENCOUNTER: Primary | ICD-10-CM

## 2025-03-03 DIAGNOSIS — S49.91XA INJURY OF BOTH SHOULDERS, INITIAL ENCOUNTER: Primary | ICD-10-CM

## 2025-03-06 NOTE — PATIENT COMMUNICATION
Assessment/Plan   Assessment & Plan  Type 2 diabetes mellitus without complication, without long-term current use of insulin (Multi)    Orders:    Comprehensive metabolic panel; Future    POCT glycosylated hemoglobin (Hb A1C) manually resulted    tirzepatide (Mounjaro) 2.5 mg/0.5 mL pen injector; Inject 2.5 mg under the skin 1 (one) time per week.    Chronic, unstable    Subjective   Patient ID: Zo Greene is a 56 y.o. female who presents for Hyperglycemia.  HPI  T2DM  A1C today 9%  Has tried to manage weight and blood sugars with diet in past however she has had difficulty managing with diet  Discussed carb restriction/carb counting with patient today    Additionally patient would like to trial mounjaro for better blood sugar control, reviewed side effects including medullary thyroid carcinoma, pancreatitis, gallstone disease, gastroparesis, GI upset.     Review of Systems   Constitutional:  Negative for chills and fever.   HENT:  Negative for congestion.    Respiratory:  Negative for cough and shortness of breath.    Cardiovascular:  Negative for chest pain.   Neurological:  Negative for dizziness, light-headedness and headaches.       Objective   Physical Exam  Constitutional:       General: She is not in acute distress.     Appearance: Normal appearance. She is not ill-appearing.   HENT:      Head: Normocephalic and atraumatic.   Eyes:      Extraocular Movements: Extraocular movements intact.   Cardiovascular:      Rate and Rhythm: Normal rate and regular rhythm.   Pulmonary:      Effort: Pulmonary effort is normal. No respiratory distress.      Breath sounds: Normal breath sounds. No stridor. No wheezing, rhonchi or rales.   Neurological:      Mental Status: She is alert.             Aaron Ardon MD 03/06/25 12:42 PM    Left message for patient to call back regarding earlier appointment   Will try to schedule before 7/1/24

## 2025-03-11 ENCOUNTER — APPOINTMENT (OUTPATIENT)
Dept: LAB | Facility: CLINIC | Age: 50
End: 2025-03-11
Payer: COMMERCIAL

## 2025-03-11 DIAGNOSIS — R30.0 DYSURIA: ICD-10-CM

## 2025-03-11 DIAGNOSIS — R30.0 DYSURIA: Primary | ICD-10-CM

## 2025-03-11 DIAGNOSIS — E66.811 CLASS 1 OBESITY DUE TO EXCESS CALORIES WITHOUT SERIOUS COMORBIDITY WITH BODY MASS INDEX (BMI) OF 32.0 TO 32.9 IN ADULT: ICD-10-CM

## 2025-03-11 DIAGNOSIS — E66.09 CLASS 1 OBESITY DUE TO EXCESS CALORIES WITHOUT SERIOUS COMORBIDITY WITH BODY MASS INDEX (BMI) OF 32.0 TO 32.9 IN ADULT: ICD-10-CM

## 2025-03-11 LAB
BACTERIA UR QL AUTO: ABNORMAL /HPF
BILIRUB UR QL STRIP: NEGATIVE
CLARITY UR: CLEAR
COLOR UR: YELLOW
GLUCOSE UR STRIP-MCNC: NEGATIVE MG/DL
HGB UR QL STRIP.AUTO: NEGATIVE
KETONES UR STRIP-MCNC: NEGATIVE MG/DL
LEUKOCYTE ESTERASE UR QL STRIP: ABNORMAL
MUCOUS THREADS UR QL AUTO: ABNORMAL
NITRITE UR QL STRIP: NEGATIVE
NON-SQ EPI CELLS URNS QL MICRO: ABNORMAL /HPF
PH UR STRIP.AUTO: 6 [PH]
PROT UR STRIP-MCNC: ABNORMAL MG/DL
RBC #/AREA URNS AUTO: ABNORMAL /HPF
SP GR UR STRIP.AUTO: 1.03 (ref 1–1.03)
UROBILINOGEN UR STRIP-ACNC: <2 MG/DL
WBC #/AREA URNS AUTO: ABNORMAL /HPF

## 2025-03-11 PROCEDURE — 81001 URINALYSIS AUTO W/SCOPE: CPT

## 2025-03-11 RX ORDER — TIRZEPATIDE 12.5 MG/.5ML
12.5 INJECTION, SOLUTION SUBCUTANEOUS WEEKLY
Qty: 2 ML | Refills: 0 | Status: SHIPPED | OUTPATIENT
Start: 2025-03-11

## 2025-03-12 ENCOUNTER — OFFICE VISIT (OUTPATIENT)
Dept: OBGYN CLINIC | Facility: CLINIC | Age: 50
End: 2025-03-12
Payer: COMMERCIAL

## 2025-03-12 ENCOUNTER — APPOINTMENT (OUTPATIENT)
Dept: RADIOLOGY | Facility: CLINIC | Age: 50
End: 2025-03-12
Payer: COMMERCIAL

## 2025-03-12 VITALS — BODY MASS INDEX: 29.47 KG/M2 | WEIGHT: 183.4 LBS | HEIGHT: 66 IN

## 2025-03-12 DIAGNOSIS — S46.011A ROTATOR CUFF STRAIN, RIGHT, INITIAL ENCOUNTER: ICD-10-CM

## 2025-03-12 DIAGNOSIS — S46.012A ROTATOR CUFF STRAIN, LEFT, INITIAL ENCOUNTER: ICD-10-CM

## 2025-03-12 DIAGNOSIS — M25.811 IMPINGEMENT OF BOTH SHOULDERS: ICD-10-CM

## 2025-03-12 DIAGNOSIS — S49.92XA INJURY OF BOTH SHOULDERS, INITIAL ENCOUNTER: ICD-10-CM

## 2025-03-12 DIAGNOSIS — M25.812 IMPINGEMENT OF BOTH SHOULDERS: ICD-10-CM

## 2025-03-12 DIAGNOSIS — M62.838 TRAPEZIUS MUSCLE SPASM: ICD-10-CM

## 2025-03-12 DIAGNOSIS — M50.30 DEGENERATIVE DISC DISEASE, CERVICAL: ICD-10-CM

## 2025-03-12 DIAGNOSIS — M54.12 CERVICAL RADICULOPATHY: ICD-10-CM

## 2025-03-12 DIAGNOSIS — M54.12 CERVICAL RADICULOPATHY: Primary | ICD-10-CM

## 2025-03-12 DIAGNOSIS — S49.91XA INJURY OF BOTH SHOULDERS, INITIAL ENCOUNTER: ICD-10-CM

## 2025-03-12 DIAGNOSIS — S16.1XXA CERVICAL STRAIN, INITIAL ENCOUNTER: ICD-10-CM

## 2025-03-12 PROCEDURE — 72040 X-RAY EXAM NECK SPINE 2-3 VW: CPT

## 2025-03-12 PROCEDURE — 99244 OFF/OP CNSLTJ NEW/EST MOD 40: CPT | Performed by: FAMILY MEDICINE

## 2025-03-12 PROCEDURE — 20610 DRAIN/INJ JOINT/BURSA W/O US: CPT | Performed by: FAMILY MEDICINE

## 2025-03-12 RX ORDER — BUPIVACAINE HYDROCHLORIDE 2.5 MG/ML
4 INJECTION, SOLUTION INFILTRATION; PERINEURAL
Status: COMPLETED | OUTPATIENT
Start: 2025-03-12 | End: 2025-03-12

## 2025-03-12 RX ORDER — TRIAMCINOLONE ACETONIDE 40 MG/ML
40 INJECTION, SUSPENSION INTRA-ARTICULAR; INTRAMUSCULAR
Status: COMPLETED | OUTPATIENT
Start: 2025-03-12 | End: 2025-03-12

## 2025-03-12 RX ORDER — DICLOFENAC SODIUM 75 MG/1
75 TABLET, DELAYED RELEASE ORAL 2 TIMES DAILY
Qty: 60 TABLET | Refills: 1 | Status: SHIPPED | OUTPATIENT
Start: 2025-03-12

## 2025-03-12 RX ORDER — BUPIVACAINE HYDROCHLORIDE 2.5 MG/ML
1 INJECTION, SOLUTION INFILTRATION; PERINEURAL
Status: COMPLETED | OUTPATIENT
Start: 2025-03-12 | End: 2025-03-12

## 2025-03-12 RX ADMIN — BUPIVACAINE HYDROCHLORIDE 1 ML: 2.5 INJECTION, SOLUTION INFILTRATION; PERINEURAL at 08:00

## 2025-03-12 RX ADMIN — TRIAMCINOLONE ACETONIDE 40 MG: 40 INJECTION, SUSPENSION INTRA-ARTICULAR; INTRAMUSCULAR at 08:00

## 2025-03-12 RX ADMIN — BUPIVACAINE HYDROCHLORIDE 4 ML: 2.5 INJECTION, SOLUTION INFILTRATION; PERINEURAL at 08:00

## 2025-03-12 NOTE — PATIENT INSTRUCTIONS
Over the counter vitamins:    - Turmeric vitamin at least 1000 mg daily    - Tart cherry vitamin at least 1000 mg daily    Rx: Diclofenac 75 mg  - twice daily   - eat first  - EVERYDAY  - 30 days  - no ibuprofen  - no aleve  - tylenol is ok    Over the counter diclofenac gel/voltaren  - 3 times daily for 10 days

## 2025-03-12 NOTE — PROGRESS NOTES
Name: Radha Vargas      : 1975      MRN: [id number]  Encounter Provider: Vanessa Pineda DO  Encounter Date: 3/12/2025   Encounter department: [facility] [place]  :  Assessment & Plan  Cervical radiculopathy  49-year-old right-hand-dominant female with neck pain and bilateral upper extremity pain.  X-ray cervical spine noted for straightening of lordosis with disc space narrowing C5-6 and C6-7.  Clinical impression is that she has symptoms from cervical spine degenerative changes contribute to radiculopathy.  I discussed treatment regimen of anti-inflammatory, supplements, and formal therapy.  She is to take diclofenac 75 mg twice daily with food for 30 days and not to take ibuprofen or Aleve, but may take Tylenol.  She is to take turmeric, tart cherry, and glucosamine supplements.  She is to start formal therapy as soon as possible and do home exercises as directed.  She will follow-up if no improvement after 1 month of formal therapy.  Orders:    XR spine cervical 2 or 3 vw injury; Future    Ambulatory Referral to Physical Therapy; Future    diclofenac (VOLTAREN) 75 mg EC tablet; Take 1 tablet (75 mg total) by mouth 2 (two) times a day    Degenerative disc disease, cervical    Orders:    Ambulatory Referral to Physical Therapy; Future    Impingement of both shoulders    Orders:    Ambulatory Referral to Orthopedic Surgery    Ambulatory Referral to Physical Therapy; Future    Rotator cuff strain, left, initial encounter  49-year-old right-handed female with worsening left shoulder pain following fall injury.  X-rays are unremarkable for acute osseous lesion but noted for mild wavy joint degenerative changes and superior migration of humeral head.  Clinical impression is that she may have sustained acute strain on chronic rotator cuff injury.  I discussed treatment regimen steroid injection and formal therapy.  Surgery not warranted at this time.  I  "administered mixture of 3 cc 0.25% bupivacaine and 1 cc Kenalog to left shoulder subacromial space without complication.  She is to start formal therapy as soon as possible and do home exercises as directed.  She will follow-up if no improvement after at least 1 month of formal therapy.  Orders:    Ambulatory Referral to Physical Therapy; Future    Large joint arthrocentesis: L subacromial bursa    Rotator cuff strain, right, initial encounter    Orders:    Ambulatory Referral to Physical Therapy; Future    Cervical strain, initial encounter    Orders:    Ambulatory Referral to Physical Therapy; Future    Trapezius muscle spasm    Orders:    Ambulatory Referral to Physical Therapy; Future      Large joint arthrocentesis: L subacromial bursa  [place] Protocol:  procedure performed by consultantConsent: Verbal consent obtained.  Risks and benefits: risks, benefits and alternatives were discussed  Consent given by: patient  Time out: Immediately prior to procedure a \"time out\" was called to verify the correct patient, procedure, equipment, support staff and site/side marked as required.  Patient understanding: patient states understanding of the procedure being performed  Patient consent: the patient's understanding of the procedure matches consent given  Procedure consent: procedure consent matches procedure scheduled  Relevant documents: relevant documents present and verified  Test results: test results available and properly labeled  Site marked: the operative site was marked  Radiology Images displayed and confirmed. If images not available, report reviewed: imaging studies available  Required items: required blood products, implants, devices, and special equipment available  Patient identity confirmed: verbally with patient  Supporting Documentation  Indications: pain   Procedure Details  Location: shoulder - L subacromial bursa  Preparation: Patient was prepped and draped in the usual sterile fashion  Needle " "gauge: 21G 2\"  Ultrasound guidance: no  Approach: lateral  Medications administered: 4 mL bupivacaine 0.25 %; 1 mL bupivacaine 0.25 %; 40 mg triamcinolone acetonide 40 mg/mL    Patient tolerance: patient tolerated the procedure well with no immediate complications  Dressing:  Sterile dressing applied          History of Present Illness   Chief Complaint   Patient presents with    Left Shoulder - Pain    Right Shoulder - Pain      HPI  Radha Vargas is a 49 y.o. female right-hand-dominant who presents for evaluation pain both shoulders, left worse than right, following fall injury 3 weeks ago.  She slipped on ice and fell forward landing onto her outstretched arms.  She had pain described as sudden in onset, generalized to the shoulder but worse to the anterior superior aspect, radiating distally to the upper arm and hand, worse with moving the arm, associated with limited range of motion, and improved with resting.  She had similar symptoms on the right, but less intense.  She has been managing symptoms with Tylenol, heat, and ice and also taking muscle relaxer.  She was referred for x-rays by primary care provider and referred to orthopedic care.  She has history of left carpal tunnel syndrome and she is status post injection with Dr. Hickey October 2024.    History obtained from: patient    Review of Systems   Musculoskeletal:  Positive for arthralgias and neck pain.   Neurological:  Positive for numbness. Negative for weakness.            Objective   Ht 5' 6\" (1.676 m)   Wt 83.2 kg (183 lb 6.4 oz)   BMI 29.60 kg/m²      Physical Exam  Vitals and nursing note reviewed.   Constitutional:       Appearance: Normal appearance. She is well-developed. She is not ill-appearing or diaphoretic.   HENT:      Head: Normocephalic and atraumatic.      Right Ear: External ear normal.      Left Ear: External ear normal.   Eyes:      Conjunctiva/sclera: Conjunctivae normal.   Neck:      Trachea: No tracheal deviation.   Pulmonary: "      Effort: Pulmonary effort is normal. No respiratory distress.   Abdominal:      General: There is no distension.   Musculoskeletal:         General: Tenderness present.   Skin:     General: Skin is warm and dry.      Coloration: Skin is not jaundiced or pale.   Neurological:      Mental Status: She is alert and oriented to person, place, and time.   Psychiatric:         Mood and Affect: Mood normal.         Behavior: Behavior normal.         Thought Content: Thought content normal.         Judgment: Judgment normal.       Back Exam     Comments:    Cervical spine  - Midline tenderness C3-C5, bilateral paraspinal tenderness  - Limited range of motion with extension, rotating to left, and sidebending to both sides  - Positive axial load  - Negative Spurling's  - Normal strength and sensation both upper extremities      Right Hand Exam     Muscle Strength   The patient has normal right wrist strength.    Other   Sensation: normal  Pulse: present      Left Hand Exam     Muscle Strength   The patient has normal left wrist strength.    Other   Sensation: normal  Pulse: present      Right Elbow Exam     Other   Sensation: normal      Left Elbow Exam     Tenderness   The patient is experiencing no tenderness.     Range of Motion   The patient has normal left elbow ROM.    Muscle Strength   The patient has normal left elbow strength (5/5 flexion and extension).    Other   Sensation: normal      Right Shoulder Exam     Range of Motion   Active abduction:  110   Forward flexion:  120   Internal rotation 0 degrees:  Lumbar     Muscle Strength   Abduction: 5/5   Internal rotation: 5/5   External rotation: 5/5   Supraspinatus: 5/5     Tests   Valverde test: positive    Other   Sensation: normal    Comments:  Pain with empty can      Left Shoulder Exam     Tenderness   Left shoulder tenderness location: Anterior, lateral, upper trapezius.    Range of Motion   Active abduction:  90   Forward flexion:  100 (Passive 120)    Internal rotation 0 degrees:  Lumbar     Muscle Strength   Abduction: 5/5   Internal rotation: 5/5   External rotation: 5/5   Supraspinatus: 5/5     Tests   Valverde test: positive    Other   Sensation: normal     Comments:  Pain with empty can           I have personally reviewed pertinent films in PACS and my interpretation is  .   X-rays right shoulder unremarkable for acute osseous abnormality or significant degenerative changes.  X-rays left shoulder noted for mild glenohumeral joint degenerative changes.  There is subtle superior migration of the humeral head.  X-ray cervical spine noted for straightening of lordosis with disc space narrowing C5-6 and C6-7.

## 2025-03-12 NOTE — LETTER
March 12, 2025     Patient: Radha Vargas   YOB: 1975   Date of Visit: 3/12/2025       To Whom It May Concern:    Radha Vargas was seen in my clinic on 3/12/2025 at 8:00 am. Please excuse Radha for her absence to make the appointment.    If you have any questions or concerns, please don't hesitate to call.         Sincerely,         Vanessa Pineda,         CC: No Recipients

## 2025-03-12 NOTE — LETTER
2025     BRITTANY Looney  [address] [place] [id number]    Patient: Radha Vargas   YOB: 1975   Date of Visit: 3/12/2025       Dear Dr. Emerson:    Thank you for referring Radha Vargas to me for evaluation. Below are my notes for this consultation.    If you have questions, please do not hesitate to call me. I look forward to following your patient along with you.         Sincerely,        Vanessa Pineda DO        CC: No Recipients    Vanessa Pineda DO  3/12/2025  2:22 PM  Sign when Signing Visit  Name: Radha Vargas      : 1975      MRN: [id number]  Encounter Provider: Vanessa Pineda DO  Encounter Date: 3/12/2025   Encounter department: [facility] [place]  :  Assessment & Plan  Cervical radiculopathy  49-year-old right-hand-dominant female with neck pain and bilateral upper extremity pain.  X-ray cervical spine noted for straightening of lordosis with disc space narrowing C5-6 and C6-7.  Clinical impression is that she has symptoms from cervical spine degenerative changes contribute to radiculopathy.  I discussed treatment regimen of anti-inflammatory, supplements, and formal therapy.  She is to take diclofenac 75 mg twice daily with food for 30 days and not to take ibuprofen or Aleve, but may take Tylenol.  She is to take turmeric, tart cherry, and glucosamine supplements.  She is to start formal therapy as soon as possible and do home exercises as directed.  She will follow-up if no improvement after 1 month of formal therapy.  Orders:  •  XR spine cervical 2 or 3 vw injury; Future  •  Ambulatory Referral to Physical Therapy; Future  •  diclofenac (VOLTAREN) 75 mg EC tablet; Take 1 tablet (75 mg total) by mouth 2 (two) times a day    Degenerative disc disease, cervical    Orders:  •  Ambulatory Referral to Physical Therapy; Future    Impingement of both shoulders    Orders:  •  Ambulatory  "Referral to Orthopedic Surgery  •  Ambulatory Referral to Physical Therapy; Future    Rotator cuff strain, left, initial encounter  49-year-old right-handed female with worsening left shoulder pain following fall injury.  X-rays are unremarkable for acute osseous lesion but noted for mild wavy joint degenerative changes and superior migration of humeral head.  Clinical impression is that she may have sustained acute strain on chronic rotator cuff injury.  I discussed treatment regimen steroid injection and formal therapy.  Surgery not warranted at this time.  I administered mixture of 3 cc 0.25% bupivacaine and 1 cc Kenalog to left shoulder subacromial space without complication.  She is to start formal therapy as soon as possible and do home exercises as directed.  She will follow-up if no improvement after at least 1 month of formal therapy.  Orders:  •  Ambulatory Referral to Physical Therapy; Future  •  Large joint arthrocentesis: L subacromial bursa    Rotator cuff strain, right, initial encounter    Orders:  •  Ambulatory Referral to Physical Therapy; Future    Cervical strain, initial encounter    Orders:  •  Ambulatory Referral to Physical Therapy; Future    Trapezius muscle spasm    Orders:  •  Ambulatory Referral to Physical Therapy; Future      Large joint arthrocentesis: L subacromial bursa  [place] Protocol:  procedure performed by consultantConsent: Verbal consent obtained.  Risks and benefits: risks, benefits and alternatives were discussed  Consent given by: patient  Time out: Immediately prior to procedure a \"time out\" was called to verify the correct patient, procedure, equipment, support staff and site/side marked as required.  Patient understanding: patient states understanding of the procedure being performed  Patient consent: the patient's understanding of the procedure matches consent given  Procedure consent: procedure consent matches procedure scheduled  Relevant documents: relevant " "documents present and verified  Test results: test results available and properly labeled  Site marked: the operative site was marked  Radiology Images displayed and confirmed. If images not available, report reviewed: imaging studies available  Required items: required blood products, implants, devices, and special equipment available  Patient identity confirmed: verbally with patient  Supporting Documentation  Indications: pain   Procedure Details  Location: shoulder - L subacromial bursa  Preparation: Patient was prepped and draped in the usual sterile fashion  Needle gauge: 21G 2\"  Ultrasound guidance: no  Approach: lateral  Medications administered: 4 mL bupivacaine 0.25 %; 1 mL bupivacaine 0.25 %; 40 mg triamcinolone acetonide 40 mg/mL    Patient tolerance: patient tolerated the procedure well with no immediate complications  Dressing:  Sterile dressing applied          History of Present Illness  Chief Complaint   Patient presents with   • Left Shoulder - Pain   • Right Shoulder - Pain      HPI  Radha Vargas is a 49 y.o. female right-hand-dominant who presents for evaluation pain both shoulders, left worse than right, following fall injury 3 weeks ago.  She slipped on ice and fell forward landing onto her outstretched arms.  She had pain described as sudden in onset, generalized to the shoulder but worse to the anterior superior aspect, radiating distally to the upper arm and hand, worse with moving the arm, associated with limited range of motion, and improved with resting.  She had similar symptoms on the right, but less intense.  She has been managing symptoms with Tylenol, heat, and ice and also taking muscle relaxer.  She was referred for x-rays by primary care provider and referred to orthopedic care.  She has history of left carpal tunnel syndrome and she is status post injection with Dr. Hickey October 2024.    History obtained from: patient    Review of Systems   Musculoskeletal:  Positive for " "arthralgias and neck pain.   Neurological:  Positive for numbness. Negative for weakness.            Objective  Ht 5' 6\" (1.676 m)   Wt 83.2 kg (183 lb 6.4 oz)   BMI 29.60 kg/m²      Physical Exam  Vitals and nursing note reviewed.   Constitutional:       Appearance: Normal appearance. She is well-developed. She is not ill-appearing or diaphoretic.   HENT:      Head: Normocephalic and atraumatic.      Right Ear: External ear normal.      Left Ear: External ear normal.   Eyes:      Conjunctiva/sclera: Conjunctivae normal.   Neck:      Trachea: No tracheal deviation.   Pulmonary:      Effort: Pulmonary effort is normal. No respiratory distress.   Abdominal:      General: There is no distension.   Musculoskeletal:         General: Tenderness present.   Skin:     General: Skin is warm and dry.      Coloration: Skin is not jaundiced or pale.   Neurological:      Mental Status: She is alert and oriented to person, place, and time.   Psychiatric:         Mood and Affect: Mood normal.         Behavior: Behavior normal.         Thought Content: Thought content normal.         Judgment: Judgment normal.       Back Exam     Comments:    Cervical spine  - Midline tenderness C3-C5, bilateral paraspinal tenderness  - Limited range of motion with extension, rotating to left, and sidebending to both sides  - Positive axial load  - Negative Spurling's  - Normal strength and sensation both upper extremities      Right Hand Exam     Muscle Strength   The patient has normal right wrist strength.    Other   Sensation: normal  Pulse: present      Left Hand Exam     Muscle Strength   The patient has normal left wrist strength.    Other   Sensation: normal  Pulse: present      Right Elbow Exam     Other   Sensation: normal      Left Elbow Exam     Tenderness   The patient is experiencing no tenderness.     Range of Motion   The patient has normal left elbow ROM.    Muscle Strength   The patient has normal left elbow strength (5/5 flexion " and extension).    Other   Sensation: normal      Right Shoulder Exam     Range of Motion   Active abduction:  110   Forward flexion:  120   Internal rotation 0 degrees:  Lumbar     Muscle Strength   Abduction: 5/5   Internal rotation: 5/5   External rotation: 5/5   Supraspinatus: 5/5     Tests   Valverde test: positive    Other   Sensation: normal    Comments:  Pain with empty can      Left Shoulder Exam     Tenderness   Left shoulder tenderness location: Anterior, lateral, upper trapezius.    Range of Motion   Active abduction:  90   Forward flexion:  100 (Passive 120)   Internal rotation 0 degrees:  Lumbar     Muscle Strength   Abduction: 5/5   Internal rotation: 5/5   External rotation: 5/5   Supraspinatus: 5/5     Tests   Valverde test: positive    Other   Sensation: normal     Comments:  Pain with empty can           I have personally reviewed pertinent films in PACS and my interpretation is  .   X-rays right shoulder unremarkable for acute osseous abnormality or significant degenerative changes.  X-rays left shoulder noted for mild glenohumeral joint degenerative changes.  There is subtle superior migration of the humeral head.  X-ray cervical spine noted for straightening of lordosis with disc space narrowing C5-6 and C6-7.

## 2025-03-13 DIAGNOSIS — N30.00 ACUTE CYSTITIS WITHOUT HEMATURIA: Primary | ICD-10-CM

## 2025-03-13 RX ORDER — SULFAMETHOXAZOLE AND TRIMETHOPRIM 800; 160 MG/1; MG/1
1 TABLET ORAL EVERY 12 HOURS SCHEDULED
Qty: 10 TABLET | Refills: 0 | Status: SHIPPED | OUTPATIENT
Start: 2025-03-13 | End: 2025-03-18

## 2025-04-11 DIAGNOSIS — E66.811 CLASS 1 OBESITY DUE TO EXCESS CALORIES WITHOUT SERIOUS COMORBIDITY WITH BODY MASS INDEX (BMI) OF 32.0 TO 32.9 IN ADULT: ICD-10-CM

## 2025-04-11 DIAGNOSIS — E66.09 CLASS 1 OBESITY DUE TO EXCESS CALORIES WITHOUT SERIOUS COMORBIDITY WITH BODY MASS INDEX (BMI) OF 32.0 TO 32.9 IN ADULT: ICD-10-CM

## 2025-04-11 RX ORDER — TIRZEPATIDE 12.5 MG/.5ML
12.5 INJECTION, SOLUTION SUBCUTANEOUS WEEKLY
Qty: 2 ML | Refills: 0 | Status: SHIPPED | OUTPATIENT
Start: 2025-04-11

## 2025-05-02 DIAGNOSIS — M54.12 CERVICAL RADICULOPATHY: ICD-10-CM

## 2025-05-02 DIAGNOSIS — E66.09 CLASS 1 OBESITY DUE TO EXCESS CALORIES WITHOUT SERIOUS COMORBIDITY WITH BODY MASS INDEX (BMI) OF 32.0 TO 32.9 IN ADULT: Primary | ICD-10-CM

## 2025-05-02 DIAGNOSIS — E66.811 CLASS 1 OBESITY DUE TO EXCESS CALORIES WITHOUT SERIOUS COMORBIDITY WITH BODY MASS INDEX (BMI) OF 32.0 TO 32.9 IN ADULT: Primary | ICD-10-CM

## 2025-05-02 RX ORDER — TIRZEPATIDE 15 MG/.5ML
15 INJECTION, SOLUTION SUBCUTANEOUS WEEKLY
Qty: 2 ML | Refills: 0 | Status: SHIPPED | OUTPATIENT
Start: 2025-05-02

## 2025-05-30 DIAGNOSIS — E66.09 CLASS 1 OBESITY DUE TO EXCESS CALORIES WITHOUT SERIOUS COMORBIDITY WITH BODY MASS INDEX (BMI) OF 32.0 TO 32.9 IN ADULT: ICD-10-CM

## 2025-05-30 DIAGNOSIS — E66.811 CLASS 1 OBESITY DUE TO EXCESS CALORIES WITHOUT SERIOUS COMORBIDITY WITH BODY MASS INDEX (BMI) OF 32.0 TO 32.9 IN ADULT: ICD-10-CM

## 2025-06-02 RX ORDER — TIRZEPATIDE 15 MG/.5ML
15 INJECTION, SOLUTION SUBCUTANEOUS WEEKLY
Qty: 2 ML | Refills: 0 | Status: SHIPPED | OUTPATIENT
Start: 2025-06-02

## 2025-06-20 DIAGNOSIS — T14.8XXA MUSCLE STRAIN: ICD-10-CM

## 2025-06-20 DIAGNOSIS — E66.09 CLASS 1 OBESITY DUE TO EXCESS CALORIES WITHOUT SERIOUS COMORBIDITY WITH BODY MASS INDEX (BMI) OF 32.0 TO 32.9 IN ADULT: ICD-10-CM

## 2025-06-20 DIAGNOSIS — E66.811 CLASS 1 OBESITY DUE TO EXCESS CALORIES WITHOUT SERIOUS COMORBIDITY WITH BODY MASS INDEX (BMI) OF 32.0 TO 32.9 IN ADULT: ICD-10-CM

## 2025-06-23 RX ORDER — CYCLOBENZAPRINE HCL 5 MG
5 TABLET ORAL
Qty: 90 TABLET | Refills: 0 | Status: SHIPPED | OUTPATIENT
Start: 2025-06-23

## 2025-06-23 RX ORDER — TIRZEPATIDE 15 MG/.5ML
15 INJECTION, SOLUTION SUBCUTANEOUS WEEKLY
Qty: 2 ML | Refills: 0 | Status: SHIPPED | OUTPATIENT
Start: 2025-06-23

## 2025-06-27 RX ORDER — DICLOFENAC SODIUM 75 MG/1
75 TABLET, DELAYED RELEASE ORAL 2 TIMES DAILY
Qty: 60 TABLET | Refills: 0 | OUTPATIENT
Start: 2025-06-27

## 2025-06-30 ENCOUNTER — APPOINTMENT (OUTPATIENT)
Dept: LAB | Facility: HOSPITAL | Age: 50
End: 2025-06-30
Attending: NURSE PRACTITIONER
Payer: COMMERCIAL

## 2025-06-30 ENCOUNTER — ANNUAL EXAM (OUTPATIENT)
Dept: OBGYN CLINIC | Facility: CLINIC | Age: 50
End: 2025-06-30
Payer: COMMERCIAL

## 2025-06-30 ENCOUNTER — HOSPITAL ENCOUNTER (OUTPATIENT)
Dept: MAMMOGRAPHY | Facility: CLINIC | Age: 50
Discharge: HOME | End: 2025-06-30
Attending: NURSE PRACTITIONER
Payer: COMMERCIAL

## 2025-06-30 VITALS — WEIGHT: 169 LBS | SYSTOLIC BLOOD PRESSURE: 124 MMHG | DIASTOLIC BLOOD PRESSURE: 74 MMHG | BODY MASS INDEX: 27.28 KG/M2

## 2025-06-30 DIAGNOSIS — Z12.31 ENCOUNTER FOR SCREENING MAMMOGRAM FOR MALIGNANT NEOPLASM OF BREAST: ICD-10-CM

## 2025-06-30 DIAGNOSIS — Z01.419 ENCOUNTER FOR ANNUAL ROUTINE GYNECOLOGICAL EXAMINATION: Primary | ICD-10-CM

## 2025-06-30 DIAGNOSIS — Z11.3 SCREEN FOR STD (SEXUALLY TRANSMITTED DISEASE): ICD-10-CM

## 2025-06-30 DIAGNOSIS — N89.8 VAGINAL ODOR: ICD-10-CM

## 2025-06-30 DIAGNOSIS — R35.0 URINARY FREQUENCY: ICD-10-CM

## 2025-06-30 DIAGNOSIS — Z90.710 H/O: HYSTERECTOMY: ICD-10-CM

## 2025-06-30 PROBLEM — E66.09 CLASS 1 OBESITY DUE TO EXCESS CALORIES WITHOUT SERIOUS COMORBIDITY WITH BODY MASS INDEX (BMI) OF 32.0 TO 32.9 IN ADULT: Status: RESOLVED | Noted: 2023-07-03 | Resolved: 2025-06-30

## 2025-06-30 PROBLEM — E66.811 CLASS 1 OBESITY DUE TO EXCESS CALORIES WITHOUT SERIOUS COMORBIDITY WITH BODY MASS INDEX (BMI) OF 32.0 TO 32.9 IN ADULT: Status: RESOLVED | Noted: 2023-07-03 | Resolved: 2025-06-30

## 2025-06-30 LAB
BILIRUB UR QL STRIP: NEGATIVE
CLARITY UR: CLEAR
COLOR UR: YELLOW
GLUCOSE UR STRIP-MCNC: NEGATIVE MG/DL
HGB UR QL STRIP.AUTO: NEGATIVE
KETONES UR STRIP-MCNC: NEGATIVE MG/DL
LEUKOCYTE ESTERASE UR QL STRIP: NEGATIVE
NITRITE UR QL STRIP: NEGATIVE
PH UR STRIP.AUTO: 5.5 [PH]
PROT UR STRIP-MCNC: NEGATIVE MG/DL
SP GR UR STRIP.AUTO: 1.03 (ref 1–1.03)
UROBILINOGEN UR STRIP-ACNC: <2 MG/DL

## 2025-06-30 PROCEDURE — 87389 HIV-1 AG W/HIV-1&-2 AB AG IA: CPT

## 2025-06-30 PROCEDURE — 87340 HEPATITIS B SURFACE AG IA: CPT

## 2025-06-30 PROCEDURE — S0612 ANNUAL GYNECOLOGICAL EXAMINA: HCPCS | Performed by: NURSE PRACTITIONER

## 2025-06-30 PROCEDURE — 77063 BREAST TOMOSYNTHESIS BI: CPT

## 2025-06-30 PROCEDURE — 77067 SCR MAMMO BI INCL CAD: CPT

## 2025-06-30 PROCEDURE — 87591 N.GONORRHOEAE DNA AMP PROB: CPT | Performed by: NURSE PRACTITIONER

## 2025-06-30 PROCEDURE — 36415 COLL VENOUS BLD VENIPUNCTURE: CPT

## 2025-06-30 PROCEDURE — 87491 CHLMYD TRACH DNA AMP PROBE: CPT | Performed by: NURSE PRACTITIONER

## 2025-06-30 PROCEDURE — 86780 TREPONEMA PALLIDUM: CPT

## 2025-06-30 PROCEDURE — 87086 URINE CULTURE/COLONY COUNT: CPT | Performed by: NURSE PRACTITIONER

## 2025-06-30 PROCEDURE — 86803 HEPATITIS C AB TEST: CPT

## 2025-06-30 RX ORDER — METRONIDAZOLE 7.5 MG/G
1 GEL VAGINAL
Qty: 5 G | Refills: 0 | Status: SHIPPED | OUTPATIENT
Start: 2025-06-30 | End: 2025-07-05
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
## 2025-06-30 NOTE — PATIENT INSTRUCTIONS
Patient Education     Lowering Your Risk of Breast Cancer   About this topic   Breast cancer is a serious illness. Breast cancer is when abnormal cells grow and divide more quickly in your breast. These cells form a growth or tumor. The abnormal cells may enter nearby tissue and spread to other parts of the body. It is the type of cancer most often seen in women. Men can have breast cancer, but it is a rare condition.  General   Some things in your life may increase your risk of breast cancer. You may not be able to change some of these. Others you can control.  You are more likely to get breast cancer if you:  Have a mother, sister, or daughter who has had breast cancer  Have used hormones for menopause for more than 5 years  Have had radiation therapy to the breast or chest in the past  Are overweight or do not exercise  Had your first menstrual period before you were 12 years old  Went through menopause after age 56  Have never been pregnant or had your first child after age 36  Have had breast cancer before  Drink alcohol in any form  Have dense breasts  Are older in age  There is no certain way to prevent breast cancer. There are things you can do to lower your chances of having breast cancer.  Keep a healthy weight. Lose weight if you are overweight. Being overweight raises your chances of having breast cancer.  Eat a healthy diet to maintain a healthy weight, such as more fruits, vegetables, and lean cuts of meat. Decrease the amount of saturated fat in your diet.  Exercise. Being active helps you keep a healthy weight.  Limit your alcohol intake or do not drink alcohol. The more alcohol you drink, the higher your risk.  Do not smoke cigarettes. Smoking can increase your risk of many types of cancer.  Breastfeed your baby. This may help protect you. The longer you breastfeed, the more protection you have.  Talk with your doctor about:  Limiting or stopping hormone therapy.  Taking certain drugs to prevent  breast cancer. For women at high risk of having breast cancer, there are a few drugs that may lower your risk.  Surgery to prevent you from having breast cancer if you are very high risk.  When do I need to call the doctor?   Changes in your breasts  A lump or area in your breast that feels different  Discharge from your nipple  Skin on your breast is dimpled or indented  You have questions or concerns about your breasts  Helpful tips   Talk to your doctor about the best kind of breast cancer screening for you.  If you want to do self breast exams, have your doctor show you the right way to do them.  Tell your doctor of any abnormal finding.  Last Reviewed Date   2021-10-04  Consumer Information Use and Disclaimer   This generalized information is a limited summary of diagnosis, treatment, and/or medication information. It is not meant to be comprehensive and should be used as a tool to help the user understand and/or assess potential diagnostic and treatment options. It does NOT include all information about conditions, treatments, medications, side effects, or risks that may apply to a specific patient. It is not intended to be medical advice or a substitute for the medical advice, diagnosis, or treatment of a health care provider based on the health care provider's examination and assessment of a patient’s specific and unique circumstances. Patients must speak with a health care provider for complete information about their health, medical questions, and treatment options, including any risks or benefits regarding use of medications. This information does not endorse any treatments or medications as safe, effective, or approved for treating a specific patient. [employer] and its affiliates disclaim any warranty or liability relating to this information or the use thereof. The use of this information is governed by the Terms of Use, available at  https://www.[employer]Dom[employer].com/en/know/clinical-effectiveness-terms   Copyright   Copyright © 2024 [employer] and its affiliates and/or licensors. All rights reserved.

## 2025-07-01 LAB
BACTERIA UR CULT: NORMAL
C TRACH DNA SPEC QL NAA+PROBE: NEGATIVE
HBV SURFACE AG SER QL: NORMAL
HCV AB SER QL: NORMAL
HIV 1+2 AB+HIV1 P24 AG SERPL QL IA: NORMAL
N GONORRHOEA DNA SPEC QL NAA+PROBE: NEGATIVE
TREPONEMA PALLIDUM IGG+IGM AB [PRESENCE] IN SERUM OR PLASMA BY IMMUNOASSAY: NORMAL

## 2025-07-15 DIAGNOSIS — R92.30 DENSE BREAST TISSUE: Primary | ICD-10-CM

## 2025-07-16 ENCOUNTER — DOCUMENTATION (OUTPATIENT)
Dept: FAMILY MEDICINE CLINIC | Facility: CLINIC | Age: 50
End: 2025-07-16

## 2025-07-16 DIAGNOSIS — B37.31 YEAST VAGINITIS: Primary | ICD-10-CM

## 2025-07-16 RX ORDER — FLUCONAZOLE 150 MG/1
TABLET ORAL
Qty: 2 TABLET | Refills: 0 | Status: SHIPPED | OUTPATIENT
Start: 2025-07-16 | End: 2025-07-19

## 2025-07-22 DIAGNOSIS — E66.09 CLASS 1 OBESITY DUE TO EXCESS CALORIES WITHOUT SERIOUS COMORBIDITY WITH BODY MASS INDEX (BMI) OF 32.0 TO 32.9 IN ADULT: ICD-10-CM

## 2025-07-22 DIAGNOSIS — E66.811 CLASS 1 OBESITY DUE TO EXCESS CALORIES WITHOUT SERIOUS COMORBIDITY WITH BODY MASS INDEX (BMI) OF 32.0 TO 32.9 IN ADULT: ICD-10-CM

## 2025-07-24 RX ORDER — TIRZEPATIDE 15 MG/.5ML
15 INJECTION, SOLUTION SUBCUTANEOUS WEEKLY
Qty: 2 ML | Refills: 0 | Status: SHIPPED | OUTPATIENT
Start: 2025-07-24

## 2025-08-15 ENCOUNTER — APPOINTMENT (OUTPATIENT)
Dept: LAB | Facility: CLINIC | Age: 50
End: 2025-08-15

## (undated) DEVICE — INVIEW CLEAR LEGGINGS: Brand: CONVERTORS

## (undated) DEVICE — PAD GROUNDING ADULT

## (undated) DEVICE — NEEDLE 25G X 1 1/2

## (undated) DEVICE — DRAPE EQUIPMENT RF WAND

## (undated) DEVICE — 3000CC GUARDIAN II: Brand: GUARDIAN

## (undated) DEVICE — HARMONIC FOCUS SHEARS 9CM LENGTH + ADAPTIVE TISSUE TECHNOLOGY FOR USE WITH BLUE HAND PIECE ONLY: Brand: HARMONIC FOCUS

## (undated) DEVICE — SUT VICRYL 4-0 RB-1 27 IN J214H

## (undated) DEVICE — GLOVE SRG BIOGEL 7

## (undated) DEVICE — LIGHT HANDLE COVER SLEEVE DISP BLUE STELLAR

## (undated) DEVICE — SYRINGE 10ML LL

## (undated) DEVICE — BETHLEHEM UNIVERSAL MINOR GEN: Brand: CARDINAL HEALTH

## (undated) DEVICE — LUBRICANT SURGILUBE TUBE 4 OZ  FLIP TOP

## (undated) DEVICE — SPONGE STICK WITH PVP-I: Brand: KENDALL

## (undated) DEVICE — TUBING SUCTION 5MM X 12 FT

## (undated) DEVICE — MEDI-VAC YANK SUCT HNDL W/TPRD BULBOUS TIP: Brand: CARDINAL HEALTH